# Patient Record
Sex: FEMALE | Race: WHITE
[De-identification: names, ages, dates, MRNs, and addresses within clinical notes are randomized per-mention and may not be internally consistent; named-entity substitution may affect disease eponyms.]

---

## 2017-09-04 ENCOUNTER — HOSPITAL ENCOUNTER (EMERGENCY)
Dept: HOSPITAL 8 - ED | Age: 22
Discharge: HOME | End: 2017-09-04
Payer: COMMERCIAL

## 2017-09-04 VITALS — SYSTOLIC BLOOD PRESSURE: 122 MMHG | DIASTOLIC BLOOD PRESSURE: 74 MMHG

## 2017-09-04 VITALS — HEIGHT: 63 IN | BODY MASS INDEX: 30.55 KG/M2 | WEIGHT: 172.4 LBS
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
## 2017-09-04 DIAGNOSIS — S43.102A: ICD-10-CM

## 2017-09-04 DIAGNOSIS — S43.52XA: Primary | ICD-10-CM
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
## 2017-09-04 PROCEDURE — 99284 EMERGENCY DEPT VISIT MOD MDM: CPT

## 2018-01-05 ENCOUNTER — HOSPITAL ENCOUNTER (EMERGENCY)
Dept: HOSPITAL 8 - ED | Age: 23
Discharge: HOME | End: 2018-01-05
Payer: COMMERCIAL

## 2018-01-05 VITALS — HEIGHT: 63 IN | WEIGHT: 179.17 LBS | BODY MASS INDEX: 31.75 KG/M2

## 2018-01-05 VITALS — DIASTOLIC BLOOD PRESSURE: 73 MMHG | SYSTOLIC BLOOD PRESSURE: 126 MMHG

## 2018-01-05 DIAGNOSIS — J02.9: Primary | ICD-10-CM

## 2018-01-05 DIAGNOSIS — R07.89: ICD-10-CM

## 2018-01-05 DIAGNOSIS — R05: ICD-10-CM

## 2018-01-05 PROCEDURE — 87400 INFLUENZA A/B EACH AG IA: CPT

## 2018-01-05 PROCEDURE — 93005 ELECTROCARDIOGRAM TRACING: CPT

## 2018-01-05 PROCEDURE — 71046 X-RAY EXAM CHEST 2 VIEWS: CPT

## 2018-01-05 PROCEDURE — 99285 EMERGENCY DEPT VISIT HI MDM: CPT

## 2018-03-08 ENCOUNTER — HOSPITAL ENCOUNTER (EMERGENCY)
Facility: MEDICAL CENTER | Age: 23
End: 2018-03-08
Attending: EMERGENCY MEDICINE
Payer: COMMERCIAL

## 2018-03-08 VITALS
HEART RATE: 74 BPM | HEIGHT: 63 IN | SYSTOLIC BLOOD PRESSURE: 112 MMHG | BODY MASS INDEX: 31.21 KG/M2 | DIASTOLIC BLOOD PRESSURE: 74 MMHG | TEMPERATURE: 98.1 F | RESPIRATION RATE: 16 BRPM | OXYGEN SATURATION: 98 % | WEIGHT: 176.15 LBS

## 2018-03-08 DIAGNOSIS — K12.1 STOMATITIS: ICD-10-CM

## 2018-03-08 LAB
S PYO AG THROAT QL: NORMAL
SIGNIFICANT IND 70042: NORMAL
SITE SITE: NORMAL
SOURCE SOURCE: NORMAL

## 2018-03-08 PROCEDURE — 87081 CULTURE SCREEN ONLY: CPT

## 2018-03-08 PROCEDURE — 700101 HCHG RX REV CODE 250: Performed by: EMERGENCY MEDICINE

## 2018-03-08 PROCEDURE — 99284 EMERGENCY DEPT VISIT MOD MDM: CPT

## 2018-03-08 PROCEDURE — 87880 STREP A ASSAY W/OPTIC: CPT

## 2018-03-08 RX ADMIN — LIDOCAINE HYDROCHLORIDE 15 ML: 20 SOLUTION ORAL; TOPICAL at 11:17

## 2018-03-08 ASSESSMENT — PAIN SCALES - GENERAL: PAINLEVEL_OUTOF10: 5

## 2018-03-08 ASSESSMENT — LIFESTYLE VARIABLES: DO YOU DRINK ALCOHOL: NO

## 2018-03-08 NOTE — DISCHARGE INSTRUCTIONS
Canker Sores  Introduction  Canker sores are small, painful sores that develop inside your mouth. They may also be called aphthous ulcers. You can get canker sores on the inside of your lips or cheeks, on your tongue, or anywhere inside your mouth. You can have just one canker sore or several of them. Canker sores cannot be passed from one person to another (noncontagious). These sores are different than the sores that you may get on the outside of your lips (cold sores or fever blisters).  Canker sores usually start as painful red bumps. Then they turn into small white, yellow, or gray ulcers that have red borders. The ulcers may be quite painful. The pain may be worse when you eat or drink.  What are the causes?  The cause of this condition is not known.  What increases the risk?  This condition is more likely to develop in:  · Women.  · People in their teens or 20s.  · Women who are having their menstrual period.  · People who are under a lot of emotional stress.  · People who do not get enough iron or B vitamins.  · People who have poor oral hygiene.  · People who have an injury inside the mouth. This can happen after having dental work or from chewing something hard.  What are the signs or symptoms?  Along with the canker sore, symptoms may also include:  · Fever.  · Fatigue.  · Swollen lymph nodes in your neck.  How is this diagnosed?  This condition can be diagnosed based on your symptoms. Your health care provider will also examine your mouth. Your health care provider may also do tests if you get canker sores often or if they are very bad. Tests may include:  · Blood tests to rule out other causes of canker sores.  · Taking swabs from the sore to check for infection.  · Taking a small piece of skin from the sore (biopsy) to test it for cancer.  How is this treated?  Most canker sores clear up without treatment in about 10 days. Home care is usually the only treatment that you will need. Over-the-counter  medicines can relieve discomfort. If you have severe canker sores, your health care provider may prescribe:  · Numbing ointment to relieve pain.  · Vitamins.  · Steroid medicines. These may be given as:  ¨ Oral pills.  ¨ Mouth rinses.  ¨ Gels.  · Antibiotic mouth rinse.  Follow these instructions at home:  · Apply, take, or use medicines only as directed by your health care provider. These include vitamins.  · If you were prescribed an antibiotic mouth rinse, finish all of it even if you start to feel better.  · Until the sores are healed:  ¨ Do not drink coffee or citrus juices.  ¨ Do not eat spicy or salty foods.  · Use a mild, over-the-counter mouth rinse as directed by your health care provider.  · Practice good oral hygiene.  ¨ Floss your teeth every day.  ¨ Brush your teeth with a soft brush twice each day.  Contact a health care provider if:  · Your symptoms do not get better after two weeks.  · You also have a fever or swollen glands.  · You get canker sores often.  · You have a canker sore that is getting larger.  · You cannot eat or drink due to your canker sores.  This information is not intended to replace advice given to you by your health care provider. Make sure you discuss any questions you have with your health care provider.  Document Released: 04/13/2012 Document Revised: 05/25/2017 Document Reviewed: 11/18/2015  © 2017 [employer]

## 2018-03-08 NOTE — ED PROVIDER NOTES
"ED Provider Note    Scribed for Rajani Thornton M.D. by Damian Manning. 3/8/2018  11:05 AM    Primary care provider: Pcp Pt States None  Means of arrival: Walk-in  History obtained from: Patient  History limited by: None    CHIEF COMPLAINT  Chief Complaint   Patient presents with   • Oral Swelling     pt reports blisters in mouth x 4 days. worse today.       HPI  Theodora Ponce is a 22 y.o. female who presents to the Emergency Department for oral swelling onset four days ago. Patient was seen at [facility] yesterday where she was diagnosed with a viral infection and prescribed Magic Mouthwash. This morning, she noticed her symptoms had greatly worsened. Patient is now unable to eat or speak properly, has a fever of 100 °F, and is experiencing a sore throat.   Patient denies any skin rash.    REVIEW OF SYSTEMS  HEENT:  sore throat, oral swelling    Endocrine: fevers  SKIN: no skin rash    See history of present illness. E.    PAST MEDICAL HISTORY   has a past medical history of Tonsillitis.    SURGICAL HISTORY   has a past surgical history that includes tonsillectomy (2/24/2010).    SOCIAL HISTORY  Social History   Substance Use Topics   • Smoking status: Never Smoker   • Alcohol use No      Comment: weekends      History   Drug Use No       FAMILY HISTORY  No pertinent family history.    CURRENT MEDICATIONS    Current Outpatient Prescriptions on File Prior to Encounter   Medication Sig Dispense Refill   • ondansetron (ZOFRAN ODT) 4 MG TABLET DISPERSIBLE Take 1 Tab by mouth every 8 hours as needed for Nausea/Vomiting. 10 Tab 0   • AMOXICILLIN by Does not apply route.     • Phenol-Phenolate Sodium (THROAT SPRAY MT) Spray  in mouth/throat.     • TYLENOL 8 HOUR PO Take  by mouth.        ALLERGIES  Allergies   Allergen Reactions   • Vicodin [Hydrocodone-Acetaminophen] Hives       PHYSICAL EXAM  VITAL SIGNS: /77   Pulse 80   Temp 36.7 °C (98 °F)   Resp 18   Ht 1.6 m (5' 3\")   Wt 79.9 kg (176 lb 2.4 oz)   " SpO2 93%   BMI 31.20 kg/m²     Constitutional: Well developed, Well nourished, No acute distress, Non-toxic appearance.   HEENT: Normocephalic, Atraumatic,  external ears normal, pharynx pink,  Mucous  Membranes moist, No rhinorrhea or mucosal edema. Cluster of ulcerations on right side of tongue. No submandibular or sublingual swelling. No drooling. No trismus. Posterior oropharynx is mildly erythematous. No exudates  Eyes: PERRL, EOMI, Conjunctiva normal, No discharge.   Neck: Normal range of motion, No tenderness, Supple, No stridor.   Lymphatic: No lymphadenopathy    Cardiovascular: Regular Rate and Rhythm, No murmurs,  rubs, or gallops.   Thorax & Lungs: Lungs clear to auscultation bilaterally, No respiratory distress, No wheezes, rhales or rhonchi, No chest wall tenderness.   Abdomen: Bowel sounds normal, Soft, non tender, non distended,  No pulsatile masses., no rebound guarding or peritoneal signs.   Skin: Warm, Dry, No erythema, No rash,   Extremities: Equal, intact distal pulses, No cyanosis, No tenderness.   Musculoskeletal: Good range of motion in all major joints. No tenderness to palpation or major deformities noted.   Neurologic: Alert & awake, no focal deficits  Psychiatric: Affect normal,       DIAGNOSTIC STUDIES / PROCEDURES    LABS  Results for orders placed or performed during the hospital encounter of 03/08/18   RAPID STREP, CULT IF INDICATED (CULTURE IF NEGATIVE)   Result Value Ref Range    Significant Indicator NEG     Source THRT     Site THROAT     Rapid Strep Screen       Negative for Group A streptococcus.  A negative result may be obtained if the specimen is  inadequate or antigen concentration is below the  sensitivity of the test. This negative test will be followed  up with a culture as requested.        All labs reviewed by me.    COURSE & MEDICAL DECISION MAKING  Nursing notes, VS, PMSFHx reviewed in chart.    11:05 AM - Patient seen and examined at bedside. Informed patient that  labs would look for strep although it is unlikely. Patient will be treated with topical Xylocaine. Ordered rapid strep to evaluate her symptoms. The differential diagnoses include but are not limited to: aphthous stomatitis viral vs. strep, pharyngitis.    11:47 AM Patient reports feeling improved at this time. Informed her of her lab results which were negative for strep throat. She is stable for discharge at this time with a prescription for topical lidocaine to treat her sores. Discussed return precautions and follow up if symptoms do not improve.     The patient will return for new or worsening symptoms and is stable at the time of discharge.    Patient has had high blood pressure while in the emergency department, felt likely secondary to medical condition. Counseled patient to monitor blood pressure at home and follow up with primary care physician.     DISPOSITION:  Patient will be discharged home in stable condition.    FOLLOW UP:  [address] [address]  [phone]  Call in 1 day  for recheck, to establish care      OUTPATIENT MEDICATIONS:  Discharge Medication List as of 3/8/2018 11:55 AM      START taking these medications    Details   lidocaine viscous 2% (XYLOCAINE) 2 % Solution Take 15 mL by mouth as needed for Throat/Mouth Pain., Disp-120 mL, R-1, Print Rx Paper              FINAL IMPRESSION  1. Stomatitis          Damian RAM (Scribe), am scribing for, and in the presence of, Rajani Thornton M.D..    Electronically signed by: Damian Manning (Moisés), 3/8/2018    Rajani RAM M.D. personally performed the services described in this documentation, as scribed by Damian Manning in my presence, and it is both accurate and complete.    The note accurately reflects work and decisions made by me.  Rajani Thornton  3/8/2018  3:41 PM

## 2018-03-08 NOTE — ED TRIAGE NOTES
"Chief Complaint   Patient presents with   • Oral Swelling     pt reports blisters in mouth x 4 days. worse today.     Was seen at [facility] for same a few days ago.  Blood pressure 123/77, pulse 81, temperature 36.7 °C (98 °F), resp. rate 18, height 1.6 m (5' 3\"), weight 79.9 kg (176 lb 2.4 oz), SpO2 97 %.    Pt informed of wait times. Educated on triage process.  Asked to return to triage RN for any new or worsening of symptoms. Thanked for patience.        "

## 2018-03-10 LAB
S PYO SPEC QL CULT: NORMAL
SIGNIFICANT IND 70042: NORMAL
SITE SITE: NORMAL
SOURCE SOURCE: NORMAL

## 2018-05-18 ENCOUNTER — HOSPITAL ENCOUNTER (INPATIENT)
Dept: HOSPITAL 8 - ED | Age: 23
LOS: 3 days | Discharge: HOME | DRG: 417 | End: 2018-05-21
Attending: INTERNAL MEDICINE | Admitting: HOSPITALIST
Payer: COMMERCIAL

## 2018-05-18 VITALS — HEIGHT: 63 IN | WEIGHT: 188.27 LBS | BODY MASS INDEX: 33.36 KG/M2

## 2018-05-18 VITALS — DIASTOLIC BLOOD PRESSURE: 68 MMHG | SYSTOLIC BLOOD PRESSURE: 99 MMHG

## 2018-05-18 DIAGNOSIS — E87.1: ICD-10-CM

## 2018-05-18 DIAGNOSIS — J90: ICD-10-CM

## 2018-05-18 DIAGNOSIS — D64.9: ICD-10-CM

## 2018-05-18 DIAGNOSIS — I30.9: ICD-10-CM

## 2018-05-18 DIAGNOSIS — E43: ICD-10-CM

## 2018-05-18 DIAGNOSIS — K81.0: Primary | ICD-10-CM

## 2018-05-18 LAB
ALBUMIN SERPL-MCNC: 3.3 G/DL (ref 3.4–5)
ALP SERPL-CCNC: 84 U/L (ref 45–117)
ALT SERPL-CCNC: 21 U/L (ref 12–78)
ANION GAP SERPL CALC-SCNC: 11 MMOL/L (ref 5–15)
BASOPHILS # BLD AUTO: 0.07 X10^3/UL (ref 0–0.1)
BASOPHILS NFR BLD AUTO: 0 % (ref 0–1)
BILIRUB SERPL-MCNC: 1.5 MG/DL (ref 0.2–1)
CALCIUM SERPL-MCNC: 8.7 MG/DL (ref 8.5–10.1)
CHLORIDE SERPL-SCNC: 99 MMOL/L (ref 98–107)
CREAT SERPL-MCNC: 0.91 MG/DL (ref 0.55–1.02)
EOSINOPHIL # BLD AUTO: 0.02 X10^3/UL (ref 0–0.4)
EOSINOPHIL NFR BLD AUTO: 0 % (ref 1–7)
ERYTHROCYTE [DISTWIDTH] IN BLOOD BY AUTOMATED COUNT: 14.5 % (ref 9.6–15.2)
ERYTHROCYTE [SEDIMENTATION RATE] IN BLOOD BY WESTERGREN METHOD: 69 MM/HR (ref 0–20)
HCT (SEDRATE): 36.8 % (ref 34.6–47.8)
LYMPHOCYTES # BLD AUTO: 2.46 X10^3/UL (ref 1–3.4)
LYMPHOCYTES NFR BLD AUTO: 14 % (ref 22–44)
MCH RBC QN AUTO: 28.9 PG (ref 27–34.8)
MCHC RBC AUTO-ENTMCNC: 33.4 G/DL (ref 32.4–35.8)
MCV RBC AUTO: 86.4 FL (ref 80–100)
MD: (no result)
MONOCYTES # BLD AUTO: 1.67 X10^3/UL (ref 0.2–0.8)
MONOCYTES NFR BLD AUTO: 10 % (ref 2–9)
NEUTROPHILS # BLD AUTO: 12.97 X10^3/UL (ref 1.8–6.8)
NEUTROPHILS NFR BLD AUTO: 75 % (ref 42–75)
PLATELET # BLD AUTO: 405 X10^3/UL (ref 130–400)
PMV BLD AUTO: 9.9 FL (ref 7.4–10.4)
PROT SERPL-MCNC: 7.7 G/DL (ref 6.4–8.2)
RBC # BLD AUTO: 4.26 X10^6/UL (ref 3.82–5.3)

## 2018-05-18 PROCEDURE — 84703 CHORIONIC GONADOTROPIN ASSAY: CPT

## 2018-05-18 PROCEDURE — 85025 COMPLETE CBC W/AUTO DIFF WBC: CPT

## 2018-05-18 PROCEDURE — 93321 DOPPLER ECHO F-UP/LMTD STD: CPT

## 2018-05-18 PROCEDURE — 36415 COLL VENOUS BLD VENIPUNCTURE: CPT

## 2018-05-18 PROCEDURE — 88304 TISSUE EXAM BY PATHOLOGIST: CPT

## 2018-05-18 PROCEDURE — 84100 ASSAY OF PHOSPHORUS: CPT

## 2018-05-18 PROCEDURE — 76700 US EXAM ABDOM COMPLETE: CPT

## 2018-05-18 PROCEDURE — 83690 ASSAY OF LIPASE: CPT

## 2018-05-18 PROCEDURE — 85651 RBC SED RATE NONAUTOMATED: CPT

## 2018-05-18 PROCEDURE — 96375 TX/PRO/DX INJ NEW DRUG ADDON: CPT

## 2018-05-18 PROCEDURE — 80048 BASIC METABOLIC PNL TOTAL CA: CPT

## 2018-05-18 PROCEDURE — 80053 COMPREHEN METABOLIC PANEL: CPT

## 2018-05-18 PROCEDURE — 74022 RADEX COMPL AQT ABD SERIES: CPT

## 2018-05-18 PROCEDURE — 83735 ASSAY OF MAGNESIUM: CPT

## 2018-05-18 PROCEDURE — 0FT44ZZ RESECTION OF GALLBLADDER, PERCUTANEOUS ENDOSCOPIC APPROACH: ICD-10-PCS | Performed by: SURGERY

## 2018-05-18 PROCEDURE — S0028 INJECTION, FAMOTIDINE, 20 MG: HCPCS

## 2018-05-18 PROCEDURE — 86038 ANTINUCLEAR ANTIBODIES: CPT

## 2018-05-18 PROCEDURE — 93005 ELECTROCARDIOGRAM TRACING: CPT

## 2018-05-18 PROCEDURE — 93325 DOPPLER ECHO COLOR FLOW MAPG: CPT

## 2018-05-18 PROCEDURE — 96365 THER/PROPH/DIAG IV INF INIT: CPT

## 2018-05-18 PROCEDURE — S0074 INJECTION, CEFOTETAN DISODIU: HCPCS

## 2018-05-18 PROCEDURE — 93308 TTE F-UP OR LMTD: CPT

## 2018-05-18 RX ADMIN — MORPHINE SULFATE PRN MG: 4 INJECTION INTRAVENOUS at 13:25

## 2018-05-18 RX ADMIN — FENTANYL CITRATE PRN MCG: 50 INJECTION INTRAMUSCULAR; INTRAVENOUS at 18:01

## 2018-05-18 RX ADMIN — HEPARIN SODIUM SCH UNITS: 5000 INJECTION, SOLUTION INTRAVENOUS; SUBCUTANEOUS at 19:55

## 2018-05-18 RX ADMIN — MORPHINE SULFATE PRN MG: 4 INJECTION INTRAVENOUS at 18:19

## 2018-05-18 RX ADMIN — FENTANYL CITRATE PRN MCG: 50 INJECTION INTRAMUSCULAR; INTRAVENOUS at 18:28

## 2018-05-18 RX ADMIN — MORPHINE SULFATE PRN MG: 4 INJECTION INTRAVENOUS at 18:06

## 2018-05-18 RX ADMIN — OXYCODONE HYDROCHLORIDE AND ACETAMINOPHEN PRN TAB: 5; 325 TABLET ORAL at 19:55

## 2018-05-18 RX ADMIN — FENTANYL CITRATE PRN MCG: 50 INJECTION INTRAMUSCULAR; INTRAVENOUS at 18:13

## 2018-05-18 RX ADMIN — MORPHINE SULFATE PRN MG: 4 INJECTION INTRAVENOUS at 13:38

## 2018-05-19 VITALS — SYSTOLIC BLOOD PRESSURE: 91 MMHG | DIASTOLIC BLOOD PRESSURE: 47 MMHG

## 2018-05-19 VITALS — DIASTOLIC BLOOD PRESSURE: 70 MMHG | SYSTOLIC BLOOD PRESSURE: 115 MMHG

## 2018-05-19 VITALS — SYSTOLIC BLOOD PRESSURE: 98 MMHG | DIASTOLIC BLOOD PRESSURE: 65 MMHG

## 2018-05-19 VITALS — DIASTOLIC BLOOD PRESSURE: 69 MMHG | SYSTOLIC BLOOD PRESSURE: 102 MMHG

## 2018-05-19 VITALS — SYSTOLIC BLOOD PRESSURE: 113 MMHG | DIASTOLIC BLOOD PRESSURE: 72 MMHG

## 2018-05-19 LAB
ALBUMIN SERPL-MCNC: 2.4 G/DL (ref 3.4–5)
ALP SERPL-CCNC: 68 U/L (ref 45–117)
ALT SERPL-CCNC: 24 U/L (ref 12–78)
ANION GAP SERPL CALC-SCNC: 7 MMOL/L (ref 5–15)
BASOPHILS # BLD AUTO: 0.13 X10^3/UL (ref 0–0.1)
BASOPHILS NFR BLD AUTO: 1 % (ref 0–1)
BILIRUB SERPL-MCNC: 1.4 MG/DL (ref 0.2–1)
CALCIUM SERPL-MCNC: 8 MG/DL (ref 8.5–10.1)
CHLORIDE SERPL-SCNC: 103 MMOL/L (ref 98–107)
CREAT SERPL-MCNC: 0.65 MG/DL (ref 0.55–1.02)
EOSINOPHIL # BLD AUTO: 0 X10^3/UL (ref 0–0.4)
EOSINOPHIL NFR BLD AUTO: 0 % (ref 1–7)
ERYTHROCYTE [DISTWIDTH] IN BLOOD BY AUTOMATED COUNT: 14.9 % (ref 9.6–15.2)
LYMPHOCYTES # BLD AUTO: 1.05 X10^3/UL (ref 1–3.4)
LYMPHOCYTES NFR BLD AUTO: 7 % (ref 22–44)
MCH RBC QN AUTO: 29.2 PG (ref 27–34.8)
MCHC RBC AUTO-ENTMCNC: 33.2 G/DL (ref 32.4–35.8)
MCV RBC AUTO: 87.9 FL (ref 80–100)
MD: NO
MONOCYTES # BLD AUTO: 1.06 X10^3/UL (ref 0.2–0.8)
MONOCYTES NFR BLD AUTO: 7 % (ref 2–9)
NEUTROPHILS # BLD AUTO: 12.17 X10^3/UL (ref 1.8–6.8)
NEUTROPHILS NFR BLD AUTO: 84 % (ref 42–75)
PLATELET # BLD AUTO: 283 X10^3/UL (ref 130–400)
PMV BLD AUTO: 10.3 FL (ref 7.4–10.4)
PROT SERPL-MCNC: 5.9 G/DL (ref 6.4–8.2)
RBC # BLD AUTO: 3.56 X10^6/UL (ref 3.82–5.3)

## 2018-05-19 RX ADMIN — HEPARIN SODIUM SCH UNITS: 5000 INJECTION, SOLUTION INTRAVENOUS; SUBCUTANEOUS at 20:12

## 2018-05-19 RX ADMIN — OXYCODONE HYDROCHLORIDE AND ACETAMINOPHEN PRN TAB: 5; 325 TABLET ORAL at 13:44

## 2018-05-19 RX ADMIN — OXYCODONE HYDROCHLORIDE AND ACETAMINOPHEN PRN TAB: 5; 325 TABLET ORAL at 05:18

## 2018-05-19 RX ADMIN — HEPARIN SODIUM SCH UNITS: 5000 INJECTION, SOLUTION INTRAVENOUS; SUBCUTANEOUS at 05:17

## 2018-05-19 RX ADMIN — IBUPROFEN SCH MG: 200 TABLET, FILM COATED ORAL at 15:39

## 2018-05-19 RX ADMIN — IBUPROFEN SCH MG: 200 TABLET, FILM COATED ORAL at 20:16

## 2018-05-19 RX ADMIN — OXYCODONE HYDROCHLORIDE AND ACETAMINOPHEN PRN TAB: 5; 325 TABLET ORAL at 10:07

## 2018-05-19 RX ADMIN — OXYCODONE HYDROCHLORIDE AND ACETAMINOPHEN PRN TAB: 5; 325 TABLET ORAL at 00:17

## 2018-05-19 RX ADMIN — HEPARIN SODIUM SCH UNITS: 5000 INJECTION, SOLUTION INTRAVENOUS; SUBCUTANEOUS at 13:44

## 2018-05-20 VITALS — DIASTOLIC BLOOD PRESSURE: 61 MMHG | SYSTOLIC BLOOD PRESSURE: 95 MMHG

## 2018-05-20 VITALS — SYSTOLIC BLOOD PRESSURE: 100 MMHG | DIASTOLIC BLOOD PRESSURE: 56 MMHG

## 2018-05-20 VITALS — DIASTOLIC BLOOD PRESSURE: 71 MMHG | SYSTOLIC BLOOD PRESSURE: 108 MMHG

## 2018-05-20 VITALS — DIASTOLIC BLOOD PRESSURE: 70 MMHG | SYSTOLIC BLOOD PRESSURE: 105 MMHG

## 2018-05-20 LAB
ANION GAP SERPL CALC-SCNC: 6 MMOL/L (ref 5–15)
BASOPHILS # BLD AUTO: 0.04 X10^3/UL (ref 0–0.1)
BASOPHILS NFR BLD AUTO: 1 % (ref 0–1)
CALCIUM SERPL-MCNC: 7.6 MG/DL (ref 8.5–10.1)
CHLORIDE SERPL-SCNC: 107 MMOL/L (ref 98–107)
CREAT SERPL-MCNC: 0.55 MG/DL (ref 0.55–1.02)
EOSINOPHIL # BLD AUTO: 0.02 X10^3/UL (ref 0–0.4)
EOSINOPHIL NFR BLD AUTO: 0 % (ref 1–7)
ERYTHROCYTE [DISTWIDTH] IN BLOOD BY AUTOMATED COUNT: 14.8 % (ref 9.6–15.2)
LYMPHOCYTES # BLD AUTO: 1.61 X10^3/UL (ref 1–3.4)
LYMPHOCYTES NFR BLD AUTO: 23 % (ref 22–44)
MCH RBC QN AUTO: 29.2 PG (ref 27–34.8)
MCHC RBC AUTO-ENTMCNC: 33.6 G/DL (ref 32.4–35.8)
MCV RBC AUTO: 86.9 FL (ref 80–100)
MD: NO
MONOCYTES # BLD AUTO: 0.72 X10^3/UL (ref 0.2–0.8)
MONOCYTES NFR BLD AUTO: 10 % (ref 2–9)
NEUTROPHILS # BLD AUTO: 4.6 X10^3/UL (ref 1.8–6.8)
NEUTROPHILS NFR BLD AUTO: 66 % (ref 42–75)
PLATELET # BLD AUTO: 303 X10^3/UL (ref 130–400)
PMV BLD AUTO: 9.1 FL (ref 7.4–10.4)
RBC # BLD AUTO: 3.28 X10^6/UL (ref 3.82–5.3)

## 2018-05-20 RX ADMIN — HEPARIN SODIUM SCH UNITS: 5000 INJECTION, SOLUTION INTRAVENOUS; SUBCUTANEOUS at 20:57

## 2018-05-20 RX ADMIN — IBUPROFEN SCH MG: 200 TABLET, FILM COATED ORAL at 20:57

## 2018-05-20 RX ADMIN — HEPARIN SODIUM SCH UNITS: 5000 INJECTION, SOLUTION INTRAVENOUS; SUBCUTANEOUS at 05:46

## 2018-05-20 RX ADMIN — IBUPROFEN SCH MG: 200 TABLET, FILM COATED ORAL at 08:14

## 2018-05-20 RX ADMIN — IBUPROFEN SCH MG: 200 TABLET, FILM COATED ORAL at 02:23

## 2018-05-20 RX ADMIN — OXYCODONE HYDROCHLORIDE AND ACETAMINOPHEN PRN TAB: 5; 325 TABLET ORAL at 02:23

## 2018-05-20 RX ADMIN — OXYCODONE HYDROCHLORIDE AND ACETAMINOPHEN PRN TAB: 5; 325 TABLET ORAL at 18:18

## 2018-05-20 RX ADMIN — IBUPROFEN SCH MG: 200 TABLET, FILM COATED ORAL at 15:31

## 2018-05-20 RX ADMIN — HEPARIN SODIUM SCH UNITS: 5000 INJECTION, SOLUTION INTRAVENOUS; SUBCUTANEOUS at 13:11

## 2018-05-21 VITALS — SYSTOLIC BLOOD PRESSURE: 113 MMHG | DIASTOLIC BLOOD PRESSURE: 74 MMHG

## 2018-05-21 VITALS — SYSTOLIC BLOOD PRESSURE: 111 MMHG | DIASTOLIC BLOOD PRESSURE: 76 MMHG

## 2018-05-21 VITALS — DIASTOLIC BLOOD PRESSURE: 68 MMHG | SYSTOLIC BLOOD PRESSURE: 97 MMHG

## 2018-05-21 RX ADMIN — IBUPROFEN SCH MG: 200 TABLET, FILM COATED ORAL at 08:27

## 2018-05-21 RX ADMIN — IBUPROFEN SCH MG: 200 TABLET, FILM COATED ORAL at 15:15

## 2018-05-21 RX ADMIN — HEPARIN SODIUM SCH UNITS: 5000 INJECTION, SOLUTION INTRAVENOUS; SUBCUTANEOUS at 04:38

## 2018-05-21 RX ADMIN — OXYCODONE HYDROCHLORIDE AND ACETAMINOPHEN PRN TAB: 5; 325 TABLET ORAL at 03:18

## 2018-05-21 RX ADMIN — IBUPROFEN SCH MG: 200 TABLET, FILM COATED ORAL at 03:18

## 2018-05-21 RX ADMIN — HEPARIN SODIUM SCH UNITS: 5000 INJECTION, SOLUTION INTRAVENOUS; SUBCUTANEOUS at 12:04

## 2018-05-22 LAB — ANA SER QL: NEGATIVE

## 2018-05-23 ENCOUNTER — HOSPITAL ENCOUNTER (INPATIENT)
Dept: HOSPITAL 8 - ED | Age: 23
LOS: 2 days | Discharge: HOME | DRG: 314 | End: 2018-05-25
Attending: HOSPITALIST | Admitting: HOSPITALIST
Payer: COMMERCIAL

## 2018-05-23 ENCOUNTER — HOSPITAL ENCOUNTER (OUTPATIENT)
Dept: HOSPITAL 8 - CFH | Age: 23
End: 2018-05-23
Attending: INTERNAL MEDICINE
Payer: COMMERCIAL

## 2018-05-23 VITALS — HEIGHT: 63 IN | BODY MASS INDEX: 30.7 KG/M2 | WEIGHT: 173.28 LBS

## 2018-05-23 VITALS — SYSTOLIC BLOOD PRESSURE: 136 MMHG | DIASTOLIC BLOOD PRESSURE: 85 MMHG

## 2018-05-23 VITALS — SYSTOLIC BLOOD PRESSURE: 104 MMHG | DIASTOLIC BLOOD PRESSURE: 68 MMHG

## 2018-05-23 DIAGNOSIS — Z88.8: ICD-10-CM

## 2018-05-23 DIAGNOSIS — I31.3: Primary | ICD-10-CM

## 2018-05-23 DIAGNOSIS — J91.8: ICD-10-CM

## 2018-05-23 DIAGNOSIS — Z87.891: ICD-10-CM

## 2018-05-23 DIAGNOSIS — J96.01: ICD-10-CM

## 2018-05-23 DIAGNOSIS — Z90.49: ICD-10-CM

## 2018-05-23 DIAGNOSIS — I31.9: ICD-10-CM

## 2018-05-23 DIAGNOSIS — E87.6: ICD-10-CM

## 2018-05-23 DIAGNOSIS — J90: Primary | ICD-10-CM

## 2018-05-23 LAB
ALBUMIN SERPL-MCNC: 2.6 G/DL (ref 3.4–5)
ALP SERPL-CCNC: 98 U/L (ref 45–117)
ALT SERPL-CCNC: 27 U/L (ref 12–78)
ANION GAP SERPL CALC-SCNC: 10 MMOL/L (ref 5–15)
APTT BLD: 27 SECONDS (ref 25–31)
BASOPHILS # BLD AUTO: 0.06 X10^3/UL (ref 0–0.1)
BASOPHILS NFR BLD AUTO: 1 % (ref 0–1)
BILIRUB SERPL-MCNC: 1.1 MG/DL (ref 0.2–1)
CALCIUM SERPL-MCNC: 8.1 MG/DL (ref 8.5–10.1)
CHLORIDE SERPL-SCNC: 107 MMOL/L (ref 98–107)
CREAT SERPL-MCNC: 0.55 MG/DL (ref 0.55–1.02)
EOSINOPHIL # BLD AUTO: 0.07 X10^3/UL (ref 0–0.4)
EOSINOPHIL NFR BLD AUTO: 1 % (ref 1–7)
ERYTHROCYTE [DISTWIDTH] IN BLOOD BY AUTOMATED COUNT: 15 % (ref 9.6–15.2)
ERYTHROCYTE [SEDIMENTATION RATE] IN BLOOD BY WESTERGREN METHOD: 99 MM/HR (ref 0–20)
HCT (SEDRATE): 34.1 % (ref 34.6–47.8)
INR PPP: 1.08 (ref 0.93–1.1)
L PNEUMO AB SER IA-ACNC: > 19 MG/DL (ref 0.02–0.3)
LYMPHOCYTES # BLD AUTO: 1.46 X10^3/UL (ref 1–3.4)
LYMPHOCYTES NFR BLD AUTO: 16 % (ref 22–44)
MCH RBC QN AUTO: 29 PG (ref 27–34.8)
MCHC RBC AUTO-ENTMCNC: 33.8 G/DL (ref 32.4–35.8)
MCV RBC AUTO: 85.9 FL (ref 80–100)
MD: NO
MONOCYTES # BLD AUTO: 0.99 X10^3/UL (ref 0.2–0.8)
MONOCYTES NFR BLD AUTO: 11 % (ref 2–9)
NEUTROPHILS # BLD AUTO: 6.82 X10^3/UL (ref 1.8–6.8)
NEUTROPHILS NFR BLD AUTO: 73 % (ref 42–75)
PLATELET # BLD AUTO: 513 X10^3/UL (ref 130–400)
PMV BLD AUTO: 8.3 FL (ref 7.4–10.4)
PROT SERPL-MCNC: 7 G/DL (ref 6.4–8.2)
PROTHROMBIN TIME: 11.2 SECONDS (ref 9.6–11.5)
RBC # BLD AUTO: 3.97 X10^6/UL (ref 3.82–5.3)
TROPONIN I SERPL-MCNC: < 0.015 NG/ML (ref 0–0.04)

## 2018-05-23 PROCEDURE — 87102 FUNGUS ISOLATION CULTURE: CPT

## 2018-05-23 PROCEDURE — 80048 BASIC METABOLIC PNL TOTAL CA: CPT

## 2018-05-23 PROCEDURE — 84484 ASSAY OF TROPONIN QUANT: CPT

## 2018-05-23 PROCEDURE — 87116 MYCOBACTERIA CULTURE: CPT

## 2018-05-23 PROCEDURE — 83735 ASSAY OF MAGNESIUM: CPT

## 2018-05-23 PROCEDURE — 84157 ASSAY OF PROTEIN OTHER: CPT

## 2018-05-23 PROCEDURE — 85025 COMPLETE CBC W/AUTO DIFF WBC: CPT

## 2018-05-23 PROCEDURE — 32555 ASPIRATE PLEURA W/ IMAGING: CPT

## 2018-05-23 PROCEDURE — 83986 ASSAY PH BODY FLUID NOS: CPT

## 2018-05-23 PROCEDURE — 87070 CULTURE OTHR SPECIMN AEROBIC: CPT

## 2018-05-23 PROCEDURE — 93308 TTE F-UP OR LMTD: CPT

## 2018-05-23 PROCEDURE — 87493 C DIFF AMPLIFIED PROBE: CPT

## 2018-05-23 PROCEDURE — 93005 ELECTROCARDIOGRAM TRACING: CPT

## 2018-05-23 PROCEDURE — 85610 PROTHROMBIN TIME: CPT

## 2018-05-23 PROCEDURE — 93325 DOPPLER ECHO COLOR FLOW MAPG: CPT

## 2018-05-23 PROCEDURE — 96376 TX/PRO/DX INJ SAME DRUG ADON: CPT

## 2018-05-23 PROCEDURE — 87075 CULTR BACTERIA EXCEPT BLOOD: CPT

## 2018-05-23 PROCEDURE — 87324 CLOSTRIDIUM AG IA: CPT

## 2018-05-23 PROCEDURE — 87205 SMEAR GRAM STAIN: CPT

## 2018-05-23 PROCEDURE — 96374 THER/PROPH/DIAG INJ IV PUSH: CPT

## 2018-05-23 PROCEDURE — 83880 ASSAY OF NATRIURETIC PEPTIDE: CPT

## 2018-05-23 PROCEDURE — 87206 SMEAR FLUORESCENT/ACID STAI: CPT

## 2018-05-23 PROCEDURE — 85730 THROMBOPLASTIN TIME PARTIAL: CPT

## 2018-05-23 PROCEDURE — 71046 X-RAY EXAM CHEST 2 VIEWS: CPT

## 2018-05-23 PROCEDURE — 89051 BODY FLUID CELL COUNT: CPT

## 2018-05-23 PROCEDURE — 86038 ANTINUCLEAR ANTIBODIES: CPT

## 2018-05-23 PROCEDURE — 0W9B3ZZ DRAINAGE OF LEFT PLEURAL CAVITY, PERCUTANEOUS APPROACH: ICD-10-PCS | Performed by: RADIOLOGY

## 2018-05-23 PROCEDURE — 83615 LACTATE (LD) (LDH) ENZYME: CPT

## 2018-05-23 PROCEDURE — 82945 GLUCOSE OTHER FLUID: CPT

## 2018-05-23 PROCEDURE — 82150 ASSAY OF AMYLASE: CPT

## 2018-05-23 PROCEDURE — 0W993ZZ DRAINAGE OF RIGHT PLEURAL CAVITY, PERCUTANEOUS APPROACH: ICD-10-PCS | Performed by: RADIOLOGY

## 2018-05-23 PROCEDURE — 71045 X-RAY EXAM CHEST 1 VIEW: CPT

## 2018-05-23 PROCEDURE — 80053 COMPREHEN METABOLIC PANEL: CPT

## 2018-05-23 PROCEDURE — 96375 TX/PRO/DX INJ NEW DRUG ADDON: CPT

## 2018-05-23 PROCEDURE — 85651 RBC SED RATE NONAUTOMATED: CPT

## 2018-05-23 PROCEDURE — 84703 CHORIONIC GONADOTROPIN ASSAY: CPT

## 2018-05-23 PROCEDURE — 93321 DOPPLER ECHO F-UP/LMTD STD: CPT

## 2018-05-23 PROCEDURE — 86141 C-REACTIVE PROTEIN HS: CPT

## 2018-05-23 PROCEDURE — 36415 COLL VENOUS BLD VENIPUNCTURE: CPT

## 2018-05-23 RX ADMIN — SODIUM CHLORIDE SCH MLS/HR: 0.9 INJECTION, SOLUTION INTRAVENOUS at 15:54

## 2018-05-23 RX ADMIN — COLCHICINE SCH MG: 0.6 TABLET, FILM COATED ORAL at 19:57

## 2018-05-23 RX ADMIN — ACETAMINOPHEN SCH MG: 325 TABLET, FILM COATED ORAL at 19:57

## 2018-05-23 RX ADMIN — MORPHINE SULFATE PRN MG: 4 INJECTION INTRAVENOUS at 15:59

## 2018-05-23 RX ADMIN — MORPHINE SULFATE PRN MG: 4 INJECTION INTRAVENOUS at 21:01

## 2018-05-23 RX ADMIN — INDOMETHACIN SCH MG: 25 CAPSULE ORAL at 19:57

## 2018-05-23 RX ADMIN — INDOMETHACIN SCH MG: 25 CAPSULE ORAL at 15:53

## 2018-05-23 RX ADMIN — ACETAMINOPHEN SCH MG: 325 TABLET, FILM COATED ORAL at 15:54

## 2018-05-24 VITALS — DIASTOLIC BLOOD PRESSURE: 58 MMHG | SYSTOLIC BLOOD PRESSURE: 97 MMHG

## 2018-05-24 VITALS — SYSTOLIC BLOOD PRESSURE: 102 MMHG | DIASTOLIC BLOOD PRESSURE: 68 MMHG

## 2018-05-24 VITALS — SYSTOLIC BLOOD PRESSURE: 107 MMHG | DIASTOLIC BLOOD PRESSURE: 71 MMHG

## 2018-05-24 VITALS — SYSTOLIC BLOOD PRESSURE: 102 MMHG | DIASTOLIC BLOOD PRESSURE: 61 MMHG

## 2018-05-24 VITALS — DIASTOLIC BLOOD PRESSURE: 71 MMHG | SYSTOLIC BLOOD PRESSURE: 107 MMHG

## 2018-05-24 LAB
ANA SER QL: NEGATIVE
ANION GAP SERPL CALC-SCNC: 9 MMOL/L (ref 5–15)
CALCIUM SERPL-MCNC: 8.3 MG/DL (ref 8.5–10.1)
CHLORIDE SERPL-SCNC: 104 MMOL/L (ref 98–107)
CREAT SERPL-MCNC: 0.39 MG/DL (ref 0.55–1.02)

## 2018-05-24 RX ADMIN — SODIUM CHLORIDE SCH MLS/HR: 0.9 INJECTION, SOLUTION INTRAVENOUS at 09:20

## 2018-05-24 RX ADMIN — SODIUM CHLORIDE SCH MLS/HR: 0.9 INJECTION, SOLUTION INTRAVENOUS at 23:50

## 2018-05-24 RX ADMIN — ACETAMINOPHEN SCH MG: 325 TABLET, FILM COATED ORAL at 14:38

## 2018-05-24 RX ADMIN — INDOMETHACIN SCH MG: 25 CAPSULE ORAL at 08:19

## 2018-05-24 RX ADMIN — ACETAMINOPHEN SCH MG: 325 TABLET, FILM COATED ORAL at 20:27

## 2018-05-24 RX ADMIN — MORPHINE SULFATE PRN MG: 4 INJECTION INTRAVENOUS at 08:18

## 2018-05-24 RX ADMIN — COLCHICINE SCH MG: 0.6 TABLET, FILM COATED ORAL at 20:26

## 2018-05-24 RX ADMIN — MORPHINE SULFATE PRN MG: 4 INJECTION INTRAVENOUS at 04:12

## 2018-05-24 RX ADMIN — ACETAMINOPHEN SCH MG: 325 TABLET, FILM COATED ORAL at 08:19

## 2018-05-24 RX ADMIN — ACETAMINOPHEN SCH MG: 325 TABLET, FILM COATED ORAL at 02:30

## 2018-05-24 RX ADMIN — COLCHICINE SCH MG: 0.6 TABLET, FILM COATED ORAL at 08:19

## 2018-05-25 VITALS — SYSTOLIC BLOOD PRESSURE: 101 MMHG | DIASTOLIC BLOOD PRESSURE: 64 MMHG

## 2018-05-25 VITALS — SYSTOLIC BLOOD PRESSURE: 108 MMHG | DIASTOLIC BLOOD PRESSURE: 72 MMHG

## 2018-05-25 VITALS — DIASTOLIC BLOOD PRESSURE: 68 MMHG | SYSTOLIC BLOOD PRESSURE: 102 MMHG

## 2018-05-25 LAB
CLOSTRIDIUM DIFFICILE ANTIGEN: POSITIVE
CLOSTRIDIUM DIFFICILE TOXIN: NEGATIVE

## 2018-05-25 RX ADMIN — COLCHICINE SCH MG: 0.6 TABLET, FILM COATED ORAL at 08:21

## 2018-05-25 RX ADMIN — ACETAMINOPHEN SCH MG: 325 TABLET, FILM COATED ORAL at 02:30

## 2018-05-25 RX ADMIN — ACETAMINOPHEN SCH MG: 325 TABLET, FILM COATED ORAL at 08:21

## 2018-06-05 ENCOUNTER — HOSPITAL ENCOUNTER (OUTPATIENT)
Dept: HOSPITAL 8 - CFH | Age: 23
Discharge: HOME | End: 2018-06-05
Attending: INTERNAL MEDICINE
Payer: COMMERCIAL

## 2018-06-05 DIAGNOSIS — I31.9: Primary | ICD-10-CM

## 2018-06-05 PROCEDURE — 71046 X-RAY EXAM CHEST 2 VIEWS: CPT

## 2018-06-07 ENCOUNTER — HOSPITAL ENCOUNTER (INPATIENT)
Dept: HOSPITAL 8 - ED | Age: 23
LOS: 1 days | Discharge: HOME | DRG: 315 | End: 2018-06-08
Attending: HOSPITALIST | Admitting: HOSPITALIST
Payer: COMMERCIAL

## 2018-06-07 VITALS — DIASTOLIC BLOOD PRESSURE: 67 MMHG | SYSTOLIC BLOOD PRESSURE: 104 MMHG

## 2018-06-07 VITALS — HEIGHT: 63 IN | WEIGHT: 173.94 LBS | BODY MASS INDEX: 30.82 KG/M2

## 2018-06-07 VITALS — SYSTOLIC BLOOD PRESSURE: 92 MMHG | DIASTOLIC BLOOD PRESSURE: 60 MMHG

## 2018-06-07 DIAGNOSIS — I30.0: Primary | ICD-10-CM

## 2018-06-07 DIAGNOSIS — I31.3: ICD-10-CM

## 2018-06-07 DIAGNOSIS — Z90.49: ICD-10-CM

## 2018-06-07 DIAGNOSIS — E87.1: ICD-10-CM

## 2018-06-07 DIAGNOSIS — E86.0: ICD-10-CM

## 2018-06-07 DIAGNOSIS — Z87.891: ICD-10-CM

## 2018-06-07 DIAGNOSIS — T81.4XXA: ICD-10-CM

## 2018-06-07 DIAGNOSIS — Z83.6: ICD-10-CM

## 2018-06-07 DIAGNOSIS — Z82.49: ICD-10-CM

## 2018-06-07 DIAGNOSIS — R00.0: ICD-10-CM

## 2018-06-07 DIAGNOSIS — J90: ICD-10-CM
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
## 2018-06-07 DIAGNOSIS — Z82.0: ICD-10-CM
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
## 2018-06-07 DIAGNOSIS — D72.829: ICD-10-CM

## 2018-06-07 LAB
ALBUMIN SERPL-MCNC: 3.6 G/DL (ref 3.4–5)
ALP SERPL-CCNC: 97 U/L (ref 45–117)
ALT SERPL-CCNC: 29 U/L (ref 12–78)
ANION GAP SERPL CALC-SCNC: 8 MMOL/L (ref 5–15)
BASOPHILS # BLD AUTO: 0.11 X10^3/UL (ref 0–0.1)
BASOPHILS NFR BLD AUTO: 1 % (ref 0–1)
BILIRUB SERPL-MCNC: 1.7 MG/DL (ref 0.2–1)
CALCIUM SERPL-MCNC: 9.1 MG/DL (ref 8.5–10.1)
CHLORIDE SERPL-SCNC: 102 MMOL/L (ref 98–107)
CREAT SERPL-MCNC: 0.68 MG/DL (ref 0.55–1.02)
EOSINOPHIL # BLD AUTO: 0.06 X10^3/UL (ref 0–0.4)
EOSINOPHIL NFR BLD AUTO: 1 % (ref 1–7)
ERYTHROCYTE [DISTWIDTH] IN BLOOD BY AUTOMATED COUNT: 14.3 % (ref 9.6–15.2)
ERYTHROCYTE [SEDIMENTATION RATE] IN BLOOD BY WESTERGREN METHOD: 58 MM/HR (ref 0–20)
HCT (SEDRATE): 38.2 % (ref 34.6–47.8)
LYMPHOCYTES # BLD AUTO: 2.3 X10^3/UL (ref 1–3.4)
LYMPHOCYTES NFR BLD AUTO: 20 % (ref 22–44)
MCH RBC QN AUTO: 27.9 PG (ref 27–34.8)
MCHC RBC AUTO-ENTMCNC: 33.4 G/DL (ref 32.4–35.8)
MCV RBC AUTO: 83.7 FL (ref 80–100)
MD: NO
MONOCYTES # BLD AUTO: 1.18 X10^3/UL (ref 0.2–0.8)
MONOCYTES NFR BLD AUTO: 10 % (ref 2–9)
NEUTROPHILS # BLD AUTO: 7.86 X10^3/UL (ref 1.8–6.8)
NEUTROPHILS NFR BLD AUTO: 68 % (ref 42–75)
PLATELET # BLD AUTO: 412 X10^3/UL (ref 130–400)
PMV BLD AUTO: 9.4 FL (ref 7.4–10.4)
PROT SERPL-MCNC: 7.9 G/DL (ref 6.4–8.2)
RBC # BLD AUTO: 4.57 X10^6/UL (ref 3.82–5.3)
TROPONIN I SERPL-MCNC: < 0.015 NG/ML (ref 0–0.04)
TROPONIN I SERPL-MCNC: < 0.015 NG/ML (ref 0–0.04)

## 2018-06-07 PROCEDURE — 96374 THER/PROPH/DIAG INJ IV PUSH: CPT

## 2018-06-07 PROCEDURE — 93306 TTE W/DOPPLER COMPLETE: CPT

## 2018-06-07 PROCEDURE — 84484 ASSAY OF TROPONIN QUANT: CPT

## 2018-06-07 PROCEDURE — 85025 COMPLETE CBC W/AUTO DIFF WBC: CPT

## 2018-06-07 PROCEDURE — 93005 ELECTROCARDIOGRAM TRACING: CPT

## 2018-06-07 PROCEDURE — 87040 BLOOD CULTURE FOR BACTERIA: CPT

## 2018-06-07 PROCEDURE — 96375 TX/PRO/DX INJ NEW DRUG ADDON: CPT

## 2018-06-07 PROCEDURE — 80053 COMPREHEN METABOLIC PANEL: CPT

## 2018-06-07 PROCEDURE — 36415 COLL VENOUS BLD VENIPUNCTURE: CPT

## 2018-06-07 PROCEDURE — 85651 RBC SED RATE NONAUTOMATED: CPT

## 2018-06-07 PROCEDURE — 83735 ASSAY OF MAGNESIUM: CPT

## 2018-06-07 PROCEDURE — 71045 X-RAY EXAM CHEST 1 VIEW: CPT

## 2018-06-07 RX ADMIN — MORPHINE SULFATE PRN MG: 10 INJECTION INTRAVENOUS at 17:04

## 2018-06-07 RX ADMIN — AMOXICILLIN AND CLAVULANATE POTASSIUM SCH TAB: 500; 125 TABLET, FILM COATED ORAL at 14:46

## 2018-06-07 RX ADMIN — AMOXICILLIN AND CLAVULANATE POTASSIUM SCH TAB: 500; 125 TABLET, FILM COATED ORAL at 21:35

## 2018-06-07 RX ADMIN — ACETAMINOPHEN PRN MG: 325 TABLET, FILM COATED ORAL at 22:27

## 2018-06-07 RX ADMIN — MORPHINE SULFATE PRN MG: 10 INJECTION INTRAVENOUS at 20:30

## 2018-06-08 VITALS — SYSTOLIC BLOOD PRESSURE: 104 MMHG | DIASTOLIC BLOOD PRESSURE: 70 MMHG

## 2018-06-08 VITALS — DIASTOLIC BLOOD PRESSURE: 61 MMHG | SYSTOLIC BLOOD PRESSURE: 96 MMHG

## 2018-06-08 LAB
ALBUMIN SERPL-MCNC: 3.2 G/DL (ref 3.4–5)
ALP SERPL-CCNC: 130 U/L (ref 45–117)
ALT SERPL-CCNC: 63 U/L (ref 12–78)
ANION GAP SERPL CALC-SCNC: 6 MMOL/L (ref 5–15)
BASOPHILS # BLD AUTO: 0 X10^3/UL (ref 0–0.1)
BASOPHILS NFR BLD AUTO: 0 % (ref 0–1)
BILIRUB SERPL-MCNC: 0.8 MG/DL (ref 0.2–1)
CALCIUM SERPL-MCNC: 8.9 MG/DL (ref 8.5–10.1)
CHLORIDE SERPL-SCNC: 106 MMOL/L (ref 98–107)
CREAT SERPL-MCNC: 0.61 MG/DL (ref 0.55–1.02)
EOSINOPHIL # BLD AUTO: 0 X10^3/UL (ref 0–0.4)
EOSINOPHIL NFR BLD AUTO: 0 % (ref 1–7)
ERYTHROCYTE [DISTWIDTH] IN BLOOD BY AUTOMATED COUNT: 14.2 % (ref 9.6–15.2)
LYMPHOCYTES # BLD AUTO: 1.07 X10^3/UL (ref 1–3.4)
LYMPHOCYTES NFR BLD AUTO: 9 % (ref 22–44)
MCH RBC QN AUTO: 28.5 PG (ref 27–34.8)
MCHC RBC AUTO-ENTMCNC: 33.7 G/DL (ref 32.4–35.8)
MCV RBC AUTO: 84.5 FL (ref 80–100)
MD: (no result)
MONOCYTES # BLD AUTO: 0.39 X10^3/UL (ref 0.2–0.8)
MONOCYTES NFR BLD AUTO: 3 % (ref 2–9)
NEUTROPHILS # BLD AUTO: 10.71 X10^3/UL (ref 1.8–6.8)
NEUTROPHILS NFR BLD AUTO: 88 % (ref 42–75)
PLATELET # BLD AUTO: 373 X10^3/UL (ref 130–400)
PMV BLD AUTO: 9.7 FL (ref 7.4–10.4)
PROT SERPL-MCNC: 7.4 G/DL (ref 6.4–8.2)
RBC # BLD AUTO: 4.21 X10^6/UL (ref 3.82–5.3)

## 2018-06-08 RX ADMIN — AMOXICILLIN AND CLAVULANATE POTASSIUM SCH TAB: 500; 125 TABLET, FILM COATED ORAL at 05:42

## 2018-06-08 RX ADMIN — ACETAMINOPHEN PRN MG: 325 TABLET, FILM COATED ORAL at 03:40

## 2018-06-22 ENCOUNTER — HOSPITAL ENCOUNTER (EMERGENCY)
Dept: HOSPITAL 8 - ED | Age: 23
Discharge: HOME | End: 2018-06-22
Payer: COMMERCIAL

## 2018-06-22 VITALS — HEIGHT: 63 IN | BODY MASS INDEX: 30.04 KG/M2 | WEIGHT: 169.54 LBS

## 2018-06-22 VITALS — DIASTOLIC BLOOD PRESSURE: 85 MMHG | SYSTOLIC BLOOD PRESSURE: 126 MMHG

## 2018-06-22 DIAGNOSIS — I30.9: Primary | ICD-10-CM

## 2018-06-22 LAB
ALBUMIN SERPL-MCNC: 4.1 G/DL (ref 3.4–5)
ANION GAP SERPL CALC-SCNC: 7 MMOL/L (ref 5–15)
BASOPHILS # BLD AUTO: 0.1 X10^3/UL (ref 0–0.1)
BASOPHILS NFR BLD AUTO: 1 % (ref 0–1)
CALCIUM SERPL-MCNC: 8.9 MG/DL (ref 8.5–10.1)
CHLORIDE SERPL-SCNC: 108 MMOL/L (ref 98–107)
CREAT SERPL-MCNC: 1 MG/DL (ref 0.55–1.02)
EOSINOPHIL # BLD AUTO: 0.03 X10^3/UL (ref 0–0.4)
EOSINOPHIL NFR BLD AUTO: 0 % (ref 1–7)
ERYTHROCYTE [DISTWIDTH] IN BLOOD BY AUTOMATED COUNT: 15.5 % (ref 9.6–15.2)
LYMPHOCYTES # BLD AUTO: 2.01 X10^3/UL (ref 1–3.4)
LYMPHOCYTES NFR BLD AUTO: 13 % (ref 22–44)
MCH RBC QN AUTO: 28.1 PG (ref 27–34.8)
MCHC RBC AUTO-ENTMCNC: 33.3 G/DL (ref 32.4–35.8)
MCV RBC AUTO: 84.5 FL (ref 80–100)
MD: NO
MONOCYTES # BLD AUTO: 0.47 X10^3/UL (ref 0.2–0.8)
MONOCYTES NFR BLD AUTO: 3 % (ref 2–9)
NEUTROPHILS # BLD AUTO: 12.82 X10^3/UL (ref 1.8–6.8)
NEUTROPHILS NFR BLD AUTO: 83 % (ref 42–75)
PLATELET # BLD AUTO: 283 X10^3/UL (ref 130–400)
PMV BLD AUTO: 9.2 FL (ref 7.4–10.4)
RBC # BLD AUTO: 4.84 X10^6/UL (ref 3.82–5.3)
TROPONIN I SERPL-MCNC: < 0.015 NG/ML (ref 0–0.04)

## 2018-06-22 PROCEDURE — 85025 COMPLETE CBC W/AUTO DIFF WBC: CPT

## 2018-06-22 PROCEDURE — 99285 EMERGENCY DEPT VISIT HI MDM: CPT

## 2018-06-22 PROCEDURE — 84484 ASSAY OF TROPONIN QUANT: CPT

## 2018-06-22 PROCEDURE — 80048 BASIC METABOLIC PNL TOTAL CA: CPT

## 2018-06-22 PROCEDURE — 36415 COLL VENOUS BLD VENIPUNCTURE: CPT

## 2018-06-22 PROCEDURE — 71046 X-RAY EXAM CHEST 2 VIEWS: CPT

## 2018-06-22 PROCEDURE — 93005 ELECTROCARDIOGRAM TRACING: CPT

## 2018-06-22 PROCEDURE — 82040 ASSAY OF SERUM ALBUMIN: CPT

## 2018-07-16 ENCOUNTER — HOSPITAL ENCOUNTER (OUTPATIENT)
Dept: RADIOLOGY | Facility: MEDICAL CENTER | Age: 23
End: 2018-07-16
Attending: SURGERY
Payer: COMMERCIAL

## 2018-07-16 DIAGNOSIS — R10.9 STOMACH ACHE: ICD-10-CM

## 2018-07-16 PROCEDURE — 76700 US EXAM ABDOM COMPLETE: CPT

## 2018-07-21 ENCOUNTER — HOSPITAL ENCOUNTER (OUTPATIENT)
Facility: MEDICAL CENTER | Age: 23
End: 2018-07-22
Attending: EMERGENCY MEDICINE | Admitting: HOSPITALIST
Payer: COMMERCIAL

## 2018-07-21 ENCOUNTER — APPOINTMENT (OUTPATIENT)
Dept: RADIOLOGY | Facility: MEDICAL CENTER | Age: 23
End: 2018-07-21
Attending: EMERGENCY MEDICINE
Payer: COMMERCIAL

## 2018-07-21 DIAGNOSIS — I31.39 PERICARDIAL EFFUSION: ICD-10-CM

## 2018-07-21 DIAGNOSIS — R00.0 TACHYCARDIA: ICD-10-CM

## 2018-07-21 DIAGNOSIS — E86.0 DEHYDRATION: ICD-10-CM

## 2018-07-21 DIAGNOSIS — D72.829 LEUKOCYTOSIS, UNSPECIFIED TYPE: ICD-10-CM

## 2018-07-21 DIAGNOSIS — I30.9 ACUTE PERICARDITIS ASSOCIATED WITH OTHER DISEASE: ICD-10-CM

## 2018-07-21 DIAGNOSIS — R07.9 ACUTE CHEST PAIN: ICD-10-CM

## 2018-07-21 DIAGNOSIS — R79.89 ELEVATED D-DIMER: ICD-10-CM

## 2018-07-21 PROBLEM — J90 PLEURAL EFFUSION: Status: ACTIVE | Noted: 2018-07-21

## 2018-07-21 PROBLEM — R11.0 NAUSEA: Status: ACTIVE | Noted: 2018-07-21

## 2018-07-21 LAB
ALBUMIN SERPL BCP-MCNC: 4.4 G/DL (ref 3.2–4.9)
ALBUMIN/GLOB SERPL: 1.4 G/DL
ALP SERPL-CCNC: 60 U/L (ref 30–99)
ALT SERPL-CCNC: 19 U/L (ref 2–50)
ANION GAP SERPL CALC-SCNC: 10 MMOL/L (ref 0–11.9)
APPEARANCE UR: CLEAR
APTT PPP: 31 SEC (ref 24.7–36)
AST SERPL-CCNC: 14 U/L (ref 12–45)
BACTERIA #/AREA URNS HPF: ABNORMAL /HPF
BASOPHILS # BLD AUTO: 0.2 % (ref 0–1.8)
BASOPHILS # BLD: 0.03 K/UL (ref 0–0.12)
BILIRUB SERPL-MCNC: 1.4 MG/DL (ref 0.1–1.5)
BILIRUB UR QL STRIP.AUTO: NEGATIVE
BNP SERPL-MCNC: 38 PG/ML (ref 0–100)
BUN SERPL-MCNC: 10 MG/DL (ref 8–22)
CALCIUM SERPL-MCNC: 9.5 MG/DL (ref 8.5–10.5)
CHLORIDE SERPL-SCNC: 103 MMOL/L (ref 96–112)
CO2 SERPL-SCNC: 22 MMOL/L (ref 20–33)
COLOR UR: ABNORMAL
CREAT SERPL-MCNC: 0.61 MG/DL (ref 0.5–1.4)
DEPRECATED D DIMER PPP IA-ACNC: 465 NG/ML(D-DU)
EOSINOPHIL # BLD AUTO: 0 K/UL (ref 0–0.51)
EOSINOPHIL NFR BLD: 0 % (ref 0–6.9)
EPI CELLS #/AREA URNS HPF: ABNORMAL /HPF
ERYTHROCYTE [DISTWIDTH] IN BLOOD BY AUTOMATED COUNT: 49.3 FL (ref 35.9–50)
GLOBULIN SER CALC-MCNC: 3.2 G/DL (ref 1.9–3.5)
GLUCOSE SERPL-MCNC: 103 MG/DL (ref 65–99)
GLUCOSE UR STRIP.AUTO-MCNC: NEGATIVE MG/DL
HCG SERPL QL: NEGATIVE
HCT VFR BLD AUTO: 35.9 % (ref 37–47)
HGB BLD-MCNC: 12.1 G/DL (ref 12–16)
HYALINE CASTS #/AREA URNS LPF: ABNORMAL /LPF
IMM GRANULOCYTES # BLD AUTO: 0.1 K/UL (ref 0–0.11)
IMM GRANULOCYTES NFR BLD AUTO: 0.6 % (ref 0–0.9)
INR PPP: 1.26 (ref 0.87–1.13)
KETONES UR STRIP.AUTO-MCNC: 80 MG/DL
LEUKOCYTE ESTERASE UR QL STRIP.AUTO: NEGATIVE
LIPASE SERPL-CCNC: 5 U/L (ref 11–82)
LYMPHOCYTES # BLD AUTO: 1.39 K/UL (ref 1–4.8)
LYMPHOCYTES NFR BLD: 8.8 % (ref 22–41)
MCH RBC QN AUTO: 28.8 PG (ref 27–33)
MCHC RBC AUTO-ENTMCNC: 33.7 G/DL (ref 33.6–35)
MCV RBC AUTO: 85.5 FL (ref 81.4–97.8)
MICRO URNS: ABNORMAL
MONOCYTES # BLD AUTO: 1.47 K/UL (ref 0–0.85)
MONOCYTES NFR BLD AUTO: 9.3 % (ref 0–13.4)
NEUTROPHILS # BLD AUTO: 12.8 K/UL (ref 2–7.15)
NEUTROPHILS NFR BLD: 81.1 % (ref 44–72)
NITRITE UR QL STRIP.AUTO: NEGATIVE
NRBC # BLD AUTO: 0 K/UL
NRBC BLD-RTO: 0 /100 WBC
PH UR STRIP.AUTO: 5.5 [PH]
PLATELET # BLD AUTO: 248 K/UL (ref 164–446)
PMV BLD AUTO: 10.8 FL (ref 9–12.9)
POTASSIUM SERPL-SCNC: 3.7 MMOL/L (ref 3.6–5.5)
PROT SERPL-MCNC: 7.6 G/DL (ref 6–8.2)
PROT UR QL STRIP: 100 MG/DL
PROTHROMBIN TIME: 15.5 SEC (ref 12–14.6)
RBC # BLD AUTO: 4.2 M/UL (ref 4.2–5.4)
RBC # URNS HPF: ABNORMAL /HPF
RBC UR QL AUTO: NEGATIVE
SODIUM SERPL-SCNC: 135 MMOL/L (ref 135–145)
SP GR UR STRIP.AUTO: 1.03
TROPONIN I SERPL-MCNC: <0.01 NG/ML (ref 0–0.04)
UROBILINOGEN UR STRIP.AUTO-MCNC: 1 MG/DL
WBC # BLD AUTO: 15.8 K/UL (ref 4.8–10.8)
WBC #/AREA URNS HPF: ABNORMAL /HPF

## 2018-07-21 PROCEDURE — 93005 ELECTROCARDIOGRAM TRACING: CPT | Performed by: EMERGENCY MEDICINE

## 2018-07-21 PROCEDURE — 93005 ELECTROCARDIOGRAM TRACING: CPT

## 2018-07-21 PROCEDURE — 94760 N-INVAS EAR/PLS OXIMETRY 1: CPT

## 2018-07-21 PROCEDURE — 81001 URINALYSIS AUTO W/SCOPE: CPT

## 2018-07-21 PROCEDURE — 83880 ASSAY OF NATRIURETIC PEPTIDE: CPT

## 2018-07-21 PROCEDURE — 84443 ASSAY THYROID STIM HORMONE: CPT

## 2018-07-21 PROCEDURE — 700102 HCHG RX REV CODE 250 W/ 637 OVERRIDE(OP): Performed by: EMERGENCY MEDICINE

## 2018-07-21 PROCEDURE — 700105 HCHG RX REV CODE 258: Performed by: EMERGENCY MEDICINE

## 2018-07-21 PROCEDURE — 85730 THROMBOPLASTIN TIME PARTIAL: CPT

## 2018-07-21 PROCEDURE — 85610 PROTHROMBIN TIME: CPT

## 2018-07-21 PROCEDURE — 99219 PR INITIAL OBSERVATION CARE,LEVL II: CPT | Performed by: HOSPITALIST

## 2018-07-21 PROCEDURE — 84484 ASSAY OF TROPONIN QUANT: CPT

## 2018-07-21 PROCEDURE — 86140 C-REACTIVE PROTEIN: CPT

## 2018-07-21 PROCEDURE — 96361 HYDRATE IV INFUSION ADD-ON: CPT

## 2018-07-21 PROCEDURE — 85025 COMPLETE CBC W/AUTO DIFF WBC: CPT

## 2018-07-21 PROCEDURE — 80053 COMPREHEN METABOLIC PANEL: CPT

## 2018-07-21 PROCEDURE — 71045 X-RAY EXAM CHEST 1 VIEW: CPT

## 2018-07-21 PROCEDURE — 84703 CHORIONIC GONADOTROPIN ASSAY: CPT

## 2018-07-21 PROCEDURE — 99285 EMERGENCY DEPT VISIT HI MDM: CPT

## 2018-07-21 PROCEDURE — G0378 HOSPITAL OBSERVATION PER HR: HCPCS

## 2018-07-21 PROCEDURE — 85379 FIBRIN DEGRADATION QUANT: CPT

## 2018-07-21 PROCEDURE — 83690 ASSAY OF LIPASE: CPT

## 2018-07-21 PROCEDURE — 700117 HCHG RX CONTRAST REV CODE 255: Performed by: EMERGENCY MEDICINE

## 2018-07-21 PROCEDURE — 71275 CT ANGIOGRAPHY CHEST: CPT

## 2018-07-21 PROCEDURE — 36415 COLL VENOUS BLD VENIPUNCTURE: CPT

## 2018-07-21 PROCEDURE — A9270 NON-COVERED ITEM OR SERVICE: HCPCS | Performed by: EMERGENCY MEDICINE

## 2018-07-21 RX ORDER — ASPIRIN 81 MG/1
324 TABLET, CHEWABLE ORAL DAILY
Status: DISCONTINUED | OUTPATIENT
Start: 2018-07-22 | End: 2018-07-22 | Stop reason: HOSPADM

## 2018-07-21 RX ORDER — ASPIRIN 325 MG
325 TABLET ORAL DAILY
Status: DISCONTINUED | OUTPATIENT
Start: 2018-07-22 | End: 2018-07-22 | Stop reason: HOSPADM

## 2018-07-21 RX ORDER — OXYCODONE HYDROCHLORIDE 10 MG/1
10 TABLET ORAL
Status: DISCONTINUED | OUTPATIENT
Start: 2018-07-21 | End: 2018-07-22 | Stop reason: HOSPADM

## 2018-07-21 RX ORDER — ASPIRIN 300 MG/1
300 SUPPOSITORY RECTAL DAILY
Status: DISCONTINUED | OUTPATIENT
Start: 2018-07-22 | End: 2018-07-22 | Stop reason: HOSPADM

## 2018-07-21 RX ORDER — SODIUM CHLORIDE 9 MG/ML
1000 INJECTION, SOLUTION INTRAVENOUS ONCE
Status: COMPLETED | OUTPATIENT
Start: 2018-07-21 | End: 2018-07-21

## 2018-07-21 RX ORDER — PROMETHAZINE HYDROCHLORIDE 25 MG/1
12.5-25 TABLET ORAL EVERY 4 HOURS PRN
Status: DISCONTINUED | OUTPATIENT
Start: 2018-07-21 | End: 2018-07-22 | Stop reason: HOSPADM

## 2018-07-21 RX ORDER — SODIUM CHLORIDE 9 MG/ML
500 INJECTION, SOLUTION INTRAVENOUS ONCE
Status: COMPLETED | OUTPATIENT
Start: 2018-07-22 | End: 2018-07-22

## 2018-07-21 RX ORDER — ASPIRIN 81 MG/1
324 TABLET, CHEWABLE ORAL ONCE
Status: COMPLETED | OUTPATIENT
Start: 2018-07-21 | End: 2018-07-21

## 2018-07-21 RX ORDER — POLYETHYLENE GLYCOL 3350 17 G/17G
1 POWDER, FOR SOLUTION ORAL
Status: DISCONTINUED | OUTPATIENT
Start: 2018-07-21 | End: 2018-07-22 | Stop reason: HOSPADM

## 2018-07-21 RX ORDER — AMOXICILLIN 250 MG
2 CAPSULE ORAL 2 TIMES DAILY
Status: DISCONTINUED | OUTPATIENT
Start: 2018-07-22 | End: 2018-07-22 | Stop reason: HOSPADM

## 2018-07-21 RX ORDER — PREDNISONE 20 MG/1
40 TABLET ORAL DAILY
Status: DISCONTINUED | OUTPATIENT
Start: 2018-07-22 | End: 2018-07-22 | Stop reason: HOSPADM

## 2018-07-21 RX ORDER — HYDROMORPHONE HYDROCHLORIDE 2 MG/ML
0.5 INJECTION, SOLUTION INTRAMUSCULAR; INTRAVENOUS; SUBCUTANEOUS
Status: DISCONTINUED | OUTPATIENT
Start: 2018-07-21 | End: 2018-07-22 | Stop reason: HOSPADM

## 2018-07-21 RX ORDER — HEPARIN SODIUM 5000 [USP'U]/ML
5000 INJECTION, SOLUTION INTRAVENOUS; SUBCUTANEOUS EVERY 8 HOURS
Status: DISCONTINUED | OUTPATIENT
Start: 2018-07-22 | End: 2018-07-22 | Stop reason: HOSPADM

## 2018-07-21 RX ORDER — BISACODYL 10 MG
10 SUPPOSITORY, RECTAL RECTAL
Status: DISCONTINUED | OUTPATIENT
Start: 2018-07-21 | End: 2018-07-22 | Stop reason: HOSPADM

## 2018-07-21 RX ORDER — PROMETHAZINE HYDROCHLORIDE 25 MG/1
12.5-25 SUPPOSITORY RECTAL EVERY 4 HOURS PRN
Status: DISCONTINUED | OUTPATIENT
Start: 2018-07-21 | End: 2018-07-22 | Stop reason: HOSPADM

## 2018-07-21 RX ORDER — PREDNISONE 20 MG/1
40 TABLET ORAL DAILY
Status: DISCONTINUED | OUTPATIENT
Start: 2018-07-22 | End: 2018-07-21

## 2018-07-21 RX ORDER — ONDANSETRON 4 MG/1
4 TABLET, ORALLY DISINTEGRATING ORAL EVERY 4 HOURS PRN
Status: DISCONTINUED | OUTPATIENT
Start: 2018-07-21 | End: 2018-07-22 | Stop reason: HOSPADM

## 2018-07-21 RX ORDER — OXYCODONE HYDROCHLORIDE 5 MG/1
5 TABLET ORAL
Status: DISCONTINUED | OUTPATIENT
Start: 2018-07-21 | End: 2018-07-22 | Stop reason: HOSPADM

## 2018-07-21 RX ORDER — ONDANSETRON 2 MG/ML
4 INJECTION INTRAMUSCULAR; INTRAVENOUS EVERY 4 HOURS PRN
Status: DISCONTINUED | OUTPATIENT
Start: 2018-07-21 | End: 2018-07-22 | Stop reason: HOSPADM

## 2018-07-21 RX ADMIN — ASPIRIN 324 MG: 81 TABLET, CHEWABLE ORAL at 21:54

## 2018-07-21 RX ADMIN — SODIUM CHLORIDE 1000 ML: 9 INJECTION, SOLUTION INTRAVENOUS at 21:55

## 2018-07-21 RX ADMIN — IOHEXOL 50 ML: 350 INJECTION, SOLUTION INTRAVENOUS at 22:50

## 2018-07-21 ASSESSMENT — ENCOUNTER SYMPTOMS
WEAKNESS: 1
BLURRED VISION: 0
FEVER: 0
EYE DISCHARGE: 0
BRUISES/BLEEDS EASILY: 0
PALPITATIONS: 0
VOMITING: 0
FLANK PAIN: 0
SHORTNESS OF BREATH: 1
HEARTBURN: 0
CHILLS: 0
DOUBLE VISION: 0
SENSORY CHANGE: 0
HALLUCINATIONS: 0
DIZZINESS: 0
ABDOMINAL PAIN: 0
DEPRESSION: 0
COUGH: 0
SPEECH CHANGE: 0
HEMOPTYSIS: 0
MYALGIAS: 0
FOCAL WEAKNESS: 0
NAUSEA: 0

## 2018-07-21 ASSESSMENT — LIFESTYLE VARIABLES: SUBSTANCE_ABUSE: 0

## 2018-07-21 ASSESSMENT — PAIN SCALES - GENERAL: PAINLEVEL_OUTOF10: 5

## 2018-07-22 ENCOUNTER — PATIENT OUTREACH (OUTPATIENT)
Dept: HEALTH INFORMATION MANAGEMENT | Facility: OTHER | Age: 23
End: 2018-07-22

## 2018-07-22 VITALS
DIASTOLIC BLOOD PRESSURE: 59 MMHG | OXYGEN SATURATION: 97 % | HEIGHT: 63 IN | WEIGHT: 181.88 LBS | TEMPERATURE: 97.5 F | BODY MASS INDEX: 32.23 KG/M2 | HEART RATE: 87 BPM | RESPIRATION RATE: 18 BRPM | SYSTOLIC BLOOD PRESSURE: 104 MMHG

## 2018-07-22 PROBLEM — I31.9 PERICARDITIS: Status: ACTIVE | Noted: 2018-07-22

## 2018-07-22 PROBLEM — R00.0 TACHYCARDIA: Status: RESOLVED | Noted: 2018-07-21 | Resolved: 2018-07-22

## 2018-07-22 PROBLEM — E86.0 DEHYDRATION: Status: RESOLVED | Noted: 2018-07-21 | Resolved: 2018-07-22

## 2018-07-22 PROBLEM — R11.0 NAUSEA: Status: RESOLVED | Noted: 2018-07-21 | Resolved: 2018-07-22

## 2018-07-22 PROBLEM — I31.39 PERICARDIAL EFFUSION: Status: ACTIVE | Noted: 2018-07-22

## 2018-07-22 LAB
ALBUMIN SERPL BCP-MCNC: 3.8 G/DL (ref 3.2–4.9)
ALBUMIN/GLOB SERPL: 1.5 G/DL
ALP SERPL-CCNC: 49 U/L (ref 30–99)
ALT SERPL-CCNC: 15 U/L (ref 2–50)
ANION GAP SERPL CALC-SCNC: 7 MMOL/L (ref 0–11.9)
AST SERPL-CCNC: 27 U/L (ref 12–45)
BASOPHILS # BLD AUTO: 0.3 % (ref 0–1.8)
BASOPHILS # BLD: 0.03 K/UL (ref 0–0.12)
BILIRUB SERPL-MCNC: 1.3 MG/DL (ref 0.1–1.5)
BUN SERPL-MCNC: 9 MG/DL (ref 8–22)
CALCIUM SERPL-MCNC: 8.5 MG/DL (ref 8.5–10.5)
CHLORIDE SERPL-SCNC: 107 MMOL/L (ref 96–112)
CHOLEST SERPL-MCNC: 102 MG/DL (ref 100–199)
CO2 SERPL-SCNC: 20 MMOL/L (ref 20–33)
CREAT SERPL-MCNC: 0.62 MG/DL (ref 0.5–1.4)
CRP SERPL HS-MCNC: 19.56 MG/DL (ref 0–0.75)
EKG IMPRESSION: NORMAL
EOSINOPHIL # BLD AUTO: 0.02 K/UL (ref 0–0.51)
EOSINOPHIL NFR BLD: 0.2 % (ref 0–6.9)
ERYTHROCYTE [DISTWIDTH] IN BLOOD BY AUTOMATED COUNT: 53.2 FL (ref 35.9–50)
ERYTHROCYTE [SEDIMENTATION RATE] IN BLOOD BY WESTERGREN METHOD: 71 MM/HOUR (ref 0–20)
EST. AVERAGE GLUCOSE BLD GHB EST-MCNC: 103 MG/DL
GLOBULIN SER CALC-MCNC: 2.6 G/DL (ref 1.9–3.5)
GLUCOSE SERPL-MCNC: 89 MG/DL (ref 65–99)
HBA1C MFR BLD: 5.2 % (ref 0–5.6)
HCT VFR BLD AUTO: 32.2 % (ref 37–47)
HDLC SERPL-MCNC: 28 MG/DL
HGB BLD-MCNC: 10.4 G/DL (ref 12–16)
IMM GRANULOCYTES # BLD AUTO: 0.09 K/UL (ref 0–0.11)
IMM GRANULOCYTES NFR BLD AUTO: 0.8 % (ref 0–0.9)
LDLC SERPL CALC-MCNC: 59 MG/DL
LV EJECT FRACT  99904: 60
LV EJECT FRACT MOD 2C 99903: 51.88
LV EJECT FRACT MOD 4C 99902: 63.62
LV EJECT FRACT MOD BP 99901: 57.88
LYMPHOCYTES # BLD AUTO: 1.97 K/UL (ref 1–4.8)
LYMPHOCYTES NFR BLD: 16.7 % (ref 22–41)
MCH RBC QN AUTO: 28.7 PG (ref 27–33)
MCHC RBC AUTO-ENTMCNC: 32.3 G/DL (ref 33.6–35)
MCV RBC AUTO: 89 FL (ref 81.4–97.8)
MONOCYTES # BLD AUTO: 0.96 K/UL (ref 0–0.85)
MONOCYTES NFR BLD AUTO: 8.1 % (ref 0–13.4)
NEUTROPHILS # BLD AUTO: 8.73 K/UL (ref 2–7.15)
NEUTROPHILS NFR BLD: 73.9 % (ref 44–72)
NRBC # BLD AUTO: 0 K/UL
NRBC BLD-RTO: 0 /100 WBC
PLATELET # BLD AUTO: 185 K/UL (ref 164–446)
PMV BLD AUTO: 11.8 FL (ref 9–12.9)
POTASSIUM SERPL-SCNC: 4.7 MMOL/L (ref 3.6–5.5)
PROT SERPL-MCNC: 6.4 G/DL (ref 6–8.2)
RBC # BLD AUTO: 3.62 M/UL (ref 4.2–5.4)
SODIUM SERPL-SCNC: 134 MMOL/L (ref 135–145)
TRIGL SERPL-MCNC: 76 MG/DL (ref 0–149)
TROPONIN I SERPL-MCNC: <0.01 NG/ML (ref 0–0.04)
TROPONIN I SERPL-MCNC: <0.01 NG/ML (ref 0–0.04)
TSH SERPL DL<=0.005 MIU/L-ACNC: 1.05 UIU/ML (ref 0.38–5.33)
WBC # BLD AUTO: 11.8 K/UL (ref 4.8–10.8)

## 2018-07-22 PROCEDURE — 85652 RBC SED RATE AUTOMATED: CPT

## 2018-07-22 PROCEDURE — 700105 HCHG RX REV CODE 258: Performed by: HOSPITALIST

## 2018-07-22 PROCEDURE — 85025 COMPLETE CBC W/AUTO DIFF WBC: CPT

## 2018-07-22 PROCEDURE — 96372 THER/PROPH/DIAG INJ SC/IM: CPT

## 2018-07-22 PROCEDURE — 83036 HEMOGLOBIN GLYCOSYLATED A1C: CPT

## 2018-07-22 PROCEDURE — 93005 ELECTROCARDIOGRAM TRACING: CPT | Performed by: HOSPITALIST

## 2018-07-22 PROCEDURE — 700102 HCHG RX REV CODE 250 W/ 637 OVERRIDE(OP): Performed by: NURSE PRACTITIONER

## 2018-07-22 PROCEDURE — A9270 NON-COVERED ITEM OR SERVICE: HCPCS | Performed by: NURSE PRACTITIONER

## 2018-07-22 PROCEDURE — 700111 HCHG RX REV CODE 636 W/ 250 OVERRIDE (IP): Performed by: HOSPITALIST

## 2018-07-22 PROCEDURE — 700102 HCHG RX REV CODE 250 W/ 637 OVERRIDE(OP): Performed by: HOSPITALIST

## 2018-07-22 PROCEDURE — 80061 LIPID PANEL: CPT

## 2018-07-22 PROCEDURE — A9270 NON-COVERED ITEM OR SERVICE: HCPCS | Performed by: HOSPITALIST

## 2018-07-22 PROCEDURE — 96376 TX/PRO/DX INJ SAME DRUG ADON: CPT

## 2018-07-22 PROCEDURE — 80053 COMPREHEN METABOLIC PANEL: CPT

## 2018-07-22 PROCEDURE — 96374 THER/PROPH/DIAG INJ IV PUSH: CPT

## 2018-07-22 PROCEDURE — G0378 HOSPITAL OBSERVATION PER HR: HCPCS

## 2018-07-22 PROCEDURE — 36415 COLL VENOUS BLD VENIPUNCTURE: CPT

## 2018-07-22 PROCEDURE — 93306 TTE W/DOPPLER COMPLETE: CPT | Mod: 26 | Performed by: INTERNAL MEDICINE

## 2018-07-22 PROCEDURE — 93010 ELECTROCARDIOGRAM REPORT: CPT | Performed by: INTERNAL MEDICINE

## 2018-07-22 PROCEDURE — 84484 ASSAY OF TROPONIN QUANT: CPT | Mod: 91

## 2018-07-22 PROCEDURE — 99217 PR OBSERVATION CARE DISCHARGE: CPT | Performed by: HOSPITALIST

## 2018-07-22 PROCEDURE — 93306 TTE W/DOPPLER COMPLETE: CPT

## 2018-07-22 RX ORDER — OMEPRAZOLE 20 MG/1
20 CAPSULE, DELAYED RELEASE ORAL DAILY
Status: DISCONTINUED | OUTPATIENT
Start: 2018-07-22 | End: 2018-07-22 | Stop reason: HOSPADM

## 2018-07-22 RX ORDER — OXYCODONE HYDROCHLORIDE 5 MG/1
5 TABLET ORAL EVERY 6 HOURS PRN
Status: ON HOLD | COMMUNITY
Start: 2018-07-22 | End: 2018-07-22

## 2018-07-22 RX ORDER — OXYCODONE HYDROCHLORIDE 5 MG/1
5 TABLET ORAL EVERY 6 HOURS PRN
Qty: 8 TAB | Refills: 0 | Status: SHIPPED | OUTPATIENT
Start: 2018-07-22 | End: 2018-07-24

## 2018-07-22 RX ORDER — ONDANSETRON 4 MG/1
4 TABLET, ORALLY DISINTEGRATING ORAL EVERY 6 HOURS PRN
Qty: 10 TAB | Refills: 0 | Status: SHIPPED | OUTPATIENT
Start: 2018-07-22 | End: 2021-03-17

## 2018-07-22 RX ORDER — PREDNISONE 20 MG/1
20 TABLET ORAL DAILY
Qty: 4 TAB | Refills: 0 | Status: SHIPPED | OUTPATIENT
Start: 2018-07-22 | End: 2018-07-26

## 2018-07-22 RX ORDER — PREDNISONE 10 MG/1
10 TABLET ORAL DAILY
Status: ON HOLD | COMMUNITY
End: 2018-07-22

## 2018-07-22 RX ORDER — COLCHICINE 0.6 MG/1
0.6 TABLET ORAL 2 TIMES DAILY
Qty: 28 TAB | Refills: 0 | Status: SHIPPED | OUTPATIENT
Start: 2018-07-22 | End: 2018-07-22

## 2018-07-22 RX ORDER — COLCHICINE 0.6 MG/1
0.6 TABLET ORAL 2 TIMES DAILY
Status: DISCONTINUED | OUTPATIENT
Start: 2018-07-22 | End: 2018-07-22 | Stop reason: HOSPADM

## 2018-07-22 RX ORDER — OMEPRAZOLE 20 MG/1
20 CAPSULE, DELAYED RELEASE ORAL DAILY
Qty: 30 CAP | Refills: 0 | Status: SHIPPED | OUTPATIENT
Start: 2018-07-23 | End: 2018-07-22

## 2018-07-22 RX ORDER — COLCHICINE 0.6 MG/1
0.6 TABLET ORAL 2 TIMES DAILY
COMMUNITY
Start: 2018-07-22 | End: 2018-08-05

## 2018-07-22 RX ORDER — OMEPRAZOLE 20 MG/1
20 CAPSULE, DELAYED RELEASE ORAL DAILY
COMMUNITY
End: 2021-03-17

## 2018-07-22 RX ADMIN — HEPARIN SODIUM 5000 UNITS: 5000 INJECTION, SOLUTION INTRAVENOUS; SUBCUTANEOUS at 08:13

## 2018-07-22 RX ADMIN — OXYCODONE HYDROCHLORIDE 10 MG: 10 TABLET ORAL at 01:18

## 2018-07-22 RX ADMIN — OXYCODONE HYDROCHLORIDE 5 MG: 5 TABLET ORAL at 03:51

## 2018-07-22 RX ADMIN — OXYCODONE HYDROCHLORIDE 5 MG: 5 TABLET ORAL at 00:14

## 2018-07-22 RX ADMIN — HEPARIN SODIUM 5000 UNITS: 5000 INJECTION, SOLUTION INTRAVENOUS; SUBCUTANEOUS at 00:03

## 2018-07-22 RX ADMIN — ONDANSETRON HYDROCHLORIDE 4 MG: 2 INJECTION, SOLUTION INTRAMUSCULAR; INTRAVENOUS at 15:31

## 2018-07-22 RX ADMIN — OMEPRAZOLE 20 MG: 20 CAPSULE, DELAYED RELEASE ORAL at 16:19

## 2018-07-22 RX ADMIN — ASPIRIN 325 MG: 325 TABLET ORAL at 05:55

## 2018-07-22 RX ADMIN — OXYCODONE HYDROCHLORIDE 10 MG: 10 TABLET ORAL at 07:21

## 2018-07-22 RX ADMIN — COLCHICINE 0.6 MG: 0.6 TABLET, FILM COATED ORAL at 18:19

## 2018-07-22 RX ADMIN — HEPARIN SODIUM 5000 UNITS: 5000 INJECTION, SOLUTION INTRAVENOUS; SUBCUTANEOUS at 14:00

## 2018-07-22 RX ADMIN — OXYCODONE HYDROCHLORIDE 10 MG: 10 TABLET ORAL at 10:44

## 2018-07-22 RX ADMIN — PREDNISONE 40 MG: 20 TABLET ORAL at 05:56

## 2018-07-22 RX ADMIN — SODIUM CHLORIDE 500 ML: 9 INJECTION, SOLUTION INTRAVENOUS at 00:03

## 2018-07-22 RX ADMIN — COLCHICINE 0.6 MG: 0.6 TABLET, FILM COATED ORAL at 11:40

## 2018-07-22 RX ADMIN — ONDANSETRON 4 MG: 4 TABLET, ORALLY DISINTEGRATING ORAL at 19:57

## 2018-07-22 RX ADMIN — OXYCODONE HYDROCHLORIDE 5 MG: 5 TABLET ORAL at 19:57

## 2018-07-22 RX ADMIN — OXYCODONE HYDROCHLORIDE 10 MG: 10 TABLET ORAL at 14:01

## 2018-07-22 RX ADMIN — ONDANSETRON HYDROCHLORIDE 4 MG: 2 INJECTION, SOLUTION INTRAMUSCULAR; INTRAVENOUS at 10:04

## 2018-07-22 RX ADMIN — ONDANSETRON HYDROCHLORIDE 4 MG: 2 INJECTION, SOLUTION INTRAMUSCULAR; INTRAVENOUS at 00:14

## 2018-07-22 ASSESSMENT — LIFESTYLE VARIABLES
EVER_SMOKED: NEVER
EVER_SMOKED: NEVER

## 2018-07-22 ASSESSMENT — COPD QUESTIONNAIRES
HAVE YOU SMOKED AT LEAST 100 CIGARETTES IN YOUR ENTIRE LIFE: NO/DON'T KNOW
DURING THE PAST 4 WEEKS HOW MUCH DID YOU FEEL SHORT OF BREATH: SOME OF THE TIME
DO YOU EVER COUGH UP ANY MUCUS OR PHLEGM?: NO/ONLY WITH OCCASIONAL COLDS OR INFECTIONS
COPD SCREENING SCORE: 1

## 2018-07-22 ASSESSMENT — PATIENT HEALTH QUESTIONNAIRE - PHQ9
1. LITTLE INTEREST OR PLEASURE IN DOING THINGS: NOT AT ALL
SUM OF ALL RESPONSES TO PHQ9 QUESTIONS 1 AND 2: 0
2. FEELING DOWN, DEPRESSED, IRRITABLE, OR HOPELESS: NOT AT ALL

## 2018-07-22 ASSESSMENT — PAIN SCALES - GENERAL
PAINLEVEL_OUTOF10: 6
PAINLEVEL_OUTOF10: 5

## 2018-07-22 NOTE — ED PROVIDER NOTES
"ED PROVIDER NOTE     Scribed for Wm Aleman M.D. by Reddy Hill. 7/21/2018, 9:24 PM.    CHIEF COMPLAINT  Chief Complaint   Patient presents with   • Chest Pain     Pt. states centralized chest pain that gets worse when she lays down or sits up or with exacerbation. Pt. states recent dx of percardial effusion and pleural effusion back in May at [facility] [place]. Pt. states SOB with chest pain. Pt. describes the pain as a pressure.       HPI    Primary care provider: Dottie Roberson M.D.  Means of arrival: Walk in  History obtained from: Patient  History limited by: None    Theodora Ponce is a 22 y.o. female who presents to the ED complaining of centralized, retrosternal, nonradiating chest pain onset yesterday. Patient states the pain possibly started out with neck pain, but she thinks she may have slept on her neck in bad position last night. Patient describes the chest pain as \"like something sitting on my chest.\" The pain is exacerbated when laying down with a sharp quality for a period of time before becoming dull. The pain is also worsened when sitting up, with deep breaths, and with exertion such as when walking up stairs. Patient also reports shortness of breath, nausea, sweats, and chills. She notes recently having a left leg cramp which lasted for 3 days and occurred prior to onset of her current chest pain. She denies any vomiting, recorded fevers, leg swelling, dysuria, rashes, or abdominal pain. She denies any recent injuries or falls. Patient reports having a recent diagnosis of pericardial effusion and pleural effusion in May 2018 for which she had a thoracentesis. She had an appendectomy in April 2018 and cholecystectomy in May 2018. She denies history of blood clots. Patient notes she is on a course of prednisone which she will finish tomorrow. She is otherwise not on any other daily medications. She denies any other health issues prior to her diagnoses in the last few months. She follows " "with a cardiologist in [place]. Patient notes her last menstrual period was last month. She notes family history of heart issues (CHF, valve issues) but no family history of MI.       REVIEW OF SYSTEMS  Constitutional: Positive chills and subjective fevers.   Respiratory: Positive shortness of breath, particularly with exertion.   Cardiovascular: Positive chest pain. No syncope.   Gastrointestinal: Positive nausea. Negative vomiting, abdominal pain  Genitourinary: Negative for dysuria  Musculoskeletal: Negative leg swelling  Skin: Positive sweats. Negative rashes  All other systems reviewed and are negative.  C    PAST MEDICAL HISTORY   has a past medical history of Pericarditis (05/2018) and Tonsillitis.    PAST FAMILY HISTORY  Family history of heart valve issues and failure, but no family history of MI/CAD    SOCIAL HISTORY  Social History     Social History Main Topics   • Smoking status: Never Smoker   • Alcohol use No      Comment: weekends   • Drug use: No       SURGICAL HISTORY   has a past surgical history that includes tonsillectomy (2/24/2010); appendectomy (04/2018); and cholecystectomy (05/2018).    CURRENT MEDICATIONS  No current facility-administered medications on file prior to encounter.      Current Outpatient Prescriptions on File Prior to Encounter   Medication Sig Dispense Refill   • lidocaine viscous 2% (XYLOCAINE) 2 % Solution Take 15 mL by mouth as needed for Throat/Mouth Pain. 120 mL 1   • ondansetron (ZOFRAN ODT) 4 MG TABLET DISPERSIBLE Take 1 Tab by mouth every 8 hours as needed for Nausea/Vomiting. 10 Tab 0   • AMOXICILLIN by Does not apply route.     • Phenol-Phenolate Sodium (THROAT SPRAY MT) Spray  in mouth/throat.     • TYLENOL 8 HOUR PO Take  by mouth.        ALLERGIES  Allergies   Allergen Reactions   • Vicodin [Hydrocodone-Acetaminophen] Hives       PHYSICAL EXAM  VITAL SIGNS: /71   Pulse (!) 130   Temp 37.1 °C (98.8 °F) (Temporal)   Resp 16   Ht 1.6 m (5' 3\")   Wt " 80.7 kg (177 lb 14.6 oz)   SpO2 98%   BMI 31.52 kg/m²    Pulse ox interpretation: On room air, I interpret this pulse ox as normal.  Constitutional: Well developed, well nourished. Sitting up in mild distress.  HEENT: Normocephalic, atraumatic. Posterior pharynx clear, mucous membranes slightly dry.  Eyes:  EOMI. Normal sclera.  Neck: Supple, Full range of motion, nontender.  Chest/Pulmonary: Diminished breath sounds to bilateral bases, no wheezes or rhonchi.  Cardiovascular: Tachycardic. Regular rhythm, no murmur or rub.   Abdomen: Soft, nontender, no rebound, guarding, or masses.  Back: No CVA tenderness, nontender midline, no step offs.  Musculoskeletal: No deformity, no edema.  Neuro: Clear speech, normal coordination, cranial nerves II-XII grossly intact.  Psych: Normal mood and affect.  Skin: No rashes, warm and dry.       DIAGNOSTIC STUDIES / PROCEDURES    LABS & EKG  Results for orders placed or performed during the hospital encounter of 07/21/18   CBC WITH DIFFERENTIAL   Result Value Ref Range    WBC 15.8 (H) 4.8 - 10.8 K/uL    RBC 4.20 4.20 - 5.40 M/uL    Hemoglobin 12.1 12.0 - 16.0 g/dL    Hematocrit 35.9 (L) 37.0 - 47.0 %    MCV 85.5 81.4 - 97.8 fL    MCH 28.8 27.0 - 33.0 pg    MCHC 33.7 33.6 - 35.0 g/dL    RDW 49.3 35.9 - 50.0 fL    Platelet Count 248 164 - 446 K/uL    MPV 10.8 9.0 - 12.9 fL    Neutrophils-Polys 81.10 (H) 44.00 - 72.00 %    Lymphocytes 8.80 (L) 22.00 - 41.00 %    Monocytes 9.30 0.00 - 13.40 %    Eosinophils 0.00 0.00 - 6.90 %    Basophils 0.20 0.00 - 1.80 %    Immature Granulocytes 0.60 0.00 - 0.90 %    Nucleated RBC 0.00 /100 WBC    Neutrophils (Absolute) 12.80 (H) 2.00 - 7.15 K/uL    Lymphs (Absolute) 1.39 1.00 - 4.80 K/uL    Monos (Absolute) 1.47 (H) 0.00 - 0.85 K/uL    Eos (Absolute) 0.00 0.00 - 0.51 K/uL    Baso (Absolute) 0.03 0.00 - 0.12 K/uL    Immature Granulocytes (abs) 0.10 0.00 - 0.11 K/uL    NRBC (Absolute) 0.00 K/uL   CMP   Result Value Ref Range    Sodium 135 135 - 145  mmol/L    Potassium 3.7 3.6 - 5.5 mmol/L    Chloride 103 96 - 112 mmol/L    Co2 22 20 - 33 mmol/L    Anion Gap 10.0 0.0 - 11.9    Glucose 103 (H) 65 - 99 mg/dL    Bun 10 8 - 22 mg/dL    Creatinine 0.61 0.50 - 1.40 mg/dL    Calcium 9.5 8.5 - 10.5 mg/dL    AST(SGOT) 14 12 - 45 U/L    ALT(SGPT) 19 2 - 50 U/L    Alkaline Phosphatase 60 30 - 99 U/L    Total Bilirubin 1.4 0.1 - 1.5 mg/dL    Albumin 4.4 3.2 - 4.9 g/dL    Total Protein 7.6 6.0 - 8.2 g/dL    Globulin 3.2 1.9 - 3.5 g/dL    A-G Ratio 1.4 g/dL   LIPASE   Result Value Ref Range    Lipase 5 (L) 11 - 82 U/L   BETA-HCG QUALITATIVE SERUM   Result Value Ref Range    Beta-Hcg Qualitative Serum Negative Negative   URINALYSIS,CULTURE IF INDICATED   Result Value Ref Range    Color DK Yellow     Character Clear     Specific Gravity 1.030 <1.035    Ph 5.5 5.0 - 8.0    Glucose Negative Negative mg/dL    Ketones 80 (A) Negative mg/dL    Protein 100 (A) Negative mg/dL    Bilirubin Negative Negative    Urobilinogen, Urine 1.0 Negative    Nitrite Negative Negative    Leukocyte Esterase Negative Negative    Occult Blood Negative Negative    Micro Urine Req Microscopic    TROPONIN   Result Value Ref Range    Troponin I <0.01 0.00 - 0.04 ng/mL   BNP   Result Value Ref Range    B Natriuretic Peptide 38 0 - 100 pg/mL   D-DIMER   Result Value Ref Range    D-Dimer Screen 465 (H) <250 ng/mL(D-DU)   PT/INR   Result Value Ref Range    PT 15.5 (H) 12.0 - 14.6 sec    INR 1.26 (H) 0.87 - 1.13   PTT   Result Value Ref Range    APTT 31.0 24.7 - 36.0 sec   URINE MICROSCOPIC (W/UA)   Result Value Ref Range    WBC 2-5 /hpf    RBC 0-2 /hpf    Bacteria Few (A) None /hpf    Epithelial Cells Few /hpf    Hyaline Cast 0-2 /lpf   ESTIMATED GFR   Result Value Ref Range    GFR If African American >60 >60 mL/min/1.73 m 2    GFR If Non African American >60 >60 mL/min/1.73 m 2   Troponin in four (4) hours   Result Value Ref Range    Troponin I <0.01 0.00 - 0.04 ng/mL   [facility]   Result Value Ref  Range    Sed Rate Westergren 71 (H) 0 - 20 mm/hour   CBC with Differential   Result Value Ref Range    WBC 11.8 (H) 4.8 - 10.8 K/uL    RBC 3.62 (L) 4.20 - 5.40 M/uL    Hemoglobin 10.4 (L) 12.0 - 16.0 g/dL    Hematocrit 32.2 (L) 37.0 - 47.0 %    MCV 89.0 81.4 - 97.8 fL    MCH 28.7 27.0 - 33.0 pg    MCHC 32.3 (L) 33.6 - 35.0 g/dL    RDW 53.2 (H) 35.9 - 50.0 fL    Platelet Count 185 164 - 446 K/uL    MPV 11.8 9.0 - 12.9 fL    Neutrophils-Polys 73.90 (H) 44.00 - 72.00 %    Lymphocytes 16.70 (L) 22.00 - 41.00 %    Monocytes 8.10 0.00 - 13.40 %    Eosinophils 0.20 0.00 - 6.90 %    Basophils 0.30 0.00 - 1.80 %    Immature Granulocytes 0.80 0.00 - 0.90 %    Nucleated RBC 0.00 /100 WBC    Neutrophils (Absolute) 8.73 (H) 2.00 - 7.15 K/uL    Lymphs (Absolute) 1.97 1.00 - 4.80 K/uL    Monos (Absolute) 0.96 (H) 0.00 - 0.85 K/uL    Eos (Absolute) 0.02 0.00 - 0.51 K/uL    Baso (Absolute) 0.03 0.00 - 0.12 K/uL    Immature Granulocytes (abs) 0.09 0.00 - 0.11 K/uL    NRBC (Absolute) 0.00 K/uL   Comp Metabolic Panel (CMP)   Result Value Ref Range    Sodium 134 (L) 135 - 145 mmol/L    Potassium 4.7 3.6 - 5.5 mmol/L    Chloride 107 96 - 112 mmol/L    Co2 20 20 - 33 mmol/L    Anion Gap 7.0 0.0 - 11.9    Glucose 89 65 - 99 mg/dL    Bun 9 8 - 22 mg/dL    Creatinine 0.62 0.50 - 1.40 mg/dL    Calcium 8.5 8.5 - 10.5 mg/dL    AST(SGOT) 27 12 - 45 U/L    ALT(SGPT) 15 2 - 50 U/L    Alkaline Phosphatase 49 30 - 99 U/L    Total Bilirubin 1.3 0.1 - 1.5 mg/dL    Albumin 3.8 3.2 - 4.9 g/dL    Total Protein 6.4 6.0 - 8.2 g/dL    Globulin 2.6 1.9 - 3.5 g/dL    A-G Ratio 1.5 g/dL   Hemoglobin A1c   Result Value Ref Range    Glycohemoglobin 5.2 0.0 - 5.6 %    Est Avg Glucose 103 mg/dL   Lipid Profile (Lipid Panel) Fasting   Result Value Ref Range    Cholesterol,Tot 102 100 - 199 mg/dL    Triglycerides 76 0 - 149 mg/dL    HDL 28 (A) >=40 mg/dL    LDL 59 <100 mg/dL   TSH   Result Value Ref Range    TSH 1.050 0.380 - 5.330 uIU/mL   CRP QUANTITIVE  (NON-CARDIAC)   Result Value Ref Range    Stat C-Reactive Protein 19.56 (H) 0.00 - 0.75 mg/dL   ESTIMATED GFR   Result Value Ref Range    GFR If African American >60 >60 mL/min/1.73 m 2    GFR If Non African American >60 >60 mL/min/1.73 m 2   TROPONIN   Result Value Ref Range    Troponin I <0.01 0.00 - 0.04 ng/mL   EKG (ER)   Result Value Ref Range    Report       [facility]    Test Date:  2018  Pt Name:    TIFFANY VYAS                  Department: ER  MRN:        [id number]                      Room:       T2  Gender:     Female                       Technician: [id number]  :        1995                   Requested By:ER TRIAGE PROTOCOL  Order #:    [id number]                    Reading MD: Wm Aleman MD    Measurements  Intervals                                Axis  Rate:       119                          P:          40  AR:         132                          QRS:        38  QRSD:       64                           T:          -10  QT:         292  QTc:        411    Interpretive Statements  SINUS TACHYCARDIA  BORDERLINE T ABNORMALITIES, DIFFUSE LEADS  no stemi  No previous ECG available for comparison    Electronically Signed On 2018 15:06:32 PDT by Wm Aleman MD     EKG in four (4) hours   Result Value Ref Range    Report       [facility]    Test Date:  2018  Pt Name:    TIFFANY VYAS                  Department: ER  MRN:        [id number]                      Room:       T204  Gender:     Female                       Technician: SRS  :        1995                   Requested By:JAVIER NEGRO  Order #:    [id number]                    Reading MD:    Measurements  Intervals                                Axis  Rate:       105                          P:          46  AR:         128                          QRS:        28  QRSD:       64                           T:          -17  QT:         296  QTc:        392    Interpretive  Statements  SINUS TACHYCARDIA  BORDERLINE T ABNORMALITIES, INFERIOR LEADS  Compared to ECG 07/21/2018 20:36:26  No significant changes          RADIOLOGY  ECHOCARDIOGRAM COMP W/O CONT         CT-CTA CHEST PULMONARY ARTERY W/ RECONS   Final Result      1.  No evidence of pulmonary emboli.   2.  Pericardial effusion.   3.  Small bilateral pleural effusions and minimal bilateral lower lobe atelectasis.            DX-CHEST-PORTABLE (1 VIEW)   Final Result      No acute cardiopulmonary abnormality.        COURSE & MEDICAL DECISION MAKING    This is a 22 y.o. female who presents with chest pain, dyspnea, recent pericardial and pleural effusions at [facility].    Differential Diagnosis includes but is not limited to:  Pericarditis, myocarditis, volume overload, ACS, PE.    ED Course:  9:24 PM Patient seen and examined at bedside. Ordered for CT-CTA chest pulmonary artery, DX chest, urine microscopic, estimated GFR, D dimer, PT/INR, PTT, CBC, CMP, lipase, beta-HCG qualitative serum, urinalysis, troponin, BNP, EKG to evaluate. Patient will be treated with omnipaque,  mg for her symptoms.  The patient will be resuscitated with IV fluids due to clinical dehydration indicated by dry mucus membranes.    EKG no stemi. Trop neg doubt acs or myocarditis. Dimer elevated, will obtain cta. Wbc slightly elevated.     CT chest with pericardial and small pleural effusions, but no PE.     11:24 PM Patient reevaluated at bedside. Updated patient on lab and imaging results as seen above. Discussed further plan of care which includes admission for further evaluation and care. Patient understands and agrees. She's still tachycardic but slightly improving and feeling better after fluids and aspirin, thus I feel she's having a positive response to IV fluids rehydration.      11:40 PM - I discussed the patient's case and the above findings with Dr. Hutchinson (hospitalist) who will kindly admit the patient.  She needs further  resuscitation and echocardiogram and possible cardiology consult urgently. No evidence of tamponade, stable for admit to the floor in guarded condition.     Medications   aspirin (ASA) tablet 325 mg (325 mg Oral Given 7/22/18 0555)     Or   aspirin (ASA) chewable tab 324 mg ( Oral See Alternative 7/22/18 0555)     Or   aspirin (ASA) suppository 300 mg ( Rectal See Alternative 7/22/18 0555)   senna-docusate (PERICOLACE or SENOKOT S) 8.6-50 MG per tablet 2 Tab (2 Tabs Oral Refused 7/22/18 0600)     And   polyethylene glycol/lytes (MIRALAX) PACKET 1 Packet (not administered)     And   magnesium hydroxide (MILK OF MAGNESIA) suspension 30 mL (not administered)     And   bisacodyl (DULCOLAX) suppository 10 mg (not administered)   Respiratory Care per Protocol (not administered)   heparin injection 5,000 Units (5,000 Units Subcutaneous Given 7/22/18 1400)   ondansetron (ZOFRAN) syringe/vial injection 4 mg (4 mg Intravenous Given 7/22/18 1004)   ondansetron (ZOFRAN ODT) dispertab 4 mg (not administered)   promethazine (PHENERGAN) tablet 12.5-25 mg (not administered)   promethazine (PHENERGAN) suppository 12.5-25 mg (not administered)   prochlorperazine (COMPAZINE) injection 5-10 mg (not administered)   Pharmacy Consult Request ...Pain Management Review (not administered)     And   oxyCODONE immediate-release (ROXICODONE) tablet 5 mg (5 mg Oral Given 7/22/18 0351)     And   oxyCODONE immediate-release (ROXICODONE) tablet 10 mg (10 mg Oral Given 7/22/18 1401)     And   HYDROmorphone (DILAUDID) injection 0.5 mg (not administered)   predniSONE (DELTASONE) tablet 40 mg (40 mg Oral Given 7/22/18 0556)   colchicine (COLCRYS) tablet 0.6 mg (0.6 mg Oral Given 7/22/18 1140)   aspirin (ASA) chewable tab 324 mg (324 mg Oral Given 7/21/18 2154)   NS infusion 1,000 mL (0 mL Intravenous Stopped 7/21/18 2255)   iohexol (OMNIPAQUE) 350 mg/mL (50 mL Intravenous Given 7/21/18 3671)   NS (BOLUS) infusion 500 mL (500 mL Intravenous Given  7/22/18 0003)     FINAL IMPRESSION  1. Pericardial effusion    2. Acute chest pain    3. Dehydration    4. Tachycardia    5. Leukocytosis, unspecified type    6. Elevated d-dimer      -ADMIT-    Pertinent Labs & Imaging studies reviewed and verified by myself, as well as nursing notes and the patient's past medical, family, and social histories (See chart for details).    Results, exam findings, clinical impression, presumed diagnosis, treatment options, and plan to admit were discussed with the patient and family, and they verbalized understanding, agreed with, and appreciated the plan of care.    Reddy RAM (Mukeshibcarmen), am scribing for, and in the presence of, Wm Aleman M.D..    Electronically signed by: Reddy Hill (Moisés), 7/21/2018    Wm RAM M.D. personally performed the services described in this documentation, as scribed by Reddy Hill in my presence, and it is both accurate and complete.    Portions of this record were made with voice recognition software.  Despite my review, spelling/grammar/context errors may still remain.  Interpretation of this chart should be taken in this context.    The note accurately reflects work and decisions made by me.  Wm Aleman  7/22/2018  3:08 PM

## 2018-07-22 NOTE — H&P
Hospital Medicine History & Physical Note    Date of Service  7/21/2018    Primary Care Physician  Dottie Roberson M.D.    Consultants  none    Code Status  full    Chief Complaint  Chest pain    History of Presenting Illness  22 y.o. female who presented 7/21/2018 with chest pain.  This patient developed chest pain yesterday radiating to her neck.  She has had difficulty with lying flat and leaning forward.  She feels like someone sitting on her chest.  Sharp in quality with deep breaths and certain movements.  She also has some shortness of breath sweats nausea and chills.  She has decreased appetite for the last several days with nausea.  She has a diagnosis of pericardial effusion in May 2018 as well as pleural effusion which she had a thoracentesis and diagnosed with pericarditis started on prednisone.  She supposed to finish the course of prednisone tomorrow she is on 10 mg daily currently.  She had a cardiology appointment at [place] which she was told that her pericardial effusion has improved and is resolved.  She has no known alleviating factors to her symptoms.      Review of Systems  Review of Systems   Constitutional: Negative for chills and fever.   HENT: Negative for congestion, hearing loss and tinnitus.    Eyes: Negative for blurred vision, double vision and discharge.   Respiratory: Positive for shortness of breath. Negative for cough and hemoptysis.    Cardiovascular: Positive for chest pain. Negative for palpitations and leg swelling.   Gastrointestinal: Negative for abdominal pain, heartburn, nausea and vomiting.   Genitourinary: Negative for dysuria and flank pain.   Musculoskeletal: Negative for joint pain and myalgias.   Skin: Negative for rash.   Neurological: Positive for weakness. Negative for dizziness, sensory change, speech change and focal weakness.   Endo/Heme/Allergies: Negative for environmental allergies. Does not bruise/bleed easily.   Psychiatric/Behavioral: Negative for  depression, hallucinations and substance abuse.       Past Medical History   has a past medical history of Tonsillitis.    Surgical History   has a past surgical history that includes tonsillectomy (2/24/2010).     Family History  Reviewed and noncontributory    Social History   reports that she has never smoked. She does not have any smokeless tobacco history on file. She reports that she does not drink alcohol or use drugs.    Allergies  Allergies   Allergen Reactions   • Vicodin [Hydrocodone-Acetaminophen] Hives       Medications  Prior to Admission Medications   Prescriptions Last Dose Informant Patient Reported? Taking?   AMOXICILLIN 2/14/10  Yes No   Sig: by Does not apply route.   Phenol-Phenolate Sodium (THROAT SPRAY MT) 22/22/10  Yes No   Sig: Spray  in mouth/throat.   TYLENOL 8 HOUR PO   Yes No   Sig: Take  by mouth.   lidocaine viscous 2% (XYLOCAINE) 2 % Solution   No No   Sig: Take 15 mL by mouth as needed for Throat/Mouth Pain.   ondansetron (ZOFRAN ODT) 4 MG TABLET DISPERSIBLE   No No   Sig: Take 1 Tab by mouth every 8 hours as needed for Nausea/Vomiting.      Facility-Administered Medications: None       Physical Exam  Blood Pressure: 119/71   Temperature: 37.1 °C (98.8 °F)   Pulse: (!) 114   Respiration: 16   Pulse Oximetry: 98 %     Physical Exam   Constitutional: She is oriented to person, place, and time. She appears well-developed and well-nourished. She appears distressed.   HENT:   Head: Normocephalic and atraumatic.   Eyes: Conjunctivae and EOM are normal. Pupils are equal, round, and reactive to light.   Neck: Normal range of motion. Neck supple. No JVD present.   Cardiovascular: Regular rhythm, normal heart sounds and intact distal pulses.    No murmur heard.  tachycardic   Pulmonary/Chest: Effort normal and breath sounds normal. No respiratory distress. She has no wheezes.   Abdominal: Soft. Bowel sounds are normal. She exhibits no distension. There is no tenderness.   Musculoskeletal:  Normal range of motion. She exhibits no edema.   Neurological: She is alert and oriented to person, place, and time. She exhibits normal muscle tone.   Skin: Skin is warm and dry.   Psychiatric: She has a normal mood and affect. Her behavior is normal. Judgment and thought content normal.   Nursing note and vitals reviewed.      Laboratory:  Recent Labs      07/21/18 2141   WBC  15.8*   RBC  4.20   HEMOGLOBIN  12.1   HEMATOCRIT  35.9*   MCV  85.5   MCH  28.8   MCHC  33.7   RDW  49.3   PLATELETCT  248   MPV  10.8     Recent Labs      07/21/18 2141   SODIUM  135   POTASSIUM  3.7   CHLORIDE  103   CO2  22   GLUCOSE  103*   BUN  10   CREATININE  0.61   CALCIUM  9.5     Recent Labs      07/21/18 2141   ALTSGPT  19   ASTSGOT  14   ALKPHOSPHAT  60   TBILIRUBIN  1.4   LIPASE  5*   GLUCOSE  103*     Recent Labs      07/21/18 2141   APTT  31.0   INR  1.26*     Recent Labs      07/21/18 2141   BNPBTYPENAT  38         Lab Results   Component Value Date    TROPONINI <0.01 07/21/2018       Urinalysis:    Lab Results   Component Value Date    SPECGRAVITY 1.030 07/21/2018    GLUCOSEUR Negative 07/21/2018    KETONES 80 (A) 07/21/2018    NITRITE Negative 07/21/2018    WBCURINE 2-5 07/21/2018    RBCURINE 0-2 07/21/2018    BACTERIA Few (A) 07/21/2018    EPITHELCELL Few 07/21/2018        Imaging:  CT-CTA CHEST PULMONARY ARTERY W/ RECONS   Final Result      1.  No evidence of pulmonary emboli.   2.  Pericardial effusion.   3.  Small bilateral pleural effusions and minimal bilateral lower lobe atelectasis.            DX-CHEST-PORTABLE (1 VIEW)   Final Result      No acute cardiopulmonary abnormality.      ECHOCARDIOGRAM COMP W/O CONT    (Results Pending)         Assessment/Plan:  I anticipate this patient is appropriate for observation status at this time.    * Pain in the chest   Assessment & Plan    This patient presents with chest pain with known pericarditis pain tapered off prednisone last dose tomorrow had echocardiogram  with resolution of pericarditis per cardiology from [place].   I will trend troponin  Check ESR CRP  Restart prednisone 40 mg daily assess response ; check echocardiogram          Pleural effusion   Assessment & Plan    Small bilateral on ct cont to monitor  resp care        Leukocytosis   Assessment & Plan    This is chronic leukocytosis from chronic steroid use this is not sepsis        Tachycardia   Assessment & Plan    Small bolus of IV fluids and assess response        Dehydration   Assessment & Plan    With tachycardia and ketonuria  We will give IV fluid encourage p.o. intake        Nausea   Assessment & Plan    Antiemetics            VTE prophylaxis: heparin

## 2018-07-22 NOTE — ED NOTES
Pt medicated per request and c/o pain.  Pt aware new order for colchicine and awaiting arrival from pharmacy.  Pt's friend bedside.

## 2018-07-22 NOTE — PROGRESS NOTES
Pt into unit from ED via wheelchair transported by CDU RN on tele monitor. Weight and VS taken. Patient oriented to unit, room and BR location. Assisted to restroom for toileting. Weight and VS taken. Attached to cardiac monitoring. Admit profile and initial assessment done. Reports chest pain w/ movt and exertion denies SOB but dyspnea when laying supine. Plan of care discussed with pt, verbalizes understanding. Safety measures and fall precautions in place. Medicated per MAR. Encouraged to verbalize needs. Call light within reach. Will continue to assess and monitor

## 2018-07-22 NOTE — PROGRESS NOTES
Pt vomited approx 500cc clear fluid.  Pt reports recently drank water and juice.  Pt medicated for nausea.  Up to the sink to brush teeth.

## 2018-07-22 NOTE — ED TRIAGE NOTES
"Theodora Ponce  22 y.o. female  Chief Complaint   Patient presents with   • Chest Pain     Pt. states centralized chest pain that gets worse when she lays down or sits up or with exacerbation. Pt. states recent dx of percardial effusion and pleural effusion back in May at [place]. Pt. states SOB with chest pain. Pt. describes the pain as a pressure.     /71   Pulse (!) 130   Temp 37.1 °C (98.8 °F) (Temporal)   Resp 16   Ht 1.6 m (5' 3\")   Wt 80.7 kg (177 lb 14.6 oz)   SpO2 98%   BMI 31.52 kg/m²     Pt amb to triage with steady gait for above complaint. Charge RN notified of this pt.  Pt is alert and oriented, speaking in full sentences, follows commands and responds appropriately to questions. NAD. Resp are even and unlabored.  Pt placed in lobby. Pt educated on triage process. Pt encouraged to alert staff for any changes.  "

## 2018-07-22 NOTE — CARE PLAN
Problem: Pain Management  Goal: Pain level will decrease to patient's comfort goal  Outcome: PROGRESSING SLOWER THAN EXPECTED  Interventions: Assess patient's pain level. Implement non-pharmacologic and pharmacologic pain measures (rest,relaxation,distraction) and pharmacologic pain measures (PRN meds as scheduled)    Problem: Knowledge Deficit  Goal: Knowledge of disease process/condition, treatment plan, diagnostic tests, and medications will improve  Outcome: PROGRESSING AS EXPECTED  Interventions: Assess pt's knowledge of condition, treatment plan, diagnostic tests and medications. Explain information and update on plan of care. Allow for questions and encourage to verbalize concerns.

## 2018-07-22 NOTE — PROGRESS NOTES
Bedside report rec'd, patient care assumed at this time.  Patient resting, c/o increasing CP.  Medicated per MAR.  Updated on POC.  All needs met.

## 2018-07-22 NOTE — ASSESSMENT & PLAN NOTE
This patient presents with chest pain with known pericarditis pain tapered off prednisone last dose tomorrow had echocardiogram with resolution of pericarditis per cardiology from [place].   I will trend troponin  Check ESR CRP  Restart prednisone 40 mg daily assess response ; check echocardiogram

## 2018-07-23 LAB — EKG IMPRESSION: NORMAL

## 2018-07-23 NOTE — DISCHARGE INSTRUCTIONS
Discharge Instructions    Discharged to home by car with relative. Discharged via wheelchair, hospital escort: Refused.  Special equipment needed: Not Applicable    Be sure to schedule a follow-up appointment with your primary care doctor or any specialists as instructed.     Discharge Plan:   Diet Plan: Discussed  Activity Level: Discussed  Confirmed Follow up Appointment: Appointment Scheduled  Confirmed Symptoms Management: Discussed  Medication Reconciliation Updated: Yes  Influenza Vaccine Indication: Not indicated: Previously immunized this influenza season and > 8 years of age    I understand that a diet low in cholesterol, fat, and sodium is recommended for good health. Unless I have been given specific instructions below for another diet, I accept this instruction as my diet prescription.   Other diet: advance diet as tolerated to regular.     Special Instructions: MAKE SURE YOU FOLLOW UP WITH PRIMARY DOCTOR AND CARDIOLOGY.     · Is patient discharged on Warfarin / Coumadin?   No     Depression / Suicide Risk    As you are discharged from this [facility] facility, it is important to learn how to keep safe from harming yourself.    Recognize the warning signs:  · Abrupt changes in personality, positive or negative- including increase in energy   · Giving away possessions  · Change in eating patterns- significant weight changes-  positive or negative  · Change in sleeping patterns- unable to sleep or sleeping all the time   · Unwillingness or inability to communicate  · Depression  · Unusual sadness, discouragement and loneliness  · Talk of wanting to die  · Neglect of personal appearance   · Rebelliousness- reckless behavior  · Withdrawal from people/activities they love  · Confusion- inability to concentrate     If you or a loved one observes any of these behaviors or has concerns about self-harm, here's what you can do:  · Talk about it- your feelings and reasons for harming yourself  · Remove any means  that you might use to hurt yourself (examples: pills, rope, extension cords, firearm)  · Get professional help from the community (Mental Health, Substance Abuse, psychological counseling)  · Do not be alone:Call your Safe Contact- someone whom you trust who will be there for you.  · Call your local CRISIS HOTLINE 232-4220 or 651-517-8035  · Call your local Children's Mobile Crisis Response Team [place] (213) 607-9468 or www.[employer]  · Call the toll free National Suicide Prevention Hotlines   · National Suicide Prevention Lifeline 001-171-DDZG (2363)  · National Hope Line Network 800-SUICIDE (175-4792)

## 2018-07-23 NOTE — PROGRESS NOTES
Discharging patient home per Physician order.  Discharged with family.  Demonstrated understanding of discharge instructions, follow up appointments, home medications and new prescriptions. Ambulating with steady gait, voiding without difficulty, pain well controlled, tolerating oral medications, oxygen saturation greater than 90% , tolerating diet.   Follow up appointments discussed. Scripts for oxycodone given and the rest of scripts sent to [facility] pharmacy.  All questions answered. Verbalized understanding and agrees. Belongings with patient at time of discharge.

## 2018-07-23 NOTE — DISCHARGE SUMMARY
Hospital Medicine Discharge Note     Patient ID:  Theodora Ponce  [id number]  22 y.o.female  1995    Admit Date:  7/21/2018       Discharge Date:  7/22/2018    Primary Care Provider: Dottie Roberson M.D.    Admitting Physician: Irvin Hutchinson M.D.  Discharging Physician: JEM Alonso/Fabiano Yanes MD    Chief Complaint: Chest pain    Discharge Diagnoses:   Principal Problem:    Pain in the chest, secondary to pericarditis  Active Problems:    Pericarditis    Pericardial effusion    Pleural effusion    Leukocytosis    Chronic Medical Problems:  Past Medical History:   Diagnosis Date   • Pericarditis 05/2018   • Tonsillitis      Code Status: Full Code    Hospital Summary:       Please refer to H&P by Dr. Hutchinson on 7/21/2018 for full details.      In brief, Theodora Ponce is a 22 y.o. female who was admitted 7/21/2018 for pleuritic and exertional chest pain associated with nausea.  In May she was in [facility] for both pericardial and pleural effusions after having her appendix and gallbladder removed, for which she was placed on steroids for pericarditis and underwent thoracentesis for her pleural effusions.  Prior to arrival she noticed an increase in her pain and subsequently presented to the [facility] ED for evaluation.  CRP and ESR were elevated.  CTA was negative for pulmonary emboli but did show a pericardial effusion in addition to small bilateral pleural effusions. Her chest xray was unimpressive.  Trops were negative, UA showed no infection, and her WBC was elevated but has improved to nearly normal now.  Steroids were continued and colchicine was added, in addition to prn ibuprofen. Prilosec was added for GI protection.  She did require some doses of oxycodone, which she stated helped her sleep.  An echocardiogram was done and showed a small pericardial effusion without evidence of hemodynamic compromise.  Her chest discomfort has largely improved and her vital signs have  remained stable. She was extensively educated on the signs and symptoms of worsening pericardial effusion and when to seek treatment.  An incentive spirometer was also provided to her with instructions on proper use. She was advised to follow closely with her PCP and cardiology after discharge, which she has agreed to do. I have also asked her to remain out of work for the next 2 weeks while she recovers and a work note was provided.    Therefore, she is discharged in fair and stable condition with close outpatient follow-up.    Consultants:      None    Studies:  Imaging/ Testing:      ECHOCARDIOGRAM COMP W/O CONT   Final Result      CT-CTA CHEST PULMONARY ARTERY W/ RECONS   Final Result      1.  No evidence of pulmonary emboli.   2.  Pericardial effusion.   3.  Small bilateral pleural effusions and minimal bilateral lower lobe atelectasis.            DX-CHEST-PORTABLE (1 VIEW)   Final Result      No acute cardiopulmonary abnormality.        Laboratory:   Recent Labs      07/21/18 2141 07/22/18   0355   WBC  15.8*  11.8*   RBC  4.20  3.62*   HEMOGLOBIN  12.1  10.4*   HEMATOCRIT  35.9*  32.2*   MCV  85.5  89.0   MCH  28.8  28.7   MCHC  33.7  32.3*   RDW  49.3  53.2*   PLATELETCT  248  185   MPV  10.8  11.8     Recent Labs      07/21/18 2141 07/22/18   0355   SODIUM  135  134*   POTASSIUM  3.7  4.7   CHLORIDE  103  107   CO2  22  20   GLUCOSE  103*  89   BUN  10  9   CREATININE  0.61  0.62   CALCIUM  9.5  8.5     Recent Labs      07/21/18 2141 07/22/18   0355   ALTSGPT  19  15   ASTSGOT  14  27   ALKPHOSPHAT  60  49   TBILIRUBIN  1.4  1.3   LIPASE  5*   --    GLUCOSE  103*  89      Recent Labs      07/21/18 2141   BNPBTYPENAT  38     Recent Labs      07/22/18   0355   TRIGLYCERIDE  76   HDL  28*   LDL  59     Recent Labs      07/21/18 2141 07/22/18   0355  07/22/18   0810   TROPONINI  <0.01  <0.01  <0.01     Recent Labs      07/21/18 2141   TSHULTRASEN  1.050     Results     Procedure Component Value  Units Date/Time    URINALYSIS,CULTURE IF INDICATED [[id number]]  (Abnormal) Collected:  07/21/18 2141    Order Status:  Completed Specimen:  Blood Updated:  07/21/18 2159     Color DK Yellow     Character Clear     Specific Gravity 1.030     Ph 5.5     Glucose Negative mg/dL      Ketones 80 (A) mg/dL      Protein 100 (A) mg/dL      Bilirubin Negative     Urobilinogen, Urine 1.0     Nitrite Negative     Leukocyte Esterase Negative     Occult Blood Negative     Micro Urine Req Microscopic        Procedures/Surgeries:        None    Disposition:  Discharge home    Condition:  Stable    Instructions:   Activity: Light duty. Off work until 8/6/18 then light duty until symptoms subside (work note provided).  Diet: Regular  Followup: PCP & cardiology in 1 week  Instructions:  -Given instructions to return to the ER if any new or worsening symptoms, worsening condition, or failure to improve  -Call PCP for followup  -No smoking, no alcohol, no caffeine  -Encourage risk factor reduction with tobacco and alcohol abstinence, diet changes, weight loss, and exercise.     Follow-Up  Dottie Roberson M.D.  [address] [id number] [phone]      Please call to schedule your appointment. Thank you     [facility]  [phone]    Please call to schedule your appointment. Thank you     Discharge Medications:        (YES)  Medication Reconciliation Completed     Medication List      CHANGE how you take these medications      Instructions   ondansetron 4 MG Tbdp  What changed:  · when to take this  · reasons to take this  Commonly known as:  ZOFRAN ODT   Take 1 Tab by mouth every 6 hours as needed for Nausea.  Dose:  4 mg     oxyCODONE immediate-release 5 MG Tabs  What changed:  reasons to take this  Commonly known as:  ROXICODONE   Take 1 Tab by mouth every 6 hours as needed for Severe Pain for up to 2 days.  Dose:  5 mg     predniSONE 20 MG Tabs  What changed:  · medication strength  · how much to  take  Commonly known as:  DELTASONE   Take 1 Tab by mouth every day for 4 days.  Dose:  20 mg        CONTINUE taking these medications      Instructions   colchicine 0.6 MG Tabs  Commonly known as:  COLCRYS   Take 0.6 mg by mouth 2 times a day.  Dose:  0.6 mg     lidocaine viscous 2% 2 % Soln  Commonly known as:  XYLOCAINE   Take 15 mL by mouth as needed for Throat/Mouth Pain.  Dose:  15 mL     omeprazole 20 MG delayed-release capsule  Commonly known as:  PRILOSEC   Take 20 mg by mouth every day.  Dose:  20 mg     THROAT SPRAY MT   Spray  in mouth/throat.     TYLENOL 8 HOUR PO   Take  by mouth.        STOP taking these medications    AMOXICILLIN          Opioid prescription history checked: YES    Total time of the discharge process exceeds 45 minutes. This included face to face with the patient, medication reconciliation, care coordination with Dr. Yanes involved in patient care and discussion and coordination with case management.     Please CC the above physicians    TOMMY Rhoades  7/22/2018  7:13 PM

## 2018-08-07 ENCOUNTER — HOSPITAL ENCOUNTER (INPATIENT)
Dept: HOSPITAL 8 - ED | Age: 23
LOS: 1 days | Discharge: HOME | DRG: 101 | End: 2018-08-08
Attending: FAMILY MEDICINE | Admitting: FAMILY MEDICINE
Payer: COMMERCIAL

## 2018-08-07 VITALS — BODY MASS INDEX: 33.52 KG/M2 | HEIGHT: 63 IN | WEIGHT: 189.16 LBS

## 2018-08-07 VITALS — DIASTOLIC BLOOD PRESSURE: 64 MMHG | SYSTOLIC BLOOD PRESSURE: 108 MMHG

## 2018-08-07 VITALS — SYSTOLIC BLOOD PRESSURE: 99 MMHG | DIASTOLIC BLOOD PRESSURE: 63 MMHG

## 2018-08-07 VITALS — SYSTOLIC BLOOD PRESSURE: 95 MMHG | DIASTOLIC BLOOD PRESSURE: 60 MMHG

## 2018-08-07 VITALS — SYSTOLIC BLOOD PRESSURE: 99 MMHG | DIASTOLIC BLOOD PRESSURE: 57 MMHG

## 2018-08-07 DIAGNOSIS — D72.829: ICD-10-CM

## 2018-08-07 DIAGNOSIS — I31.3: ICD-10-CM

## 2018-08-07 DIAGNOSIS — Z90.49: ICD-10-CM

## 2018-08-07 DIAGNOSIS — G40.89: Primary | ICD-10-CM

## 2018-08-07 DIAGNOSIS — Z88.1: ICD-10-CM

## 2018-08-07 DIAGNOSIS — Z88.5: ICD-10-CM

## 2018-08-07 DIAGNOSIS — R00.0: ICD-10-CM

## 2018-08-07 DIAGNOSIS — J90: ICD-10-CM

## 2018-08-07 LAB
ALBUMIN SERPL-MCNC: 4 G/DL (ref 3.4–5)
ANION GAP SERPL CALC-SCNC: 9 MMOL/L (ref 5–15)
BASOPHILS # BLD AUTO: 0.06 X10^3/UL (ref 0–0.1)
BASOPHILS NFR BLD AUTO: 0 % (ref 0–1)
CALCIUM SERPL-MCNC: 9.2 MG/DL (ref 8.5–10.1)
CHLORIDE SERPL-SCNC: 106 MMOL/L (ref 98–107)
CK SERPL-CCNC: 147 U/L (ref 26–192)
CREAT SERPL-MCNC: 0.92 MG/DL (ref 0.55–1.02)
EOSINOPHIL # BLD AUTO: 0.12 X10^3/UL (ref 0–0.4)
EOSINOPHIL NFR BLD AUTO: 1 % (ref 1–7)
ERYTHROCYTE [DISTWIDTH] IN BLOOD BY AUTOMATED COUNT: 16.5 % (ref 9.6–15.2)
ERYTHROCYTE [SEDIMENTATION RATE] IN BLOOD BY WESTERGREN METHOD: 11 MM/HR (ref 0–20)
HCT (SEDRATE): 39.8 % (ref 34.6–47.8)
LYMPHOCYTES # BLD AUTO: 4.89 X10^3/UL (ref 1–3.4)
LYMPHOCYTES NFR BLD AUTO: 36 % (ref 22–44)
MCH RBC QN AUTO: 28.2 PG (ref 27–34.8)
MCHC RBC AUTO-ENTMCNC: 33.5 G/DL (ref 32.4–35.8)
MCV RBC AUTO: 84.1 FL (ref 80–100)
MD: NO
MONOCYTES # BLD AUTO: 1.17 X10^3/UL (ref 0.2–0.8)
MONOCYTES NFR BLD AUTO: 9 % (ref 2–9)
NEUTROPHILS # BLD AUTO: 7.25 X10^3/UL (ref 1.8–6.8)
NEUTROPHILS NFR BLD AUTO: 54 % (ref 42–75)
PLATELET # BLD AUTO: 335 X10^3/UL (ref 130–400)
PMV BLD AUTO: 9.2 FL (ref 7.4–10.4)
RBC # BLD AUTO: 4.91 X10^6/UL (ref 3.82–5.3)
T4 FREE SERPL-MCNC: 1.18 NG/DL (ref 0.76–1.46)
TSH SERPL-ACNC: 1.94 MIU/L (ref 0.36–3.74)

## 2018-08-07 PROCEDURE — 95819 EEG AWAKE AND ASLEEP: CPT

## 2018-08-07 PROCEDURE — 70450 CT HEAD/BRAIN W/O DYE: CPT

## 2018-08-07 PROCEDURE — 71045 X-RAY EXAM CHEST 1 VIEW: CPT

## 2018-08-07 PROCEDURE — 82040 ASSAY OF SERUM ALBUMIN: CPT

## 2018-08-07 PROCEDURE — 84703 CHORIONIC GONADOTROPIN ASSAY: CPT

## 2018-08-07 PROCEDURE — A9585 GADOBUTROL INJECTION: HCPCS

## 2018-08-07 PROCEDURE — 70553 MRI BRAIN STEM W/O & W/DYE: CPT

## 2018-08-07 PROCEDURE — 80053 COMPREHEN METABOLIC PANEL: CPT

## 2018-08-07 PROCEDURE — 80307 DRUG TEST PRSMV CHEM ANLYZR: CPT

## 2018-08-07 PROCEDURE — 84443 ASSAY THYROID STIM HORMONE: CPT

## 2018-08-07 PROCEDURE — 96365 THER/PROPH/DIAG IV INF INIT: CPT

## 2018-08-07 PROCEDURE — 86038 ANTINUCLEAR ANTIBODIES: CPT

## 2018-08-07 PROCEDURE — 99291 CRITICAL CARE FIRST HOUR: CPT

## 2018-08-07 PROCEDURE — 84145 PROCALCITONIN (PCT): CPT

## 2018-08-07 PROCEDURE — 80048 BASIC METABOLIC PNL TOTAL CA: CPT

## 2018-08-07 PROCEDURE — 96375 TX/PRO/DX INJ NEW DRUG ADDON: CPT

## 2018-08-07 PROCEDURE — 83735 ASSAY OF MAGNESIUM: CPT

## 2018-08-07 PROCEDURE — 85025 COMPLETE CBC W/AUTO DIFF WBC: CPT

## 2018-08-07 PROCEDURE — 84100 ASSAY OF PHOSPHORUS: CPT

## 2018-08-07 PROCEDURE — 93005 ELECTROCARDIOGRAM TRACING: CPT

## 2018-08-07 PROCEDURE — 36415 COLL VENOUS BLD VENIPUNCTURE: CPT

## 2018-08-07 PROCEDURE — 85651 RBC SED RATE NONAUTOMATED: CPT

## 2018-08-07 PROCEDURE — 82550 ASSAY OF CK (CPK): CPT

## 2018-08-07 PROCEDURE — 84439 ASSAY OF FREE THYROXINE: CPT

## 2018-08-07 PROCEDURE — 86140 C-REACTIVE PROTEIN: CPT

## 2018-08-07 RX ADMIN — DEXTROSE AND SODIUM CHLORIDE SCH MLS/HR: 5; .45 INJECTION, SOLUTION INTRAVENOUS at 17:35

## 2018-08-07 RX ADMIN — COLCHICINE SCH MG: 0.6 TABLET, FILM COATED ORAL at 21:36

## 2018-08-07 RX ADMIN — DEXTROSE AND SODIUM CHLORIDE SCH MLS/HR: 5; .45 INJECTION, SOLUTION INTRAVENOUS at 10:11

## 2018-08-07 RX ADMIN — LEVETIRACETAM SCH MLS/HR: 100 INJECTION, SOLUTION, CONCENTRATE INTRAVENOUS at 21:36

## 2018-08-07 RX ADMIN — LEVETIRACETAM SCH MLS/HR: 100 INJECTION, SOLUTION, CONCENTRATE INTRAVENOUS at 10:11

## 2018-08-08 VITALS — SYSTOLIC BLOOD PRESSURE: 110 MMHG | DIASTOLIC BLOOD PRESSURE: 74 MMHG

## 2018-08-08 VITALS — DIASTOLIC BLOOD PRESSURE: 66 MMHG | SYSTOLIC BLOOD PRESSURE: 103 MMHG

## 2018-08-08 VITALS — DIASTOLIC BLOOD PRESSURE: 64 MMHG | SYSTOLIC BLOOD PRESSURE: 102 MMHG

## 2018-08-08 LAB
ALBUMIN SERPL-MCNC: 3.4 G/DL (ref 3.4–5)
ALP SERPL-CCNC: 52 U/L (ref 45–117)
ALT SERPL-CCNC: 46 U/L (ref 12–78)
ANION GAP SERPL CALC-SCNC: 8 MMOL/L (ref 5–15)
BASOPHILS # BLD AUTO: 0.04 X10^3/UL (ref 0–0.1)
BASOPHILS NFR BLD AUTO: 0 % (ref 0–1)
BILIRUB SERPL-MCNC: 0.7 MG/DL (ref 0.2–1)
CALCIUM SERPL-MCNC: 8.7 MG/DL (ref 8.5–10.1)
CHLORIDE SERPL-SCNC: 108 MMOL/L (ref 98–107)
CREAT SERPL-MCNC: 0.69 MG/DL (ref 0.55–1.02)
EOSINOPHIL # BLD AUTO: 0.07 X10^3/UL (ref 0–0.4)
EOSINOPHIL NFR BLD AUTO: 1 % (ref 1–7)
ERYTHROCYTE [DISTWIDTH] IN BLOOD BY AUTOMATED COUNT: 16.7 % (ref 9.6–15.2)
LYMPHOCYTES # BLD AUTO: 2.42 X10^3/UL (ref 1–3.4)
LYMPHOCYTES NFR BLD AUTO: 19 % (ref 22–44)
MCH RBC QN AUTO: 28.3 PG (ref 27–34.8)
MCHC RBC AUTO-ENTMCNC: 33.5 G/DL (ref 32.4–35.8)
MCV RBC AUTO: 84.5 FL (ref 80–100)
MD: NO
MONOCYTES # BLD AUTO: 0.95 X10^3/UL (ref 0.2–0.8)
MONOCYTES NFR BLD AUTO: 8 % (ref 2–9)
NEUTROPHILS # BLD AUTO: 9 X10^3/UL (ref 1.8–6.8)
NEUTROPHILS NFR BLD AUTO: 72 % (ref 42–75)
PLATELET # BLD AUTO: 254 X10^3/UL (ref 130–400)
PMV BLD AUTO: 9.4 FL (ref 7.4–10.4)
PROT SERPL-MCNC: 6.3 G/DL (ref 6.4–8.2)
RBC # BLD AUTO: 4.48 X10^6/UL (ref 3.82–5.3)

## 2018-08-08 RX ADMIN — LEVETIRACETAM SCH MLS/HR: 100 INJECTION, SOLUTION, CONCENTRATE INTRAVENOUS at 09:29

## 2018-08-08 RX ADMIN — COLCHICINE SCH MG: 0.6 TABLET, FILM COATED ORAL at 09:29

## 2018-08-08 RX ADMIN — DEXTROSE AND SODIUM CHLORIDE SCH MLS/HR: 5; .45 INJECTION, SOLUTION INTRAVENOUS at 00:53

## 2018-08-08 RX ADMIN — DEXTROSE AND SODIUM CHLORIDE SCH MLS/HR: 5; .45 INJECTION, SOLUTION INTRAVENOUS at 13:32

## 2018-08-09 LAB — ANA SER QL: NEGATIVE

## 2018-08-10 NOTE — ADDENDUM NOTE
Encounter addended by: Fabiano Yanes M.D. on: 8/10/2018  8:20 AM<BR>    Actions taken: Edit attestation on clinical note

## 2018-09-12 ENCOUNTER — HOSPITAL ENCOUNTER (EMERGENCY)
Dept: HOSPITAL 8 - ED | Age: 23
Discharge: HOME | End: 2018-09-12
Payer: COMMERCIAL

## 2018-09-12 VITALS — HEIGHT: 63 IN | WEIGHT: 181.44 LBS | BODY MASS INDEX: 32.15 KG/M2

## 2018-09-12 VITALS — DIASTOLIC BLOOD PRESSURE: 78 MMHG | SYSTOLIC BLOOD PRESSURE: 132 MMHG

## 2018-09-12 DIAGNOSIS — Z90.49: ICD-10-CM

## 2018-09-12 DIAGNOSIS — I31.9: Primary | ICD-10-CM

## 2018-09-12 DIAGNOSIS — Z90.89: ICD-10-CM

## 2018-09-12 LAB
ALBUMIN SERPL-MCNC: 4 G/DL (ref 3.4–5)
ANION GAP SERPL CALC-SCNC: 8 MMOL/L (ref 5–15)
BASOPHILS # BLD AUTO: 0.03 X10^3/UL (ref 0–0.1)
BASOPHILS NFR BLD AUTO: 0 % (ref 0–1)
CALCIUM SERPL-MCNC: 9.3 MG/DL (ref 8.5–10.1)
CHLORIDE SERPL-SCNC: 106 MMOL/L (ref 98–107)
CREAT SERPL-MCNC: 0.85 MG/DL (ref 0.55–1.02)
EOSINOPHIL # BLD AUTO: 0 X10^3/UL (ref 0–0.4)
EOSINOPHIL NFR BLD AUTO: 0 % (ref 1–7)
ERYTHROCYTE [DISTWIDTH] IN BLOOD BY AUTOMATED COUNT: 15.3 % (ref 9.6–15.2)
LYMPHOCYTES # BLD AUTO: 1.64 X10^3/UL (ref 1–3.4)
LYMPHOCYTES NFR BLD AUTO: 11 % (ref 22–44)
MCH RBC QN AUTO: 29 PG (ref 27–34.8)
MCHC RBC AUTO-ENTMCNC: 34.5 G/DL (ref 32.4–35.8)
MCV RBC AUTO: 84.1 FL (ref 80–100)
MD: NO
MONOCYTES # BLD AUTO: 1.11 X10^3/UL (ref 0.2–0.8)
MONOCYTES NFR BLD AUTO: 8 % (ref 2–9)
NEUTROPHILS # BLD AUTO: 11.83 X10^3/UL (ref 1.8–6.8)
NEUTROPHILS NFR BLD AUTO: 81 % (ref 42–75)
PLATELET # BLD AUTO: 330 X10^3/UL (ref 130–400)
PMV BLD AUTO: 9.1 FL (ref 7.4–10.4)
RBC # BLD AUTO: 4.89 X10^6/UL (ref 3.82–5.3)
TROPONIN I SERPL-MCNC: < 0.015 NG/ML (ref 0–0.04)

## 2018-09-12 PROCEDURE — 80048 BASIC METABOLIC PNL TOTAL CA: CPT

## 2018-09-12 PROCEDURE — 71045 X-RAY EXAM CHEST 1 VIEW: CPT

## 2018-09-12 PROCEDURE — 84484 ASSAY OF TROPONIN QUANT: CPT

## 2018-09-12 PROCEDURE — 85025 COMPLETE CBC W/AUTO DIFF WBC: CPT

## 2018-09-12 PROCEDURE — 82040 ASSAY OF SERUM ALBUMIN: CPT

## 2018-09-12 PROCEDURE — 83880 ASSAY OF NATRIURETIC PEPTIDE: CPT

## 2018-09-12 PROCEDURE — 99285 EMERGENCY DEPT VISIT HI MDM: CPT

## 2018-09-12 PROCEDURE — 93005 ELECTROCARDIOGRAM TRACING: CPT

## 2018-09-12 PROCEDURE — 36415 COLL VENOUS BLD VENIPUNCTURE: CPT

## 2018-11-18 ENCOUNTER — HOSPITAL ENCOUNTER (EMERGENCY)
Dept: HOSPITAL 8 - ED | Age: 23
Discharge: HOME | End: 2018-11-18
Payer: COMMERCIAL

## 2018-11-18 VITALS — SYSTOLIC BLOOD PRESSURE: 134 MMHG | DIASTOLIC BLOOD PRESSURE: 82 MMHG

## 2018-11-18 VITALS — BODY MASS INDEX: 31.25 KG/M2 | WEIGHT: 176.37 LBS | HEIGHT: 63 IN

## 2018-11-18 DIAGNOSIS — R56.9: Primary | ICD-10-CM

## 2018-11-18 LAB
ALBUMIN SERPL-MCNC: 4 G/DL (ref 3.4–5)
ALP SERPL-CCNC: 63 U/L (ref 45–117)
ALT SERPL-CCNC: 61 U/L (ref 12–78)
ANION GAP SERPL CALC-SCNC: 12 MMOL/L (ref 5–15)
BASOPHILS # BLD AUTO: 0.04 X10^3/UL (ref 0–0.1)
BASOPHILS NFR BLD AUTO: 1 % (ref 0–1)
BILIRUB SERPL-MCNC: 0.3 MG/DL (ref 0.2–1)
CALCIUM SERPL-MCNC: 8.7 MG/DL (ref 8.5–10.1)
CHLORIDE SERPL-SCNC: 109 MMOL/L (ref 98–107)
CREAT SERPL-MCNC: 0.84 MG/DL (ref 0.55–1.02)
EOSINOPHIL # BLD AUTO: 0.05 X10^3/UL (ref 0–0.4)
EOSINOPHIL NFR BLD AUTO: 1 % (ref 1–7)
ERYTHROCYTE [DISTWIDTH] IN BLOOD BY AUTOMATED COUNT: 15.9 % (ref 9.6–15.2)
LYMPHOCYTES # BLD AUTO: 2.09 X10^3/UL (ref 1–3.4)
LYMPHOCYTES NFR BLD AUTO: 44 % (ref 22–44)
MCH RBC QN AUTO: 28.5 PG (ref 27–34.8)
MCHC RBC AUTO-ENTMCNC: 33.4 G/DL (ref 32.4–35.8)
MCV RBC AUTO: 85.3 FL (ref 80–100)
MD: NO
MONOCYTES # BLD AUTO: 0.36 X10^3/UL (ref 0.2–0.8)
MONOCYTES NFR BLD AUTO: 8 % (ref 2–9)
NEUTROPHILS # BLD AUTO: 2.26 X10^3/UL (ref 1.8–6.8)
NEUTROPHILS NFR BLD AUTO: 47 % (ref 42–75)
PLATELET # BLD AUTO: 252 X10^3/UL (ref 130–400)
PMV BLD AUTO: 9.4 FL (ref 7.4–10.4)
PROT SERPL-MCNC: 7.3 G/DL (ref 6.4–8.2)
RBC # BLD AUTO: 4.62 X10^6/UL (ref 3.82–5.3)

## 2018-11-18 PROCEDURE — 93005 ELECTROCARDIOGRAM TRACING: CPT

## 2018-11-18 PROCEDURE — 80053 COMPREHEN METABOLIC PANEL: CPT

## 2018-11-18 PROCEDURE — 36415 COLL VENOUS BLD VENIPUNCTURE: CPT

## 2018-11-18 PROCEDURE — 85025 COMPLETE CBC W/AUTO DIFF WBC: CPT

## 2018-11-18 PROCEDURE — 99285 EMERGENCY DEPT VISIT HI MDM: CPT

## 2018-12-09 ENCOUNTER — HOSPITAL ENCOUNTER (EMERGENCY)
Dept: HOSPITAL 8 - ED | Age: 23
Discharge: HOME | End: 2018-12-09
Payer: COMMERCIAL

## 2018-12-09 VITALS — WEIGHT: 183.65 LBS | HEIGHT: 63 IN | BODY MASS INDEX: 32.54 KG/M2

## 2018-12-09 VITALS — DIASTOLIC BLOOD PRESSURE: 50 MMHG | SYSTOLIC BLOOD PRESSURE: 95 MMHG

## 2018-12-09 DIAGNOSIS — Z90.89: ICD-10-CM

## 2018-12-09 DIAGNOSIS — J18.9: Primary | ICD-10-CM

## 2018-12-09 DIAGNOSIS — Z90.49: ICD-10-CM

## 2018-12-09 LAB
ALBUMIN SERPL-MCNC: 4 G/DL (ref 3.4–5)
ALP SERPL-CCNC: 72 U/L (ref 45–117)
ALT SERPL-CCNC: 28 U/L (ref 12–78)
ANION GAP SERPL CALC-SCNC: 8 MMOL/L (ref 5–15)
BASOPHILS # BLD AUTO: 0.05 X10^3/UL (ref 0–0.1)
BASOPHILS NFR BLD AUTO: 1 % (ref 0–1)
BILIRUB SERPL-MCNC: 0.7 MG/DL (ref 0.2–1)
CALCIUM SERPL-MCNC: 9.4 MG/DL (ref 8.5–10.1)
CHLORIDE SERPL-SCNC: 109 MMOL/L (ref 98–107)
CREAT SERPL-MCNC: 0.72 MG/DL (ref 0.55–1.02)
EOSINOPHIL # BLD AUTO: 0.09 X10^3/UL (ref 0–0.4)
EOSINOPHIL NFR BLD AUTO: 1 % (ref 1–7)
ERYTHROCYTE [DISTWIDTH] IN BLOOD BY AUTOMATED COUNT: 14.6 % (ref 9.6–15.2)
LYMPHOCYTES # BLD AUTO: 2.46 X10^3/UL (ref 1–3.4)
LYMPHOCYTES NFR BLD AUTO: 25 % (ref 22–44)
MCH RBC QN AUTO: 29 PG (ref 27–34.8)
MCHC RBC AUTO-ENTMCNC: 33.8 G/DL (ref 32.4–35.8)
MCV RBC AUTO: 85.9 FL (ref 80–100)
MD: NO
MONOCYTES # BLD AUTO: 0.77 X10^3/UL (ref 0.2–0.8)
MONOCYTES NFR BLD AUTO: 8 % (ref 2–9)
NEUTROPHILS # BLD AUTO: 6.55 X10^3/UL (ref 1.8–6.8)
NEUTROPHILS NFR BLD AUTO: 66 % (ref 42–75)
PLATELET # BLD AUTO: 256 X10^3/UL (ref 130–400)
PMV BLD AUTO: 9.7 FL (ref 7.4–10.4)
PROT SERPL-MCNC: 7.4 G/DL (ref 6.4–8.2)
RBC # BLD AUTO: 4.7 X10^6/UL (ref 3.82–5.3)
TROPONIN I SERPL-MCNC: < 0.015 NG/ML (ref 0–0.04)

## 2018-12-09 PROCEDURE — 99284 EMERGENCY DEPT VISIT MOD MDM: CPT

## 2018-12-09 PROCEDURE — 71045 X-RAY EXAM CHEST 1 VIEW: CPT

## 2018-12-09 PROCEDURE — 83690 ASSAY OF LIPASE: CPT

## 2018-12-09 PROCEDURE — 84484 ASSAY OF TROPONIN QUANT: CPT

## 2018-12-09 PROCEDURE — 93005 ELECTROCARDIOGRAM TRACING: CPT

## 2018-12-09 PROCEDURE — 85025 COMPLETE CBC W/AUTO DIFF WBC: CPT

## 2018-12-09 PROCEDURE — 84703 CHORIONIC GONADOTROPIN ASSAY: CPT

## 2018-12-09 PROCEDURE — 36415 COLL VENOUS BLD VENIPUNCTURE: CPT

## 2018-12-09 PROCEDURE — 80053 COMPREHEN METABOLIC PANEL: CPT

## 2019-01-02 ENCOUNTER — HOSPITAL ENCOUNTER (EMERGENCY)
Dept: HOSPITAL 8 - ED | Age: 24
Discharge: HOME | End: 2019-01-02
Payer: COMMERCIAL

## 2019-01-02 VITALS — DIASTOLIC BLOOD PRESSURE: 69 MMHG | SYSTOLIC BLOOD PRESSURE: 117 MMHG

## 2019-01-02 VITALS — BODY MASS INDEX: 31.95 KG/M2 | HEIGHT: 63 IN | WEIGHT: 180.34 LBS

## 2019-01-02 DIAGNOSIS — R11.2: ICD-10-CM

## 2019-01-02 DIAGNOSIS — R19.7: ICD-10-CM

## 2019-01-02 DIAGNOSIS — E86.0: Primary | ICD-10-CM

## 2019-01-02 DIAGNOSIS — Z90.89: ICD-10-CM

## 2019-01-02 DIAGNOSIS — Z87.19: ICD-10-CM

## 2019-01-02 LAB
ALBUMIN SERPL-MCNC: 4.1 G/DL (ref 3.4–5)
ALP SERPL-CCNC: 80 U/L (ref 45–117)
ALT SERPL-CCNC: 27 U/L (ref 12–78)
ANION GAP SERPL CALC-SCNC: 9 MMOL/L (ref 5–15)
BASOPHILS # BLD AUTO: 0.03 X10^3/UL (ref 0–0.1)
BASOPHILS NFR BLD AUTO: 1 % (ref 0–1)
BILIRUB SERPL-MCNC: 0.9 MG/DL (ref 0.2–1)
CALCIUM SERPL-MCNC: 8.4 MG/DL (ref 8.5–10.1)
CHLORIDE SERPL-SCNC: 108 MMOL/L (ref 98–107)
CREAT SERPL-MCNC: 0.74 MG/DL (ref 0.55–1.02)
EOSINOPHIL # BLD AUTO: 0.03 X10^3/UL (ref 0–0.4)
EOSINOPHIL NFR BLD AUTO: 1 % (ref 1–7)
ERYTHROCYTE [DISTWIDTH] IN BLOOD BY AUTOMATED COUNT: 14.4 % (ref 9.6–15.2)
LYMPHOCYTES # BLD AUTO: 1.12 X10^3/UL (ref 1–3.4)
LYMPHOCYTES NFR BLD AUTO: 29 % (ref 22–44)
MCH RBC QN AUTO: 28.6 PG (ref 27–34.8)
MCHC RBC AUTO-ENTMCNC: 33.3 G/DL (ref 32.4–35.8)
MCV RBC AUTO: 86.1 FL (ref 80–100)
MD: NO
MONOCYTES # BLD AUTO: 0.44 X10^3/UL (ref 0.2–0.8)
MONOCYTES NFR BLD AUTO: 11 % (ref 2–9)
NEUTROPHILS # BLD AUTO: 2.27 X10^3/UL (ref 1.8–6.8)
NEUTROPHILS NFR BLD AUTO: 58 % (ref 42–75)
PLATELET # BLD AUTO: 229 X10^3/UL (ref 130–400)
PMV BLD AUTO: 10.2 FL (ref 7.4–10.4)
PROT SERPL-MCNC: 7.7 G/DL (ref 6.4–8.2)
RBC # BLD AUTO: 5.13 X10^6/UL (ref 3.82–5.3)

## 2019-01-02 PROCEDURE — 85025 COMPLETE CBC W/AUTO DIFF WBC: CPT

## 2019-01-02 PROCEDURE — 96374 THER/PROPH/DIAG INJ IV PUSH: CPT

## 2019-01-02 PROCEDURE — 96361 HYDRATE IV INFUSION ADD-ON: CPT

## 2019-01-02 PROCEDURE — 80053 COMPREHEN METABOLIC PANEL: CPT

## 2019-01-02 PROCEDURE — 74021 RADEX ABDOMEN 3+ VIEWS: CPT

## 2019-01-02 PROCEDURE — 83690 ASSAY OF LIPASE: CPT

## 2019-01-02 PROCEDURE — 84703 CHORIONIC GONADOTROPIN ASSAY: CPT

## 2019-01-02 PROCEDURE — 96375 TX/PRO/DX INJ NEW DRUG ADDON: CPT

## 2019-01-02 PROCEDURE — 99284 EMERGENCY DEPT VISIT MOD MDM: CPT

## 2019-01-02 PROCEDURE — 36415 COLL VENOUS BLD VENIPUNCTURE: CPT

## 2019-08-14 ENCOUNTER — HOSPITAL ENCOUNTER (EMERGENCY)
Dept: HOSPITAL 8 - ED | Age: 24
Discharge: HOME | End: 2019-08-14
Payer: COMMERCIAL

## 2019-08-14 VITALS — DIASTOLIC BLOOD PRESSURE: 64 MMHG | SYSTOLIC BLOOD PRESSURE: 110 MMHG

## 2019-08-14 VITALS — WEIGHT: 177.69 LBS | BODY MASS INDEX: 31.48 KG/M2 | HEIGHT: 63 IN

## 2019-08-14 DIAGNOSIS — Z90.49: ICD-10-CM

## 2019-08-14 DIAGNOSIS — Z88.5: ICD-10-CM

## 2019-08-14 DIAGNOSIS — J02.8: Primary | ICD-10-CM

## 2019-08-14 DIAGNOSIS — B97.89: ICD-10-CM

## 2019-08-14 DIAGNOSIS — Z90.89: ICD-10-CM

## 2019-08-14 PROCEDURE — 71046 X-RAY EXAM CHEST 2 VIEWS: CPT

## 2019-08-14 PROCEDURE — 99283 EMERGENCY DEPT VISIT LOW MDM: CPT

## 2019-08-14 PROCEDURE — 93005 ELECTROCARDIOGRAM TRACING: CPT

## 2020-05-27 ENCOUNTER — HOSPITAL ENCOUNTER (EMERGENCY)
Dept: HOSPITAL 8 - ED | Age: 25
Discharge: HOME | End: 2020-05-27
Payer: COMMERCIAL

## 2020-05-27 VITALS — DIASTOLIC BLOOD PRESSURE: 53 MMHG | SYSTOLIC BLOOD PRESSURE: 90 MMHG

## 2020-05-27 VITALS — HEIGHT: 63 IN | WEIGHT: 177.69 LBS | BODY MASS INDEX: 31.48 KG/M2

## 2020-05-27 DIAGNOSIS — R19.7: ICD-10-CM

## 2020-05-27 DIAGNOSIS — K29.00: Primary | ICD-10-CM

## 2020-05-27 DIAGNOSIS — R94.31: ICD-10-CM

## 2020-05-27 DIAGNOSIS — R10.13: ICD-10-CM

## 2020-05-27 DIAGNOSIS — R11.0: ICD-10-CM

## 2020-05-27 LAB
ALBUMIN SERPL-MCNC: 4.2 G/DL (ref 3.4–5)
ALP SERPL-CCNC: 61 U/L (ref 45–117)
ALT SERPL-CCNC: 21 U/L (ref 12–78)
ANION GAP SERPL CALC-SCNC: 6 MMOL/L (ref 5–15)
BASOPHILS # BLD AUTO: 0.05 X10^3/UL (ref 0–0.1)
BASOPHILS NFR BLD AUTO: 1 % (ref 0–1)
BILIRUB SERPL-MCNC: 0.4 MG/DL (ref 0.2–1)
CALCIUM SERPL-MCNC: 8.6 MG/DL (ref 8.5–10.1)
CHLORIDE SERPL-SCNC: 111 MMOL/L (ref 98–107)
CREAT SERPL-MCNC: 0.77 MG/DL (ref 0.55–1.02)
EOSINOPHIL # BLD AUTO: 0.04 X10^3/UL (ref 0–0.4)
EOSINOPHIL NFR BLD AUTO: 1 % (ref 1–7)
ERYTHROCYTE [DISTWIDTH] IN BLOOD BY AUTOMATED COUNT: 14.1 % (ref 9.6–15.2)
LYMPHOCYTES # BLD AUTO: 2.11 X10^3/UL (ref 1–3.4)
LYMPHOCYTES NFR BLD AUTO: 29 % (ref 22–44)
MCH RBC QN AUTO: 29.9 PG (ref 27–34.8)
MCHC RBC AUTO-ENTMCNC: 33.1 G/DL (ref 32.4–35.8)
MCV RBC AUTO: 90.3 FL (ref 80–100)
MD: NO
MICROSCOPIC: (no result)
MONOCYTES # BLD AUTO: 0.49 X10^3/UL (ref 0.2–0.8)
MONOCYTES NFR BLD AUTO: 7 % (ref 2–9)
NEUTROPHILS # BLD AUTO: 4.6 X10^3/UL (ref 1.8–6.8)
NEUTROPHILS NFR BLD AUTO: 63 % (ref 42–75)
PLATELET # BLD AUTO: 255 X10^3/UL (ref 130–400)
PMV BLD AUTO: 9.6 FL (ref 7.4–10.4)
PROT SERPL-MCNC: 7.2 G/DL (ref 6.4–8.2)
RBC # BLD AUTO: 4.53 X10^6/UL (ref 3.82–5.3)

## 2020-05-27 PROCEDURE — 96374 THER/PROPH/DIAG INJ IV PUSH: CPT

## 2020-05-27 PROCEDURE — 36415 COLL VENOUS BLD VENIPUNCTURE: CPT

## 2020-05-27 PROCEDURE — 96375 TX/PRO/DX INJ NEW DRUG ADDON: CPT

## 2020-05-27 PROCEDURE — 84703 CHORIONIC GONADOTROPIN ASSAY: CPT

## 2020-05-27 PROCEDURE — 80053 COMPREHEN METABOLIC PANEL: CPT

## 2020-05-27 PROCEDURE — 85025 COMPLETE CBC W/AUTO DIFF WBC: CPT

## 2020-05-27 PROCEDURE — 83690 ASSAY OF LIPASE: CPT

## 2020-05-27 PROCEDURE — 99284 EMERGENCY DEPT VISIT MOD MDM: CPT

## 2020-05-27 PROCEDURE — 93005 ELECTROCARDIOGRAM TRACING: CPT

## 2020-05-27 PROCEDURE — 81003 URINALYSIS AUTO W/O SCOPE: CPT

## 2020-05-27 NOTE — NUR
RANCHO GONZALEZ BS FOR EXAM.  PT REPORTS PAIN IS WORSE AFTER EATING AND TAKING HER 
MEDS. HAS CIPRO RX WAITING TO BE PICKED UP AT PHARMACY.

## 2020-05-27 NOTE — NUR
PT STATES SHE'S BEING FOLLOWED [facility] FOR CHRONIC CP.  
APPOINTMENT ON JUNE 15TH W/ RHEUMATOLOGIST.

## 2020-05-27 NOTE — NUR
C/O EPIGATSTRIC ABD PAIN X 3 DAYS, WORSENED TODAY.  DIARRHEA X 5 DAYS.  
+NAUSEA, "HEAT FLASHES WHERE I GET REALLY CLAMMY".  DENIES RECENT ANTIBIOTIC 
USE, FEVER, VOMITING.  NO MEDS TAKEN FOR SX.  IS TAKING OMEPRAZOLE DAILY.  LAST 
ORAL INTAKE: 0700 TODAY.   LAST BM: DIARRHEA THIS MORNING.  LMP: 5/5/2020  
DENIES COVID EXPOSURE.

## 2020-09-30 ENCOUNTER — OFFICE VISIT (OUTPATIENT)
Dept: URGENT CARE | Facility: CLINIC | Age: 25
End: 2020-09-30
Payer: COMMERCIAL

## 2020-09-30 ENCOUNTER — APPOINTMENT (OUTPATIENT)
Dept: RADIOLOGY | Facility: IMAGING CENTER | Age: 25
End: 2020-09-30
Attending: FAMILY MEDICINE
Payer: COMMERCIAL

## 2020-09-30 VITALS
HEIGHT: 63 IN | DIASTOLIC BLOOD PRESSURE: 64 MMHG | HEART RATE: 98 BPM | OXYGEN SATURATION: 98 % | BODY MASS INDEX: 31.89 KG/M2 | WEIGHT: 180 LBS | TEMPERATURE: 99.2 F | SYSTOLIC BLOOD PRESSURE: 120 MMHG

## 2020-09-30 DIAGNOSIS — S63.501A SPRAIN OF RIGHT WRIST, INITIAL ENCOUNTER: ICD-10-CM

## 2020-09-30 PROCEDURE — 73110 X-RAY EXAM OF WRIST: CPT | Mod: TC,RT | Performed by: FAMILY MEDICINE

## 2020-09-30 PROCEDURE — 99214 OFFICE O/P EST MOD 30 MIN: CPT | Performed by: FAMILY MEDICINE

## 2020-09-30 RX ORDER — METHOCARBAMOL 500 MG/1
TABLET, FILM COATED ORAL 4 TIMES DAILY PRN
COMMUNITY

## 2020-10-01 NOTE — PROGRESS NOTES
Subjective:      Chief Complaint   Patient presents with   • Wrist Pain     fell, wrist swollen, hard to grab things, right wrist               Wrist Injury   The incident occurred 2 days ago. . The injury mechanism was a fall. The pain is present in the right wrist. The quality of the pain is described as aching. The pain does not radiate. The pain is mild. Pertinent negatives include no chest pain, muscle weakness, numbness or tingling. The symptoms are aggravated by movement and palpation. Pt has tried nothing for the symptoms.     Social History     Tobacco Use   • Smoking status: Never Smoker   Substance Use Topics   • Alcohol use: No     Comment: weekends   • Drug use: No         Past Medical History:   Diagnosis Date   • Pericarditis 05/2018   • Tonsillitis          Current Outpatient Medications on File Prior to Visit   Medication Sig Dispense Refill   • GABAPENTIN, ONCE-DAILY, PO Take  by mouth.     • metFORMIN (GLUCOPHAGE) 500 MG Tab Take 500 mg by mouth 2 times a day, with meals.     • lamoTRIgine (LAMICTAL PO) Take  by mouth.     • methocarbamol (ROBAXIN) 500 MG Tab Take  by mouth 4 times a day.     • PANTOPRAZOLE SODIUM PO Take  by mouth.     • ondansetron (ZOFRAN ODT) 4 MG TABLET DISPERSIBLE Take 1 Tab by mouth every 6 hours as needed for Nausea. (Patient not taking: Reported on 9/30/2020) 10 Tab 0   • omeprazole (PRILOSEC) 20 MG delayed-release capsule Take 20 mg by mouth every day.     • lidocaine viscous 2% (XYLOCAINE) 2 % Solution Take 15 mL by mouth as needed for Throat/Mouth Pain. (Patient not taking: Reported on 9/30/2020) 120 mL 1   • Phenol-Phenolate Sodium (THROAT SPRAY MT) Spray  in mouth/throat.     • TYLENOL 8 HOUR PO Take  by mouth.       No current facility-administered medications on file prior to visit.          Review of Systems   Constitutional: Negative for fever.   Respiratory: Negative for shortness of breath.    Cardiovascular: Negative for chest pain.   Neurological: Negative  "for tingling and numbness.   All other systems reviewed and are negative.         Objective:     /64   Pulse 98   Temp 37.3 °C (99.2 °F) (Temporal)   Ht 1.6 m (5' 3\")   Wt 81.6 kg (180 lb)   SpO2 98%     Physical Exam   Constitutional: pt is oriented to person, place, and time. Pt appears well-developed and well-nourished. No distress.   HENT:   Head: Normocephalic and atraumatic.   Eyes: Conjunctivae are normal.   Cardiovascular: Normal rate.    Pulmonary/Chest: Effort normal.   Musculoskeletal:        Right wrist: pt exhibits tenderness , but no edema, bruising.   normal range of motion and no crepitus.        Right hand: Normal sensation noted.  Dec strength noted.   There is no scaphoid tenderness.   Neurological: pt is alert and oriented to person, place, and time.   Skin: Skin is warm. Pt is not diaphoretic. No erythema.   Nursing note and vitals reviewed.              Assessment/Plan:       1. Sprain of right wrist, initial encounter    X-rays were personally reviewed by myself.   There is no fracture.       Wrist splint    rx karel gel prn    Follow up in one week if no improvement, sooner if symptoms worsen.       "

## 2021-02-03 ENCOUNTER — OFFICE VISIT (OUTPATIENT)
Dept: URGENT CARE | Facility: CLINIC | Age: 26
End: 2021-02-03
Payer: COMMERCIAL

## 2021-02-03 ENCOUNTER — OFFICE VISIT (OUTPATIENT)
Dept: NEUROLOGY | Facility: MEDICAL CENTER | Age: 26
End: 2021-02-03
Attending: NURSE PRACTITIONER
Payer: COMMERCIAL

## 2021-02-03 VITALS
TEMPERATURE: 96.5 F | RESPIRATION RATE: 14 BRPM | SYSTOLIC BLOOD PRESSURE: 106 MMHG | DIASTOLIC BLOOD PRESSURE: 70 MMHG | OXYGEN SATURATION: 100 % | HEART RATE: 70 BPM

## 2021-02-03 VITALS
DIASTOLIC BLOOD PRESSURE: 64 MMHG | HEIGHT: 63 IN | OXYGEN SATURATION: 96 % | HEART RATE: 79 BPM | SYSTOLIC BLOOD PRESSURE: 108 MMHG | WEIGHT: 178.57 LBS | BODY MASS INDEX: 31.64 KG/M2 | TEMPERATURE: 97.4 F

## 2021-02-03 DIAGNOSIS — G40.109 LOCALIZATION-RELATED EPILEPSY (HCC): ICD-10-CM

## 2021-02-03 DIAGNOSIS — J02.9 PHARYNGITIS, UNSPECIFIED ETIOLOGY: ICD-10-CM

## 2021-02-03 DIAGNOSIS — Z13.31 SCREENING FOR DEPRESSION: ICD-10-CM

## 2021-02-03 DIAGNOSIS — E55.9 VITAMIN D DEFICIENCY: ICD-10-CM

## 2021-02-03 LAB
HETEROPH AB SER QL LA: NEGATIVE
INT CON NEG: NORMAL
INT CON NEG: NORMAL
INT CON POS: NORMAL
INT CON POS: NORMAL
S PYO AG THROAT QL: NEGATIVE

## 2021-02-03 PROCEDURE — 86308 HETEROPHILE ANTIBODY SCREEN: CPT | Performed by: PHYSICIAN ASSISTANT

## 2021-02-03 PROCEDURE — 87880 STREP A ASSAY W/OPTIC: CPT | Performed by: PHYSICIAN ASSISTANT

## 2021-02-03 PROCEDURE — 99204 OFFICE O/P NEW MOD 45 MIN: CPT | Performed by: NURSE PRACTITIONER

## 2021-02-03 PROCEDURE — 99212 OFFICE O/P EST SF 10 MIN: CPT | Performed by: NURSE PRACTITIONER

## 2021-02-03 PROCEDURE — 99213 OFFICE O/P EST LOW 20 MIN: CPT | Performed by: PHYSICIAN ASSISTANT

## 2021-02-03 RX ORDER — LAMOTRIGINE 100 MG/1
100 TABLET ORAL
Qty: 30 TAB | Refills: 5 | Status: SHIPPED | OUTPATIENT
Start: 2021-02-03 | End: 2021-03-03

## 2021-02-03 ASSESSMENT — ENCOUNTER SYMPTOMS
SHORTNESS OF BREATH: 0
NAUSEA: 0
DIZZINESS: 0
DIARRHEA: 0
ABDOMINAL PAIN: 0
NECK PAIN: 0
VOMITING: 0
FEVER: 0
SPUTUM PRODUCTION: 0
COUGH: 0
CHILLS: 0
HEADACHES: 0
SINUS PAIN: 0
MYALGIAS: 0
SORE THROAT: 1

## 2021-02-03 ASSESSMENT — PATIENT HEALTH QUESTIONNAIRE - PHQ9: CLINICAL INTERPRETATION OF PHQ2 SCORE: 0

## 2021-02-03 NOTE — PROGRESS NOTES
Chief Complaint   Patient presents with   • New Patient     Epilepsy/last seizure Dec 2019       Problem List Items Addressed This Visit     None      Visit Diagnoses     Localization-related epilepsy (HCC)        Relevant Medications    lamoTRIgine (LAMICTAL) 100 MG Tab    Other Relevant Orders    MR-BRAIN-WITH & W/O    REFERRAL TO NEURODIAGNOSTICS (EEG,EP,EMG/NCS/DBS)    Comp Metabolic Panel    CBC WITH DIFFERENTIAL    Vitamin D deficiency        Relevant Orders    VITAMIN D,25 HYDROXY    Screening for depression              History of present illness:  Theodora Ponce 25 y.o. female presents today for seizure evaluation.    Pt reports she had 2 seizures in one night in August 2018 preceded by a series of surgical procedures and complications. She was given keppra but had side effects (suicidal thoughts, drowsiness, fatigue) and she was switched to lamotrigine because when she was weaned off keppra she had another convulsion in December 2019. All her seizures were in her sleep at night. There was tongue biting but no incontinence. There was post-ictal confusion and headache. She had EEG done at [place] in 2018 which was normal per pt but does not recall having MRI brain done. No specific triggers. She was not sleep deprived. She is currently on lamotrigine 100mg QHS. No side effects. Compliant. No rash. No further spells. She denies any jerking or staring off spells.     No family hx of epilepsy. No hx of TBI    She is not drinking alcohol. Not smoking cigarettes or recreational drug use.        Mood is good. Denies depression or anxiety. No suicidal or homicidal thoughts.    She is homosexual and is not interested in having sexual interactions with men.     She is taking vit d    She is driving with no issues.           Past medical history:   Past Medical History:   Diagnosis Date   • Pericarditis 05/2018   • Tonsillitis        Past surgical history:   Past Surgical History:   Procedure Laterality Date   •  CHOLECYSTECTOMY  05/2018   • APPENDECTOMY  04/2018   • TONSILLECTOMY  2/24/2010    Performed by YULIYA PEDERSEN at SURGERY SAME DAY [facility]       Family history:   History reviewed. No pertinent family history.    Social history:   Social History     Socioeconomic History   • Marital status: Single     Spouse name: Not on file   • Number of children: Not on file   • Years of education: Not on file   • Highest education level: Not on file   Occupational History   • Not on file   Social Needs   • Financial resource strain: Not on file   • Food insecurity     Worry: Not on file     Inability: Not on file   • Transportation needs     Medical: Not on file     Non-medical: Not on file   Tobacco Use   • Smoking status: Never Smoker   Substance and Sexual Activity   • Alcohol use: No     Comment: weekends   • Drug use: No   • Sexual activity: Not on file   Lifestyle   • Physical activity     Days per week: Not on file     Minutes per session: Not on file   • Stress: Not on file   Relationships   • Social connections     Talks on phone: Not on file     Gets together: Not on file     Attends Synagogue service: Not on file     Active member of club or organization: Not on file     Attends meetings of clubs or organizations: Not on file     Relationship status: Not on file   • Intimate partner violence     Fear of current or ex partner: Not on file     Emotionally abused: Not on file     Physically abused: Not on file     Forced sexual activity: Not on file   Other Topics Concern   • Not on file   Social History Narrative    ** Merged History Encounter **            Current medications:   Current Outpatient Medications   Medication   • GABAPENTIN, ONCE-DAILY, PO   • metFORMIN (GLUCOPHAGE) 500 MG Tab   • lamoTRIgine (LAMICTAL PO)   • methocarbamol (ROBAXIN) 500 MG Tab   • PANTOPRAZOLE SODIUM PO   • Diclofenac Sodium (VOLTAREN) 1 % Gel   • ondansetron (ZOFRAN ODT) 4 MG TABLET DISPERSIBLE   • omeprazole (PRILOSEC) 20 MG  "delayed-release capsule   • lidocaine viscous 2% (XYLOCAINE) 2 % Solution   • Phenol-Phenolate Sodium (THROAT SPRAY MT)   • TYLENOL 8 HOUR PO     No current facility-administered medications for this visit.        Medication Allergy:  Allergies   Allergen Reactions   • Vicodin [Hydrocodone-Acetaminophen] Hives         Review of systems:     General: Denies fevers or chills, or nightsweats, or generalized fatigue.    Head: Denies headaches or dizziness or lightheadedness  EENT: Denies vision changes, vision loss or pain, nasal secretion, nasal bleeding, difficulty swallowing, hearing loss, tinnitus, vertigo, ear pain  Respiratory: Denies shortness of breath, cough, sputum, or wheezing  Cardiac: Denies chest pain, palpitations, edema or syncope  Gastrointestinal: Denies nausea, vomiting, no abdominal pain or change in bowel habits, no melena or hematochezia  Urinary: Denies dysuria, frequency, hesitancy, or incontinence.  Dermatologic:  Denies new rash  Musculoskeletal: Denies muscle pain or swelling, no atrophy, no neck and back pain or stiffness.   Neurologic: Denies facial droopiness, muscle weakness (focal or generalized), paresthesias, ataxia, change in speech or language, memory loss, abnormal movements. + seizures, loss of consciousness  Psychiatric: Denies anxiety, depression, mood swings, suicidal or homicidal thoughts       Physical examination:   Vitals:    02/03/21 0723   BP: 108/64   BP Location: Left arm   Patient Position: Sitting   BP Cuff Size: Adult   Pulse: 79   Temp: 36.3 °C (97.4 °F)   TempSrc: Temporal   SpO2: 96%   Weight: 81 kg (178 lb 9.2 oz)   Height: 1.6 m (5' 3\")     General: Patient in no acute distress, pleasant and cooperative.  HEENT: Normocephalic, no signs of acute trauma.   moist conjunctivae. Nares are patent. Oropharynx clear without lesions and normal  hard and soft palates.   Neck: Supple. There is normal range of motion.   Resp: clear to auscultation bilaterally. No wheezes or " crackles.   CV: RRR, no murmurs.   Skin: no signs of acute rashes or trauma.   Musculoskeletal: joints exhibit full range of motion  Psychiatric: No hallucinatory behavior. No symptoms of depression or suicidal ideation. Mood and affect appear normal on exam.     NEUROLOGICAL EXAM:   Mental status, orientation: Awake, alert and fully oriented.   Speech and language: speech is clear and fluent. The patient is able to name, repeat and comprehend.   Memory: There is intact recollection of recent and remote events.   Cranial nerve exam:   CN I: Not examined   CN II: PERRL.   CN III, IV, VI: EOMI; no nystagmus   CN V: Facial sensation intact bilaterally   CN VII: face symmetric   CN VIII: hearing intact to finger rub bilaterally   CN IX, X: palate elevates symmetrically   CN XI: Symmetric shoulder shrug  CN XII: tongue midline. No signs of tongue biting or fasciculations   Motor exam: Strength is 5/5 in all extremities. Tone is normal. No abnormal movements were seen on exam.   Sensory exam reveals normal sense of light touch in all extremities.   Deep tendon reflexes:  2+ throughout. Plantar responses are flexor. There is no clonus.   Coordination: shows a normal finger-nose-finger. Normal rapidly alternating movements.   Gait: The patient was able to get up from seated position on first attempt without requiring assistance. Found to be steady when walking. Movements were fluid with normal arm swing. The patient was able to turn without difficulties or tendency to fall. Romberg exam unremarkable      ANCILLARY DATA REVIEWED:       Lab Data Review:  Reviewed in chart.     Records reviewed:   Reviewed in chart.    Imaging:   n/a    EEG:  n/a        ASSESSMENT AND PLAN:    1. Localization-related epilepsy (HCC)  - MR-BRAIN-WITH & W/O; Future  - REFERRAL TO NEURODIAGNOSTICS (EEG,EP,EMG/NCS/DBS)  - Comp Metabolic Panel; Future  - CBC WITH DIFFERENTIAL; Future  - lamoTRIgine (LAMICTAL) 100 MG Tab; Take 1 Tab by mouth every  bedtime.  Dispense: 30 Tab; Refill: 5    2. Vitamin D deficiency  - VITAMIN D,25 HYDROXY; Future    3. Screening for depression          CLINICAL DISCUSSION:  Nature of spells unknown. Epileptic vs non epileptic spells. Started in 2018 after a series of surgical procedures complicated by pericardial effusions. She initially had 2 convulsions in one night. She was started on keppra then was weaned off in  d/t side effects. She then had a breakthrough seizure in Dec 2019. She was then started on low dose lamotrigine 100mg QHS and has been spell free since. No side effects. No rash. No triggers. There was tongue biting but no incontinence. Spells were at night when she was sleeping.     No family hx of epilepsy. No TBI hx.     Not drinking alcohol. Not using alcohol or recreational drug use.     Mood is good. No suicidal or homicidal thoughts.      She is homosexual and not interested in having sexual intercourse with the opposite sex. Not planning on getting pregnant. She is aware of the risk of pregnancy complications while taking ASMs which include congenital defects, abnormal fetal growth, spontaneous , etc. She will let me know if she plans on having a baby someday and we can start folic acid.     Past ASM's: Keppra (suicidal thoughts, drowsiness, fatigue)    Current ASM's: Lamotrigine 100mg QHS      Plan:  - Will request copy of EEG at [facility]    -Routine EEG and MRI brain. Pt is interested in challenging the diagnosis in the future.     - Discussed avoidance of spell/sz triggers: alcohol, sleep deprivation, energy drinks and stress.    - Discussed Vit D supplementation. Recommended taking 2000-5000u daily.    - Discussed driving restrictions. Okay to drive but aware to stop driving immediately if a spell occurs and report to us.      -Labs to be checked for next appointment: CBC, CMP, vit D          FOLLOW-UP:   Return in about 4 weeks (around 3/3/2021).         EDUCATION AND  COUNSELING:  -Education was provided to the patient and/or family regarding diagnosis and prognosis. The chronic and unpredictable nature of the condition were discussed. There is increased risk for additional events, which may carry potential for significant injuries and death. Discussed frequent seizure triggers: sleep deprivation, medication non-compliance, use of illegal drugs/alcohol, stress, and others.   -We reviewed in detail the current antiepileptic regimen. Potential side effects of antiepileptics were discussed at length, including but no limited to: hypersensitivity reactions (rash and others, some of which can be fatal), visual field changes (some of which may be irreversible), glaucoma, diplopia, kidney stones, osteopenia/osteoporosis/bone fractures, hyperthermia/anhydrosis, hyponatremia, tremors/abnormal movements, ataxia, dizziness, fatigue, increased risk for falls, risk for cardiac arrhythmias/syncope, gastrointestinal side effects(hepatitis, pancreatitis, gastritis, ulcers), gingival hypertrophy/bleeding, drowsiness, sedation, anxiety/nervousness, increased risk for suicide, increased risk for depression, and psychosis.   -We also reviewed drug-drug interactions and their potential effect on seizure control and medication side effects.    -We also discussed in detail potential effects of seizures, epilepsy, and medications during pregnancy, including but not limited to fetal malformations, child developmental/intellectual disability, fetal/ risk for hemorrhages, stillbirth, maternal death, premature birth, and others. The patient/family aware that pregnancy should be avoided, unless desired, in which case we recommend discussing with us at least a year prior to planned conception. To avoid undesired pregnancy while on antiepileptics, we recommend dual contraception.   -Folic acid 2 mg is recommended for all females in childbearing age (12-44 years of age).   -Recommend chronic vitamin D  supplementation and regular exercise (if not contraindicated).   -Patient/family educated on risk for SUDEP (Sudden Death in Epilepsy). Counseling was provided on the importance of strict medication and follow up compliance. The patient/family understand the risks associated with non-adherence with the medical plan as outlined, including but not limited to an increased risk for breakthrough seizures, which may contribute to injuries, disability, status epilepticus, and even death.   -Counseling was also provided on potential effects of alcohol and other drugs, which may lower seizure threshold and/or affect the metabolism of antiepileptic drugs. We recommend avoidance of alcohol and illegal drugs.  -Avoid sleep deprivation.   -We extensively discussed the aspects related to safety in drivers who suffer from epilepsy. The patient is encourage to report to the Division of Motor Vehicles of any condition and/or spells related to confusion, disorientation, and/or loss of awareness and/or loss of consciousness; as these may pose a safety issue if they occur while operating a motor vehicle. The patient and/or family are ultimately responsible for exercising caution and abiding to regulations in place.   -Other seizure precautions were discussed at length, including no diving, no skydiving, no climbing or exposure to unprotected heights, no unsupervised swimming, no Jacuzzi or bathing in bathtubs or deep bodies of water. The patient/family have been advised about risks for operating any machinery while suffering from seizures / syncope / epilepsy and/or while taking antiepileptic drugs.   -The patient understands and agrees that due to the complexity of his/her diagnosis, results of any testing and further recommendations will typically be discussed/made during a face to face encounter in my office. The patient and/or family further understands it is their responsibility to keep proper follow up.     Patient agrees with  plan, as outlined.         Deandra Gipson, ABDIFATAH, APRN, FNP-C  [facility]  Office: [phone]  Fax: [phone]

## 2021-02-03 NOTE — PROGRESS NOTES
Subjective:   Theodora Ponce is a 25 y.o. female who presents for Sore Throat (x2days, blisters in mouth, mouth is redness)      HPI  25 y.o. female presents to urgent care with new problem to provider of worsening sore throat and fatigue for the last 2 days.  She notes small blisters in the back of her throat.  She denies fevers, headache, body ache, nausea, vomiting, diarrhea, or cough.  She denies confirmed exposure to COVID-19 or sick contacts in her home.  She has not been taking over-the-counter medications for her symptoms. Denies other associated aggravating or alleviating factors.     Review of Systems   Constitutional: Positive for malaise/fatigue. Negative for chills and fever.   HENT: Positive for sore throat. Negative for congestion, ear pain and sinus pain.    Respiratory: Negative for cough, sputum production and shortness of breath.    Gastrointestinal: Negative for abdominal pain, diarrhea, nausea and vomiting.   Musculoskeletal: Negative for myalgias and neck pain.   Neurological: Negative for dizziness and headaches.       Patient Active Problem List   Diagnosis   • Pain in the chest, secondary to pericarditis   • Leukocytosis   • Pleural effusion   • Pericarditis   • Pericardial effusion     Past Surgical History:   Procedure Laterality Date   • CHOLECYSTECTOMY  05/2018   • APPENDECTOMY  04/2018   • TONSILLECTOMY  2/24/2010    Performed by YULIYA PEDERSEN at SURGERY [facility] DAY [facility]     Social History     Tobacco Use   • Smoking status: Never Smoker   • Smokeless tobacco: Never Used   Substance Use Topics   • Alcohol use: No     Comment: weekends   • Drug use: No      History reviewed. No pertinent family history.   (Allergies, Medications, & Tobacco/Substance Use were reconciled by the Medical Assistant and reviewed by myself. The family history is prepopulated)     Objective:     /70 (BP Location: Left arm, Patient Position: Sitting, BP Cuff Size: Adult)   Pulse 70   Temp 35.8  °C (96.5 °F) (Temporal)   Resp 14   SpO2 100%     Physical Exam  Vitals signs reviewed.   Constitutional:       General: She is not in acute distress.     Appearance: Normal appearance. She is well-developed. She is not ill-appearing.   HENT:      Head: Normocephalic and atraumatic.      Nose: Nose normal.      Mouth/Throat:      Mouth: Mucous membranes are moist.      Pharynx: Posterior oropharyngeal erythema present.      Tonsils: No tonsillar exudate.     Eyes:      Conjunctiva/sclera: Conjunctivae normal.   Neck:      Musculoskeletal: Normal range of motion and neck supple.   Cardiovascular:      Rate and Rhythm: Normal rate and regular rhythm.      Heart sounds: Normal heart sounds.   Pulmonary:      Effort: Pulmonary effort is normal. No respiratory distress.      Breath sounds: Normal breath sounds.   Lymphadenopathy:      Cervical: Cervical adenopathy present.   Skin:     General: Skin is warm and dry.      Findings: No rash.   Neurological:      General: No focal deficit present.      Mental Status: She is alert and oriented to person, place, and time.   Psychiatric:         Mood and Affect: Mood normal.         Behavior: Behavior normal.         Thought Content: Thought content normal.         Judgment: Judgment normal.         Assessment/Plan:     1. Pharyngitis, unspecified etiology  POCT Rapid Strep A    POCT Mononucleosis (mono)     Results for orders placed or performed in visit on 02/03/21   POCT Rapid Strep A   Result Value Ref Range    Rapid Strep Screen Negative     Internal Control Positive Valid     Internal Control Negative Valid    POCT Mononucleosis (mono)   Result Value Ref Range    Heterophile Screen Negative     Internal Control Positive Valid     Internal Control Negative Valid      Patient symptoms are most likely viral in nature.  She has vesicular type lesions over oropharynx and history of cold sores.  She does not have any other symptoms other than mild fatigue and a sore throat.   Patient will continue to monitor for worsening of symptoms including development of headaches, body aches, and fevers.  Recommend patient return for reevaluation and possible COVID-19 testing if she develops symptoms related to COVID-19 including cough or fever.  Recommend over-the-counter ibuprofen/Tylenol.  Drink plenty of fluids and rest.  Recommend patient remain in self-isolation until her symptoms have resolved for at least 24 hours per CDC guidelines.    Differential diagnosis, natural history, supportive care, and indications for immediate follow-up discussed.    Advised the patient to follow-up with the primary care physician for recheck, reevaluation, and consideration of further management.  Patient verbalized understanding of treatment plan and has no further questions regarding care.     Please note that this dictation was created using voice recognition software. I have made a reasonable attempt to correct obvious errors, but I expect that there are errors of grammar and possibly content that I did not discover before finalizing the note.    This note was electronically signed by Noemí Perez PA-C

## 2021-02-17 ENCOUNTER — APPOINTMENT (OUTPATIENT)
Dept: RADIOLOGY | Facility: MEDICAL CENTER | Age: 26
End: 2021-02-17
Attending: NURSE PRACTITIONER
Payer: COMMERCIAL

## 2021-02-17 DIAGNOSIS — G40.109 LOCALIZATION-RELATED EPILEPSY (HCC): ICD-10-CM

## 2021-02-17 PROCEDURE — 70553 MRI BRAIN STEM W/O & W/DYE: CPT

## 2021-02-17 PROCEDURE — A9576 INJ PROHANCE MULTIPACK: HCPCS | Performed by: NURSE PRACTITIONER

## 2021-02-17 PROCEDURE — 700117 HCHG RX CONTRAST REV CODE 255: Performed by: NURSE PRACTITIONER

## 2021-02-17 RX ORDER — GADOBUTROL 604.72 MG/ML
15 INJECTION INTRAVENOUS ONCE
Status: ACTIVE | OUTPATIENT
Start: 2021-02-17 | End: 2021-02-18

## 2021-02-17 RX ADMIN — GADOTERIDOL 15 ML: 279.3 INJECTION, SOLUTION INTRAVENOUS at 08:29

## 2021-02-19 ENCOUNTER — NON-PROVIDER VISIT (OUTPATIENT)
Dept: NEUROLOGY | Facility: MEDICAL CENTER | Age: 26
End: 2021-02-19
Attending: NURSE PRACTITIONER
Payer: COMMERCIAL

## 2021-02-19 DIAGNOSIS — G40.109 LOCALIZATION-RELATED EPILEPSY (HCC): ICD-10-CM

## 2021-02-19 PROCEDURE — 95819 EEG AWAKE AND ASLEEP: CPT | Mod: 26 | Performed by: PSYCHIATRY & NEUROLOGY

## 2021-02-19 PROCEDURE — 95819 EEG AWAKE AND ASLEEP: CPT | Performed by: PSYCHIATRY & NEUROLOGY

## 2021-02-19 NOTE — PROCEDURES
ROUTINE ELECTROENCEPHALOGRAM REPORT      Referring provider: JEM Sol.     DOS: 2/19/2021 (total recording of 27 minutes)    INDICATION:  Theodora Ponce 25 y.o. female presenting with seizures.     CURRENT ANTIEPILEPTIC REGIMEN: Lamotrigine 100 mg qhs.     TECHNIQUE: 30 channel routine electroencephalogram (EEG) was performed in accordance with the international 10-20 system. The study was reviewed in bipolar and referential montages. The recording examined the patient during wakeful and drowsy/sleep state(s).     DESCRIPTION OF THE RECORD:  During the wakefulness, the background showed a symmetrical 10 Hz alpha activity posteriorly with amplitude of 70 mV.  There was reactivity to eye closure/opening.  A normal anterior-posterior gradient was noted with faster beta frequencies seen anteriorly.  During drowsiness, theta/delta frequencies were seen.    During the sleep state, background shows diffuse high-amplitude 4-5 Hz delta activity.  Symmetrical high-amplitude sleep spindles and vertex sharps were seen in the leads over the central regions.     ACTIVATION PROCEDURES:   Hyperventilation was performed by the patient for a total of 3 minutes. The technician performing the test noted good effort. This technique produced electroencephalographic changes in keeping with the expected bilaterally synchronous, frontally predominant, high amplitude slow waves build up.     Intermittent Photic stimulation was performed in a stepwise fashion from 1 to 30 Hz and elicited a normal response (photic driving), most noticeable in the posterior leads.      ICTAL AND/OR INTERICTAL FINDINGS:   No focal or generalized epileptiform activity noted. No regional slowing was seen during this routine study.  No clinical events or seizures were reported or recorded during the study.     EKG: sampling of the EKG recording demonstrated sinus rhythm.       INTERPRETATION:  This is a normal routine EEG recording in the awake,  drowsy/sleep state(s).  Clinical correlation is recommended.    Note: A normal EEG does not rule out epilepsy.  If the clinical suspicion remains high for seizures, a prolonged recording to capture clinical or subclinical events may be helpful.        Juan A Haque MD   Epilepsy and Neurodiagnostics.   Clinical  of Neurology [facility].   Diplomate in Neurology, Epilepsy, and Electrodiagnostic Medicine.   Office: [phone]  Fax: [phone]

## 2021-02-25 NOTE — PROGRESS NOTES
Chief Complaint   Patient presents with   • Follow-Up     Localization-related epilepsy        Problem List Items Addressed This Visit     None      Visit Diagnoses     Localization-related epilepsy (HCC)        Relevant Medications    LamoTRIgine 100 MG TABLET SR 24 HR          Interim History:  Theodora Ponce 25 y.o. female presents today for seizure f/u.     Reports of no seizures or spells. Still taking lamotrigine 100mg QHS. No side effects, no rash. Mood is good. She had done her work-up icluding EEG , MRI brain and lab work. She is taking weekly vit D. She is drving and may need DMV paperwork. She denies any events of having unilateral numbness, tingling or weakness. No eye pain or visual disturbances. She states she has intermittent numbness and swelling in her hands (R>L). She denies any family hx of MS.      Past medical history:   Past Medical History:   Diagnosis Date   • Pericarditis 05/2018   • Tonsillitis        Past surgical history:   Past Surgical History:   Procedure Laterality Date   • CHOLECYSTECTOMY  05/2018   • APPENDECTOMY  04/2018   • TONSILLECTOMY  2/24/2010    Performed by YULIYA PEDERSEN at SURGERY SAME DAY [facility]       Family history:   No family history on file.    Social history:   Social History     Socioeconomic History   • Marital status: Single     Spouse name: Not on file   • Number of children: Not on file   • Years of education: Not on file   • Highest education level: Not on file   Occupational History   • Not on file   Tobacco Use   • Smoking status: Never Smoker   • Smokeless tobacco: Never Used   Substance and Sexual Activity   • Alcohol use: No     Comment: weekends   • Drug use: No   • Sexual activity: Not on file   Other Topics Concern   • Not on file   Social History Narrative    ** Merged History Encounter **          Social Determinants of Health     Financial Resource Strain:    • Difficulty of Paying Living Expenses:    Food Insecurity:    • Worried About  Running Out of Food in the Last Year:    • Ran Out of Food in the Last Year:    Transportation Needs:    • Lack of Transportation (Medical):    • Lack of Transportation (Non-Medical):    Physical Activity:    • Days of Exercise per Week:    • Minutes of Exercise per Session:    Stress:    • Feeling of Stress :    Social Connections:    • Frequency of Communication with Friends and Family:    • Frequency of Social Gatherings with Friends and Family:    • Attends Sikhism Services:    • Active Member of Clubs or Organizations:    • Attends Club or Organization Meetings:    • Marital Status:    Intimate Partner Violence:    • Fear of Current or Ex-Partner:    • Emotionally Abused:    • Physically Abused:    • Sexually Abused:        Current medications:   Current Outpatient Medications   Medication   • lamoTRIgine (LAMICTAL) 100 MG Tab   • methocarbamol (ROBAXIN) 500 MG Tab   • PANTOPRAZOLE SODIUM PO   • Diclofenac Sodium (VOLTAREN) 1 % Gel   • ondansetron (ZOFRAN ODT) 4 MG TABLET DISPERSIBLE   • omeprazole (PRILOSEC) 20 MG delayed-release capsule   • Phenol-Phenolate Sodium (THROAT SPRAY MT)   • TYLENOL 8 HOUR PO     No current facility-administered medications for this visit.       Medication Allergy:  Allergies   Allergen Reactions   • Vicodin [Hydrocodone-Acetaminophen] Hives         Review of systems:     General: Denies fevers or chills, or nightsweats, or generalized fatigue.    Head: Denies headaches or dizziness or lightheadedness  EENT: Denies vision changes, vision loss or pain, nasal secretion, nasal bleeding, difficulty swallowing, hearing loss, tinnitus, vertigo, ear pain  Dermatologic:  Denies new rash  Musculoskeletal: Denies muscle pain or swelling, no atrophy, no neck and back pain or stiffness.   Neurologic: Denies facial droopiness, muscle weakness (focal or generalized), ataxia, change in speech or language, memory loss, abnormal movements, seizures, loss of consciousness, or episodes of  confusion.   Psychiatric: Denies anxiety, depression, mood swings, suicidal or homicidal thoughts       Physical examination:   Vitals:    03/03/21 0726   BP: 118/78   BP Location: Right arm   Patient Position: Sitting   BP Cuff Size: Adult   Pulse: 80   Resp: 12   Temp: 35.9 °C (96.7 °F)   TempSrc: Temporal   SpO2: 96%   Weight: 80 kg (176 lb 5.9 oz)     General: Patient in no acute distress, pleasant and cooperative.  HEENT: Normocephalic, no signs of acute trauma.   Neck: Supple. There is normal range of motion.   Musculoskeletal: joints exhibit full range of motion  Psychiatric: No hallucinatory behavior. No symptoms of depression or suicidal ideation. Mood and affect appear normal on exam.     NEUROLOGICAL EXAM:   Mental status, orientation: Awake, alert and fully oriented.   Speech and language: speech is clear and fluent. The patient is able to name, repeat and comprehend.   Memory: There is intact recollection of recent and remote events.   Motor exam: Strength is 5/5 in all extremities. Tone is normal. No abnormal movements were seen on exam.   Sensory exam reveals normal sense of light touch in all extremities.       ANCILLARY DATA REVIEWED:       Lab Data Review:  Reviewed in chart. CBC, CMP    Records reviewed:   Reviewed in chart.    Imaging:   MRI brain w & w/o 2/17/21  1. A couple nonspecific tiny T2 hyperintense foci in the right frontal periventricular white matter which are indeterminate and could represent nonspecific gliosis although cannot exclude tiny chronic demyelinating lesions. No abnormal enhancement.  2. No evidence of mass lesion, heterotopic gray matter, gross cortical dysplasia or mesial temporal sclerosis.    EEG:  Routine EEG 2/19/21  This is a normal routine EEG recording in the awake, drowsy/sleep   state(s).  Clinical correlation is recommended.         ASSESSMENT AND PLAN:    1. Localization-related epilepsy (HCC)  - LamoTRIgine 100 MG TABLET SR 24 HR; Take 100 mg by mouth every  bedtime.  Dispense: 30 tablet; Refill: 5    2. Vitamin D deficiency    3. Screening for depression    4. Abnormal finding on MRI of brain    5. Encounter for examination for driving license          CLINICAL DISCUSSION:  Nature of spells unknown. Epileptic vs non epileptic spells. Started in 2018 after a series of surgical procedures complicated by pericardial effusions. She initially had 2 convulsions in one night. She was started on keppra then was weaned off in  d/t side effects. She then had a breakthrough seizure in Dec 2019. She was then started on low dose lamotrigine 100mg QHS and has been spell free since. No side effects. No rash. No triggers. There was tongue biting but no incontinence. Spells were at night when she was sleeping. She remains spell free. Routine EEG normal but aware that normal EEG does not r/o epilepsy. Her MRI brain was abnormal with  nonspecific tiny T2 hyperintense foci in the right frontal periventricular white matter. Dr. Jacobs reviewed imaging and he wants to see pt to r/o MS. Pt does not have MS symptoms.      No family hx of epilepsy or MS. No TBI hx.      Not drinking alcohol. Not using alcohol or recreational drug use.      Mood is good. No suicidal or homicidal thoughts.       She is homosexual and not interested in having sexual intercourse with the opposite sex. Not planning on getting pregnant. She is aware of the risk of pregnancy complications while taking ASMs which include congenital defects, abnormal fetal growth, spontaneous , etc. She will let me know if she plans on having a baby someday and we can start folic acid.      Past ASM's: Keppra (suicidal thoughts, drowsiness, fatigue)     Current ASM's: Lamotrigine SR 100mg QHS        Plan:  -Since pt is not having further spells, we agreed not to do longer EEGs for now.     - Discussed avoidance of spell/sz triggers: alcohol, sleep deprivation, energy drinks and stress.     - Discussed Vit D  supplementation. Recommended taking 2000-5000u daily after weekly supplementation. Vit D very low (9)     - Discussed driving restrictions. Okay to drive but aware to stop driving immediately if a spell occurs and report to us.  DMV paperwork faxed and copy given to pt.      -Labs to be checked for next appointment: none        FOLLOW-UP:   Return in about 3 months (around 6/3/2021).      EDUCATION AND COUNSELING:  -Education was provided to the patient and/or family regarding diagnosis and prognosis. The chronic and unpredictable nature of the condition were discussed. There is increased risk for additional events, which may carry potential for significant injuries and death. Discussed frequent seizure triggers: sleep deprivation, medication non-compliance, use of illegal drugs/alcohol, stress, and others.   -We reviewed in detail the current antiepileptic regimen. Potential side effects of antiepileptics were discussed at length, including but no limited to: hypersensitivity reactions (rash and others, some of which can be fatal), visual field changes (some of which may be irreversible), glaucoma, diplopia, kidney stones, osteopenia/osteoporosis/bone fractures, hyperthermia/anhydrosis, hyponatremia, tremors/abnormal movements, ataxia, dizziness, fatigue, increased risk for falls, risk for cardiac arrhythmias/syncope, gastrointestinal side effects(hepatitis, pancreatitis, gastritis, ulcers), gingival hypertrophy/bleeding, drowsiness, sedation, anxiety/nervousness, increased risk for suicide, increased risk for depression, and psychosis.   -We also reviewed drug-drug interactions and their potential effect on seizure control and medication side effects.    -We also discussed in detail potential effects of seizures, epilepsy, and medications during pregnancy, including but not limited to fetal malformations, child developmental/intellectual disability, fetal/ risk for hemorrhages, stillbirth, maternal death,  premature birth, and others. The patient/family aware that pregnancy should be avoided, unless desired, in which case we recommend discussing with us at least a year prior to planned conception. To avoid undesired pregnancy while on antiepileptics, we recommend dual contraception.   -Folic acid 2 mg is recommended for all females in childbearing age (12-44 years of age).   -Recommend chronic vitamin D supplementation and regular exercise (if not contraindicated).   -Patient/family educated on risk for SUDEP (Sudden Death in Epilepsy). Counseling was provided on the importance of strict medication and follow up compliance. The patient/family understand the risks associated with non-adherence with the medical plan as outlined, including but not limited to an increased risk for breakthrough seizures, which may contribute to injuries, disability, status epilepticus, and even death.   -Counseling was also provided on potential effects of alcohol and other drugs, which may lower seizure threshold and/or affect the metabolism of antiepileptic drugs. We recommend avoidance of alcohol and illegal drugs.  -Avoid sleep deprivation.   -We extensively discussed the aspects related to safety in drivers who suffer from epilepsy. The patient is encourage to report to the Division of Motor Vehicles of any condition and/or spells related to confusion, disorientation, and/or loss of awareness and/or loss of consciousness; as these may pose a safety issue if they occur while operating a motor vehicle. The patient and/or family are ultimately responsible for exercising caution and abiding to regulations in place.   -Other seizure precautions were discussed at length, including no diving, no skydiving, no climbing or exposure to unprotected heights, no unsupervised swimming, no Jacuzzi or bathing in bathtubs or deep bodies of water. The patient/family have been advised about risks for operating any machinery while suffering from seizures  / syncope / epilepsy and/or while taking antiepileptic drugs.   -The patient understands and agrees that due to the complexity of his/her diagnosis, results of any testing and further recommendations will typically be discussed/made during a face to face encounter in my office. The patient and/or family further understands it is their responsibility to keep proper follow up.     Patient agrees with plan, as outlined.         Deandra Gipson, MSN, APRN, FNP-C  [facility]  Office: [phone]  Fax: [phone]

## 2021-02-27 LAB
25(OH)D3+25(OH)D2 SERPL-MCNC: 9.4 NG/ML (ref 30–100)
ALBUMIN SERPL-MCNC: 4.8 G/DL (ref 3.9–5)
ALBUMIN/GLOB SERPL: 2.1 {RATIO} (ref 1.2–2.2)
ALP SERPL-CCNC: 66 IU/L (ref 39–117)
ALT SERPL-CCNC: 10 IU/L (ref 0–32)
AST SERPL-CCNC: 11 IU/L (ref 0–40)
BASOPHILS # BLD AUTO: 0.1 X10E3/UL (ref 0–0.2)
BASOPHILS NFR BLD AUTO: 1 %
BILIRUB SERPL-MCNC: 0.3 MG/DL (ref 0–1.2)
BUN SERPL-MCNC: 11 MG/DL (ref 6–20)
BUN/CREAT SERPL: 16 (ref 9–23)
CALCIUM SERPL-MCNC: 9.6 MG/DL (ref 8.7–10.2)
CHLORIDE SERPL-SCNC: 105 MMOL/L (ref 96–106)
CO2 SERPL-SCNC: 21 MMOL/L (ref 20–29)
CREAT SERPL-MCNC: 0.67 MG/DL (ref 0.57–1)
EOSINOPHIL # BLD AUTO: 0.1 X10E3/UL (ref 0–0.4)
EOSINOPHIL NFR BLD AUTO: 1 %
ERYTHROCYTE [DISTWIDTH] IN BLOOD BY AUTOMATED COUNT: 12.7 % (ref 11.7–15.4)
GLOBULIN SER CALC-MCNC: 2.3 G/DL (ref 1.5–4.5)
GLUCOSE SERPL-MCNC: 92 MG/DL (ref 65–99)
HCT VFR BLD AUTO: 46.4 % (ref 34–46.6)
HGB BLD-MCNC: 15.3 G/DL (ref 11.1–15.9)
IMM GRANULOCYTES # BLD AUTO: 0 X10E3/UL (ref 0–0.1)
IMM GRANULOCYTES NFR BLD AUTO: 0 %
IMMATURE CELLS  115398: NORMAL
LYMPHOCYTES # BLD AUTO: 2.6 X10E3/UL (ref 0.7–3.1)
LYMPHOCYTES NFR BLD AUTO: 26 %
MCH RBC QN AUTO: 29.6 PG (ref 26.6–33)
MCHC RBC AUTO-ENTMCNC: 33 G/DL (ref 31.5–35.7)
MCV RBC AUTO: 90 FL (ref 79–97)
MONOCYTES # BLD AUTO: 0.7 X10E3/UL (ref 0.1–0.9)
MONOCYTES NFR BLD AUTO: 7 %
MORPHOLOGY BLD-IMP: NORMAL
NEUTROPHILS # BLD AUTO: 6.7 X10E3/UL (ref 1.4–7)
NEUTROPHILS NFR BLD AUTO: 65 %
NRBC BLD AUTO-RTO: NORMAL %
PLATELET # BLD AUTO: 294 X10E3/UL (ref 150–450)
POTASSIUM SERPL-SCNC: 4.3 MMOL/L (ref 3.5–5.2)
PROT SERPL-MCNC: 7.1 G/DL (ref 6–8.5)
RBC # BLD AUTO: 5.17 X10E6/UL (ref 3.77–5.28)
SODIUM SERPL-SCNC: 140 MMOL/L (ref 134–144)
WBC # BLD AUTO: 10.1 X10E3/UL (ref 3.4–10.8)

## 2021-03-02 ENCOUNTER — TELEPHONE (OUTPATIENT)
Dept: NEUROLOGY | Facility: MEDICAL CENTER | Age: 26
End: 2021-03-02

## 2021-03-02 DIAGNOSIS — E55.9 VITAMIN D DEFICIENCY: ICD-10-CM

## 2021-03-02 RX ORDER — ERGOCALCIFEROL 1.25 MG/1
50000 CAPSULE ORAL
Qty: 4 CAPSULE | Refills: 5 | Status: SHIPPED | OUTPATIENT
Start: 2021-03-02 | End: 2021-11-09

## 2021-03-02 NOTE — TELEPHONE ENCOUNTER
Please notify pt to stop taking daily OTC vit D. Rx sent to pharmacy. Take 1 cap weekly for 6 months. Thanks. Vit D very very low.     ARABELLA       Pt verbally understood information given.    Polo

## 2021-03-03 ENCOUNTER — OFFICE VISIT (OUTPATIENT)
Dept: NEUROLOGY | Facility: MEDICAL CENTER | Age: 26
End: 2021-03-03
Attending: NURSE PRACTITIONER
Payer: COMMERCIAL

## 2021-03-03 VITALS
WEIGHT: 176.37 LBS | SYSTOLIC BLOOD PRESSURE: 118 MMHG | HEART RATE: 80 BPM | DIASTOLIC BLOOD PRESSURE: 78 MMHG | TEMPERATURE: 96.7 F | BODY MASS INDEX: 31.24 KG/M2 | OXYGEN SATURATION: 96 % | RESPIRATION RATE: 12 BRPM

## 2021-03-03 DIAGNOSIS — G40.109 LOCALIZATION-RELATED EPILEPSY (HCC): ICD-10-CM

## 2021-03-03 DIAGNOSIS — E55.9 VITAMIN D DEFICIENCY: ICD-10-CM

## 2021-03-03 DIAGNOSIS — Z02.4 ENCOUNTER FOR EXAMINATION FOR DRIVING LICENSE: ICD-10-CM

## 2021-03-03 DIAGNOSIS — R90.89 ABNORMAL FINDING ON MRI OF BRAIN: ICD-10-CM

## 2021-03-03 DIAGNOSIS — Z13.31 SCREENING FOR DEPRESSION: ICD-10-CM

## 2021-03-03 PROCEDURE — 99212 OFFICE O/P EST SF 10 MIN: CPT | Performed by: NURSE PRACTITIONER

## 2021-03-03 PROCEDURE — 99214 OFFICE O/P EST MOD 30 MIN: CPT | Performed by: NURSE PRACTITIONER

## 2021-03-03 RX ORDER — LAMOTRIGINE 100 MG/1
100 TABLET, EXTENDED RELEASE ORAL
Qty: 30 TABLET | Refills: 5 | Status: SHIPPED | OUTPATIENT
Start: 2021-03-03 | End: 2021-10-05 | Stop reason: SDUPTHER

## 2021-03-03 ASSESSMENT — FIBROSIS 4 INDEX: FIB4 SCORE: 0.31

## 2021-03-17 ENCOUNTER — OFFICE VISIT (OUTPATIENT)
Dept: NEUROLOGY | Facility: MEDICAL CENTER | Age: 26
End: 2021-03-17
Attending: NURSE PRACTITIONER
Payer: COMMERCIAL

## 2021-03-17 ENCOUNTER — HOSPITAL ENCOUNTER (OUTPATIENT)
Dept: LAB | Facility: MEDICAL CENTER | Age: 26
End: 2021-03-17
Attending: PSYCHIATRY & NEUROLOGY
Payer: COMMERCIAL

## 2021-03-17 VITALS
SYSTOLIC BLOOD PRESSURE: 116 MMHG | RESPIRATION RATE: 12 BRPM | TEMPERATURE: 95 F | DIASTOLIC BLOOD PRESSURE: 68 MMHG | HEART RATE: 120 BPM | OXYGEN SATURATION: 96 %

## 2021-03-17 DIAGNOSIS — R90.89 ABNORMAL FINDING ON MRI OF BRAIN: Primary | ICD-10-CM

## 2021-03-17 DIAGNOSIS — R90.89 ABNORMAL FINDING ON MRI OF BRAIN: ICD-10-CM

## 2021-03-17 PROCEDURE — 86235 NUCLEAR ANTIGEN ANTIBODY: CPT

## 2021-03-17 PROCEDURE — 86038 ANTINUCLEAR ANTIBODIES: CPT

## 2021-03-17 PROCEDURE — 86225 DNA ANTIBODY NATIVE: CPT

## 2021-03-17 PROCEDURE — 36415 COLL VENOUS BLD VENIPUNCTURE: CPT

## 2021-03-17 PROCEDURE — 84443 ASSAY THYROID STIM HORMONE: CPT

## 2021-03-17 PROCEDURE — 99205 OFFICE O/P NEW HI 60 MIN: CPT | Performed by: PSYCHIATRY & NEUROLOGY

## 2021-03-17 PROCEDURE — 82607 VITAMIN B-12: CPT

## 2021-03-17 NOTE — PROGRESS NOTES
[facility]  MULTIPLE SCLEROSIS & NEUROIMMUNOLOGY  NEW PATIENT VISIT    Referral source: JEM Sol    DISEASE SUMMARY:  Principal neurologic diagnosis: RIS  Diagnosis of MS: n/a  Disease History:  - no clinical events convincing for relapse  Disease course at onset: RIS  Current disease course: RIS  Previous disease therapies:  - none  Current disease therapies:  - none  Symptomatic therapies:  - none  CSF:  - not sampled  Other Testing:  - none  MRI head:  - 2/17/2021: two bud-ventricular lesions without enhancement  MRI cervical spine:  -   MRI thoracic spine:  -     CC: abnormal MRI brain    HISTORY OF ILLNESS:  Theodora Ponce is a 25 y.o. woman with a history most notable for pericarditis.  Today, she was accompanied by her wife, and she provided the following history:    2018:  Theodora developed appendicitis, then pleural effusion, then cholecystitis, and finally pericarditis.    8/2018:  Theodora had two seizures, and she was started on levetiracetam.    2019:  Theodora started experiencing episodes of left hand numbness.  The numbness would be present for ~2 hours at a time.  Shaking the hand relieved symptoms.    12/2019:  Theodora had an additional seizure, and she was switched to lamotrigine.    2020:  Theodora noticed \"eye fatigue.\"  Her vision became blurry, and this was relieved with blinking.  She never experienced loss of vision or pain with eye movements.    12/2020:  Theodora noticed numbness involving the lips.  When she pressed the lips together they felt better.  The numbness would be present for ~2 hours at a time, and then it resolved.    Otherwise, Theodora often becomes lightheaded.  She feels like she might faint, and she feels \"hot.\"    A review of MS-related symptoms was notable for the following:    Fatigue: yes; yes, attributed to medications (gabapentin, levetiracetam)  Weakness: no  Numbness: yes; described above  Incoordination: no  Spasms/Spasticity: yes; two months ago there was a few days of " spasms involving the left thigh  Vision Impairment: yes; described above  Walking/Balance Problems: no  Neuralgia: yes; occasionally involving the hips and the lower extremities; attributed to sciatica  Bowel Symptoms: yes; she has struggled with diarrhea since 2018; there was a fecal accident 8/2020; occasional constipation  Bladder Symptoms: yes; there is some frquency (5-6 times/24 horus)  Heat Sensitivity: no  Depression: yes; improved on lamotrigine  Cognitive/Memory Problems: yes; she sometimes has agnosia regarding work responsibilities which resolves spontaneously  Sexual Dysfunction: no  Anxiety: yes; related to medical conditions    MEDICAL AND SURGICAL HISTORY:  Past Medical History:   Diagnosis Date   • Pericarditis 05/2018   • Tonsillitis      Past Surgical History:   Procedure Laterality Date   • CHOLECYSTECTOMY  05/2018   • APPENDECTOMY  04/2018   • TONSILLECTOMY  2/24/2010    Performed by YLUIYA PEDERSEN at SURGERY SAME DAY [facility]     MEDICATIONS:  Current Outpatient Medications   Medication Sig   • LamoTRIgine 100 MG TABLET SR 24 HR Take 100 mg by mouth every bedtime.   • ergocalciferol (DRISDOL) 95894 UNIT capsule Take 1 capsule by mouth every 7 days.   • methocarbamol (ROBAXIN) 500 MG Tab Take  by mouth 4 times a day.   • Diclofenac Sodium (VOLTAREN) 1 % Gel Apply 4 g to skin as directed 3 times a day as needed.   • Phenol-Phenolate Sodium (THROAT SPRAY MT) Spray  in mouth/throat.   • TYLENOL 8 HOUR PO Take  by mouth.     SOCIAL HISTORY:  Social History     Tobacco Use   • Smoking status: Never Smoker   • Smokeless tobacco: Never Used   Substance Use Topics   • Alcohol use: No     Comment: weekends     Social History     Social History Narrative    ** Merged History Encounter **          FAMILY HISTORY:  History reviewed. No pertinent family history.     REVIEW OF SYSTEMS:  A ROS was completed.  Pertinent positives and negatives were included in the HPI, above.  All other systems were  "reviewed and are negative.    PHYSICAL EXAM:  General/Medical:  - NAD  - hair, skin, nails, and joints were normal  - neck was supple without Lhermitte's phenomenon    Neuro:  MENTAL STATUS: awake and alert; no deficits of speech or language; oriented to person, place, and time; affect was appropriate to situation; pleasant, cooperative    CRANIAL NERVES:    II: acuity was: J1/J1; fields intact to confrontation; pupils 3/3 to 2/2 without a relative afferent pupillary defect; discs sharp    III/IV/VI: versions intact without nystagmus    V: facial sensation symmetric to light touch    VII: facial expression symmetric    VIII: hearing intact to finger rub    IX/X: palate elevates symmetrically    XI: shoulder shrug symmetric    XII: tongue midline    MOTOR:  - bulk and tone were normal throughout  Upper Extremity Strength  (R/L)    5/5   Elbow flexion 5/5   Elbow extension 5/5   Shoulder abduction 5/5     Lower Extremity Strength  (R/L)   Hip flexion 5/5   Knee extension 5/5   Knee flexion 5/5   Ankle plantarflexion 5/5   Ankle dorsiflexion 5/5     - can walk on toes and heels  - no pronator drift; no abnormal movements    SENSATION:  - light touch: grossly intact over the upper and lower extremities  - vibration (R/L, seconds): 11/11 at the great toes  - pinprick: NT  - proprioception: NT  - Romberg: absent    COORDINATION:  - finger to nose was normal, no ataxia on exam  - finger tapping was rapid and accurate, bilaterally    REFLEXES:  Reflex Right Left   BR 2+ 2+   Biceps 2+ 2+   Triceps 2+ 2+   Patellae 2+ 2+   Achilles 2+ 2+   Toes down down     GAIT:  - narrow base and normal  - heel-raised and toe-raised gait: intact  - tandem gait: intact    QUANTITATIVE SCORES:  Timed 25-foot walk (sec): 4.2.  Assistive device: none    REVIEW OF IMAGING STUDIES: I reviewed the following studies:  MRI Brain:  Date: 2/17/2021  W/o and w/ contrast?: yes  Indication: \"seizure\"  Comparison: none  Impression:  \"1) A couple " "nonspecific tiny T2 hyperintense foci in the right frontal periventricular white matter which are indeterminate and could represent nonspecific gliosis although cannot exclude tiny chronic demyelinating lesions. No abnormal enhancement.  2) No evidence of mass lesion, heterotopic gray matter, gross cortical dysplasia or mesial temporal sclerosis.\"    REVIEW OF LABORATORY STUDIES:  No data available for review.    ASSESSMENT:  Theodora Ponce is a 25 y.o. woman with abnormal MRI brain (radiologically isolated syndrome).  Based on the history above, Theodora has not had any clinical events involving neurologic symptoms which follow the expected time course for multiple sclerosis.  We agreed upon limited additional workup including a screen for MS mimics (labs listed below) and MRI cervical and thoracic spine.  We discussed the possibility of lumbar puncture for CSF analysis (testing for oligoclonal bands), but we will hold off for now.  We will follow up in approximately 4 months.  In the meantime, Theodora will alert the clinic if she develops additional neurologic symptoms.    PLAN:  Abnormal MRI Brain (Radiologically Isolated Syndrome)  Orders Placed This Encounter   • MR-CERVICAL SPINE-WITH & W/O   • MR-THORACIC SPINE-WITH & W/O   • SSA 52 AND 60 (RO)(YORDAN) AB, IGG   • SSB(LA)(YORDAN)IGG AB   • DSDNA IGG TITER BY IFA (REFLEX)   • TSH   • VITAMIN B12   • KIYA REFLEXIVE PROFILE     Follow-Up:  - Return in about 4 months (around 7/17/2021).    Signed: Toni Jacobs M.D. at 8:48 AM on 03/17/21    BILLING DOCUMENTATION:   I spent 60 minutes reviewing the medical record, interviewing and examining the patient, discussing my impression (see \"assessment\" above), and coordinating care.  "

## 2021-03-19 LAB
DSDNA AB TITR SER CLIF: NORMAL {TITER}
NUCLEAR IGG SER QL IA: NORMAL

## 2021-03-20 LAB
ENA SS-B IGG SER IA-ACNC: 0 AU/ML (ref 0–40)
SSA52 R0ENA AB IGG Q0420: 0 AU/ML (ref 0–40)
SSA60 R0ENA AB IGG Q0419: 0 AU/ML (ref 0–40)

## 2021-03-22 LAB
TSH SERPL DL<=0.005 MIU/L-ACNC: 1.4 UIU/ML (ref 0.38–5.33)
VIT B12 SERPL-MCNC: 340 PG/ML (ref 211–911)

## 2021-04-06 ENCOUNTER — HOSPITAL ENCOUNTER (OUTPATIENT)
Dept: RADIOLOGY | Facility: MEDICAL CENTER | Age: 26
End: 2021-04-06
Attending: PSYCHIATRY & NEUROLOGY
Payer: COMMERCIAL

## 2021-04-06 DIAGNOSIS — R90.89 ABNORMAL FINDING ON MRI OF BRAIN: ICD-10-CM

## 2021-04-06 PROCEDURE — 72157 MRI CHEST SPINE W/O & W/DYE: CPT

## 2021-04-06 PROCEDURE — 700117 HCHG RX CONTRAST REV CODE 255: Performed by: PSYCHIATRY & NEUROLOGY

## 2021-04-06 PROCEDURE — A9576 INJ PROHANCE MULTIPACK: HCPCS | Performed by: PSYCHIATRY & NEUROLOGY

## 2021-04-06 PROCEDURE — 72156 MRI NECK SPINE W/O & W/DYE: CPT

## 2021-04-06 RX ADMIN — GADOTERIDOL 15 ML: 279.3 INJECTION, SOLUTION INTRAVENOUS at 15:25

## 2021-04-19 ENCOUNTER — APPOINTMENT (OUTPATIENT)
Dept: RADIOLOGY | Facility: MEDICAL CENTER | Age: 26
End: 2021-04-19
Attending: EMERGENCY MEDICINE
Payer: COMMERCIAL

## 2021-04-19 ENCOUNTER — HOSPITAL ENCOUNTER (EMERGENCY)
Facility: MEDICAL CENTER | Age: 26
End: 2021-04-19
Attending: EMERGENCY MEDICINE
Payer: COMMERCIAL

## 2021-04-19 VITALS
TEMPERATURE: 97.2 F | RESPIRATION RATE: 18 BRPM | HEART RATE: 70 BPM | OXYGEN SATURATION: 96 % | BODY MASS INDEX: 31.91 KG/M2 | DIASTOLIC BLOOD PRESSURE: 62 MMHG | WEIGHT: 180.12 LBS | HEIGHT: 63 IN | SYSTOLIC BLOOD PRESSURE: 102 MMHG

## 2021-04-19 DIAGNOSIS — M79.601 RIGHT ARM PAIN: ICD-10-CM

## 2021-04-19 PROCEDURE — 99283 EMERGENCY DEPT VISIT LOW MDM: CPT

## 2021-04-19 PROCEDURE — 73060 X-RAY EXAM OF HUMERUS: CPT | Mod: RT

## 2021-04-19 PROCEDURE — 93971 EXTREMITY STUDY: CPT | Mod: RT

## 2021-04-19 ASSESSMENT — FIBROSIS 4 INDEX: FIB4 SCORE: 0.31

## 2021-04-20 NOTE — ED TRIAGE NOTES
Right medial upper arm pain started this afternoon without injury.  Radiates to shoulder.  No discoloration, swelling.  Denies any hormonal birth control use.

## 2021-04-20 NOTE — ED NOTES
The patient has been provided with discharge education and information.  The patient was also provided with instructions on follow up care and return precautions.  The patient verbalizes understanding of discharge instructions, follow up care, and return precautions.  All questions have been answered.  NAD, A/Ox4, good color and appropriate at time of discharge.  Patient ambulated steady gait out of department.      Sling applied. Educated on use verbalized understanding.

## 2021-04-20 NOTE — ED PROVIDER NOTES
ED Provider Note    Scribed for Oumar Baez M.D. by Hugo Burk. 4/19/2021, 6:59 PM.    Primary care provider: TOMMY Draper  Means of arrival: Walk-in  History obtained from: Patient  History limited by: None    CHIEF COMPLAINT  Chief Complaint   Patient presents with    Arm Pain       HPI  Theodora Ponce is a 25 y.o. female who presents to the Emergency Department for acute, worsening right upper arm pain onset this morning. Patient states this morning she noticed a new pain to her arm which has progressively worsened throughout the day. She reports that her arm feels mildly swollen, but denies any redness, recent injuries, or redness. Her pain radiates to her shoulder and is exacerbates with movement of the arm. She sleeps on her side. Denies currently being on birth control. She has a history of seizures and pericarditis.      REVIEW OF SYSTEMS  See HPI for further details. All other systems reviewed and negative.      PAST MEDICAL HISTORY   has a past medical history of Pericarditis (05/2018) and Tonsillitis.    SURGICAL HISTORY   has a past surgical history that includes tonsillectomy (2/24/2010); appendectomy (04/2018); and cholecystectomy (05/2018).    SOCIAL HISTORY  Social History     Tobacco Use    Smoking status: Never Smoker    Smokeless tobacco: Never Used   Substance Use Topics    Alcohol use: No     Comment: weekends    Drug use: No      Social History     Substance and Sexual Activity   Drug Use No       FAMILY HISTORY  History reviewed. No pertinent family history.    CURRENT MEDICATIONS  Current Outpatient Medications   Medication Instructions    Diclofenac Sodium (VOLTAREN) 4 g, Transdermal, 3 TIMES DAILY PRN    ergocalciferol (DRISDOL) 50,000 Units, Oral, EVERY 7 DAYS    LamoTRIgine 100 mg, Oral, EVERY BEDTIME    methocarbamol (ROBAXIN) 500 MG Tab Oral, 4 TIMES DAILY    Phenol-Phenolate Sodium (THROAT SPRAY MT) Spray  in mouth/throat.    TYLENOL 8 HOUR PO Take  by mouth.  "        ALLERGIES  Allergies   Allergen Reactions    Vicodin [Hydrocodone-Acetaminophen] Hives       PHYSICAL EXAM  VITAL SIGNS: /88   Pulse 94   Temp 36.1 °C (97 °F) (Temporal)   Resp 18   Ht 1.6 m (5' 3\")   Wt 81.7 kg (180 lb 1.9 oz)   LMP 04/01/2021   SpO2 97%   BMI 31.91 kg/m²     Constitutional: Well developed, Well nourished, No acute distress, Non-toxic appearance.   Cardiovascular: Regular rate and rhythm without murmurs gallops or rubs.   Thorax & Lungs: No respiratory distress. Lungs are clear to auscultation bilaterally, there are no wheezes no rales. Chest wall is nontender.  Abdomen: Soft, nontender nondistended. Bowel sounds are present.   Skin: Warm, Dry, No erythema,   Musculoskeletal: Tenderness around the medial aspect of the right brachium, no fullness, normal bicepes, normal triceps. Increased pain with adduction of the arm. Intact distal pulses, no clubbing, no cyanosis, no edema.  Neurologic: Alert & oriented x 3, Normal motor function, Normal sensory function, No focal deficits noted.     RADIOLOGY  US-EXTREMITY VENOUS UPPER UNILAT RIGHT   Final Result      DX-HUMERUS 2+ RIGHT   Final Result      No acute fracture or other abnormality is identified.        The radiologist's interpretation of all radiological studies have been reviewed by me.    COURSE & MEDICAL DECISION MAKING  Nursing notes, VS, PMSFHx reviewed in chart.    6:59 PM - Patient seen and examined at bedside. Ordered DX_humerus right and US extremity venous upper unilat right to evaluate her symptoms. Differential diagnoses include but are not limited to: DVT vs muscular injury.    9:26 PM - Patient was reevaluated at bedside. Discussed radiology results with the patient and informed them they are reassuring. Recommended she use ice and NSAID's to manager her symptoms. Return precautions were discussed with the patient, and they were cleared for discharge at this time. Patient was understanding and agreeable to " discharge.     Medical Decision Making:  Patient presents for evaluation.  Clinically the patient is tender about the medial aspect of the upper arm there is no significant edema no cord.  At this point we are concerned because of her other medical problems is for DVT in the upper extremity for possible humeral bone involvement.  X-ray shows no signs of abnormalities.  Ultrasound shows no signs of DVT.  At this point it could very well just be a muscular injury contusion.  Recommended ice elevation range of motion follow-up the primary care physician for recheck in 1 week return as needed    The patient will return for new or worsening symptoms and is stable at the time of discharge.    The patient is referred to a primary physician for blood pressure management, diabetic screening, and for all other preventative health concerns.    DISPOSITION:  Patient will be discharged home in stable condition.    FOLLOW UP:  TOMMY Draper [address]  [address] [address]  [phone]    Schedule an appointment as soon as possible for a visit in 1 week  For re-check, Return if any symptoms worsen      OUTPATIENT MEDICATIONS:  Discharge Medication List as of 4/19/2021  9:55 PM            FINAL IMPRESSION  1. Right arm pain          Hugo RAM (Mukeshibcarmen), am scribing for, and in the presence of, Oumar Baez M.D..    Electronically signed by: Hugo Burk (Moisés), 4/19/2021    Oumar RAM M.D. personally performed the services described in this documentation, as scribed by Hugo Burk in my presence, and it is both accurate and complete.    The note accurately reflects work and decisions made by me.  Oumar Baez M.D.  4/20/2021  1:28 AM

## 2021-05-30 ENCOUNTER — HOSPITAL ENCOUNTER (EMERGENCY)
Facility: MEDICAL CENTER | Age: 26
End: 2021-05-30
Attending: EMERGENCY MEDICINE
Payer: COMMERCIAL

## 2021-05-30 ENCOUNTER — APPOINTMENT (OUTPATIENT)
Dept: RADIOLOGY | Facility: MEDICAL CENTER | Age: 26
End: 2021-05-30
Attending: EMERGENCY MEDICINE
Payer: COMMERCIAL

## 2021-05-30 VITALS
TEMPERATURE: 97.2 F | DIASTOLIC BLOOD PRESSURE: 80 MMHG | BODY MASS INDEX: 31.01 KG/M2 | RESPIRATION RATE: 14 BRPM | OXYGEN SATURATION: 94 % | HEIGHT: 63 IN | HEART RATE: 56 BPM | SYSTOLIC BLOOD PRESSURE: 119 MMHG | WEIGHT: 175 LBS

## 2021-05-30 DIAGNOSIS — S60.222A CONTUSION OF LEFT HAND, INITIAL ENCOUNTER: ICD-10-CM

## 2021-05-30 PROCEDURE — 302874 HCHG BANDAGE ACE 2 OR 3""

## 2021-05-30 PROCEDURE — 73130 X-RAY EXAM OF HAND: CPT | Mod: LT

## 2021-05-30 PROCEDURE — 99283 EMERGENCY DEPT VISIT LOW MDM: CPT

## 2021-05-30 PROCEDURE — 29125 APPL SHORT ARM SPLINT STATIC: CPT

## 2021-05-30 ASSESSMENT — LIFESTYLE VARIABLES: DO YOU DRINK ALCOHOL: NO

## 2021-05-30 ASSESSMENT — FIBROSIS 4 INDEX: FIB4 SCORE: 0.31

## 2021-05-30 NOTE — Clinical Note
Theodora Ponce was seen and treated in our emergency department on 5/30/2021.  She may return to work on 05/31/2021.  Limited lifting / use of left hand x1wk     If you have any questions or concerns, please don't hesitate to call.      Jose Nunes M.D.

## 2021-05-30 NOTE — ED TRIAGE NOTES
"Chief Complaint   Patient presents with   • Hand Pain     Pt ambulatory to [facility] for above complaint. Pt reports she was tubing yesterday when she fell out of her tube and her L hand got caught in the handle. Pt states she \"felt a pop in my shoulder and my hand swelled up immediately\". Pt also reports L shoulder pain as well.     /80   Pulse (!) 56   Temp 36.2 °C (97.2 °F) (Temporal)   Resp 14   Ht 1.6 m (5' 3\")   Wt 79.4 kg (175 lb)   SpO2 94%   BMI 31.00 kg/m²     "

## 2021-05-30 NOTE — ED PROVIDER NOTES
"CHIEF COMPLAINT  Chief Complaint   Patient presents with   • Hand Pain       HPI  Theodora Ponce is a 25 y.o. female who presents with left hand pain that occurred from a traumatic incident yesterday.  She states that she was on a tube and fell off and her hand twisted.  She states within 30 seconds her hand blew up\".  She also felt a pop in her left shoulder but that seems to be okay at this point.  She denies any head or neck injury.  No other injuries.  She does note some tingling in her hand-she states the swelling has gone down somewhat but it is mostly swollen on the top of the hand.  No forearm or elbow pain.    REVIEW OF SYSTEMS  No head or neck trauma, positive paresthesias in the hand    PAST MEDICAL HISTORY  Past Medical History:   Diagnosis Date   • Pericarditis 05/2018   • Seizure disorder (HCC)    • Tonsillitis        FAMILY HISTORY  History reviewed. No pertinent family history.    SOCIAL HISTORY  Social History     Socioeconomic History   • Marital status: Single     Spouse name: Not on file   • Number of children: Not on file   • Years of education: Not on file   • Highest education level: Not on file   Occupational History   • Not on file   Tobacco Use   • Smoking status: Never Smoker   • Smokeless tobacco: Never Used   Vaping Use   • Vaping Use: Never used   Substance and Sexual Activity   • Alcohol use: No     Comment: weekends   • Drug use: No   • Sexual activity: Not on file   Other Topics Concern   • Not on file   Social History Narrative    ** Merged History Encounter **          Social Determinants of Health     Financial Resource Strain:    • Difficulty of Paying Living Expenses:    Food Insecurity:    • Worried About Running Out of Food in the Last Year:    • Ran Out of Food in the Last Year:    Transportation Needs:    • Lack of Transportation (Medical):    • Lack of Transportation (Non-Medical):    Physical Activity:    • Days of Exercise per Week:    • Minutes of Exercise per Session: " "   Stress:    • Feeling of Stress :    Social Connections:    • Frequency of Communication with Friends and Family:    • Frequency of Social Gatherings with Friends and Family:    • Attends Yazdanism Services:    • Active Member of Clubs or Organizations:    • Attends Club or Organization Meetings:    • Marital Status:    Intimate Partner Violence:    • Fear of Current or Ex-Partner:    • Emotionally Abused:    • Physically Abused:    • Sexually Abused:        SURGICAL HISTORY  Past Surgical History:   Procedure Laterality Date   • CHOLECYSTECTOMY  05/2018   • APPENDECTOMY  04/2018   • TONSILLECTOMY  2/24/2010    Performed by YULIYA PEDERSEN at SURGERY SAME DAY [facility]S       CURRENT MEDICATIONS  Home Medications    **Home medications have not yet been reviewed for this encounter**         ALLERGIES  Allergies   Allergen Reactions   • Vicodin [Hydrocodone-Acetaminophen] Hives       PHYSICAL EXAM  VITAL SIGNS: /80   Pulse (!) 56   Temp 36.2 °C (97.2 °F) (Temporal)   Resp 14   Ht 1.6 m (5' 3\")   Wt 79.4 kg (175 lb)   SpO2 94%   BMI 31.00 kg/m²      Constitutional: Well developed, Well nourished, No acute distress, Non-toxic appearance.   HENT: Normocephalic, Atraumatic  Cardiovascular: Regular pulse  Lungs: No respiratory distress  Skin: Warm, Dry, no rash  Extremities: There is soft tissue swelling and ecchymosis of the dorsum of the left hand, patient is able to flex and extend all digits, cap refill is normal, radial and ulnar pulses are 2+, sensation is grossly intact in all digits, forearm is nontender, there is no tenderness at the wrist and specifically none in the anatomical snuffbox, elbow is nontender and shoulder is nontender to palpation-elbow demonstrates full range of motion, compartments are all soft  Neurologic: Alert, appropriate, follows commands  Psychiatric: Affect normal    RADIOLOGY/PROCEDURES  DX-HAND 3+ LEFT   Final Result      1.  No evidence of acute fracture or " "dislocation.   2.  Soft tissue swelling of the dorsal hand.            COURSE & MEDICAL DECISION MAKING  Pertinent Labs & Imaging studies reviewed. (See chart for details)  This is a 25-year-old who presents after she injured her hand while \"tubing\".  There is no evidence of fracture.  Patient is neurovascularly intact.  She will be placed in a volar splint and follow-up in 1 week for recheck.    FINAL IMPRESSION  1.  Hand contusion  2.   3.         Electronically signed by: Jose Nunes M.D., 5/30/2021 10:57 AM      "

## 2021-05-30 NOTE — ED NOTES
Pt discharged to home. Pt was given follow up instructions. Pt verbalized understanding of all instructions for discharge and is ambulatory out of ED with steady gait. AOx4

## 2021-06-01 NOTE — PROGRESS NOTES
Chief Complaint   Patient presents with   • Follow-Up     Localization-related epilepsy        Problem List Items Addressed This Visit     Obesity (BMI 30-39.9)    Relevant Orders    Patient identified as having weight management issue.  Appropriate orders and counseling given.    REFERRAL TO [facility] IMPROVEMENT PROGRAMS (HIP)    REFERRAL TO NUTRITION SERVICES          Interim History:  Theodora Ponce 25 y.o. female presents today with wife for seizure f/u.     Pt reports of no spell or seizures. Still on lamotrigine ER 100mg QHS. No side effcts. No rash. Mood is good. She is taking Vit D weekly and is having nausea after taking this. Pt not sure if it's related to vit D. She is not driving yet as she needs to be seen at the [facility]. Will need clearance again. She had seen Dr. Jacobs for the abnormality in MRI brain and they have set a plan for this as far as r/o MS. No other issues.        Past medical history:   Past Medical History:   Diagnosis Date   • Pericarditis 05/2018   • Seizure disorder (HCC)    • Tonsillitis        Past surgical history:   Past Surgical History:   Procedure Laterality Date   • CHOLECYSTECTOMY  05/2018   • APPENDECTOMY  04/2018   • TONSILLECTOMY  2/24/2010    Performed by YULIYA PEDERSEN at SURGERY SAME DAY [facility]       Family history:   History reviewed. No pertinent family history.    Social history:   Social History     Socioeconomic History   • Marital status: Single     Spouse name: Not on file   • Number of children: Not on file   • Years of education: Not on file   • Highest education level: Not on file   Occupational History   • Not on file   Tobacco Use   • Smoking status: Never Smoker   • Smokeless tobacco: Never Used   Vaping Use   • Vaping Use: Never used   Substance and Sexual Activity   • Alcohol use: No     Comment: weekends   • Drug use: No   • Sexual activity: Not on file   Other Topics Concern   • Not on file   Social History Narrative    ** Merged History  Encounter **          Social Determinants of Health     Financial Resource Strain:    • Difficulty of Paying Living Expenses:    Food Insecurity:    • Worried About Running Out of Food in the Last Year:    • Ran Out of Food in the Last Year:    Transportation Needs:    • Lack of Transportation (Medical):    • Lack of Transportation (Non-Medical):    Physical Activity:    • Days of Exercise per Week:    • Minutes of Exercise per Session:    Stress:    • Feeling of Stress :    Social Connections:    • Frequency of Communication with Friends and Family:    • Frequency of Social Gatherings with Friends and Family:    • Attends Confucianism Services:    • Active Member of Clubs or Organizations:    • Attends Club or Organization Meetings:    • Marital Status:    Intimate Partner Violence:    • Fear of Current or Ex-Partner:    • Emotionally Abused:    • Physically Abused:    • Sexually Abused:        Current medications:   Current Outpatient Medications   Medication   • LamoTRIgine 100 MG TABLET SR 24 HR   • ergocalciferol (DRISDOL) 10099 UNIT capsule   • methocarbamol (ROBAXIN) 500 MG Tab   • Diclofenac Sodium (VOLTAREN) 1 % Gel   • Phenol-Phenolate Sodium (THROAT SPRAY MT)   • TYLENOL 8 HOUR PO     No current facility-administered medications for this visit.       Medication Allergy:  Allergies   Allergen Reactions   • Vicodin [Hydrocodone-Acetaminophen] Hives         Review of systems:     General: Denies fevers or chills, or nightsweats, or generalized fatigue.    Head: Denies headaches or dizziness or lightheadedness  Musculoskeletal: Denies muscle pain or swelling, no atrophy, no neck and back pain or stiffness.   Neurologic: Denies facial droopiness, muscle weakness (focal or generalized), paresthesias, ataxia, change in speech or language, memory loss, abnormal movements,. Hx of seizures  Psychiatric: Denies anxiety, depression, mood swings, suicidal or homicidal thoughts       Physical examination:   Vitals:     "06/09/21 0725   BP: 120/72   BP Location: Right arm   Patient Position: Sitting   BP Cuff Size: Adult   Pulse: 78   Resp: 12   Temp: 35.8 °C (96.5 °F)   TempSrc: Temporal   SpO2: 96%   Weight: 82.6 kg (182 lb)   Height: 1.6 m (5' 3\")     General: Patient in no acute distress, pleasant and cooperative.  HEENT: Normocephalic, no signs of acute trauma.   Neck: Supple. There is normal range of motion.   Musculoskeletal: joints exhibit full range of motion  Psychiatric: No hallucinatory behavior. No symptoms of depression or suicidal ideation. Mood and affect appear normal on exam.     NEUROLOGICAL EXAM:   Mental status, orientation: Awake, alert and fully oriented.   Speech and language: speech is clear and fluent. The patient is able to name, repeat and comprehend.   Memory: There is intact recollection of recent and remote events.   Motor exam: Strength is 5/5 in all extremities. Tone is normal. No abnormal movements were seen on exam.   Sensory exam reveals normal sense of light touch in all extremities.   Deep tendon reflexes:  2+ throughout. Plantar responses are flexor. There is no clonus.   Gait: The patient was able to get up from seated position on first attempt without requiring assistance. Found to be steady when walking.    ANCILLARY DATA REVIEWED:       Lab Data Review:  Reviewed in chart.     Records reviewed:   Reviewed in chart.    Imaging:   MRI brain w & w/o 2/17/21  1. A couple nonspecific tiny T2 hyperintense foci in the right frontal periventricular white matter which are indeterminate and could represent nonspecific gliosis although cannot exclude tiny chronic demyelinating lesions. No abnormal enhancement.  2. No evidence of mass lesion, heterotopic gray matter, gross cortical dysplasia or mesial temporal sclerosis.     EEG:  Routine EEG 2/19/21  This is a normal routine EEG recording in the awake, drowsy/sleep   state(s).  Clinical correlation is recommended.         ASSESSMENT AND PLAN:    1. " Localization-related epilepsy (HCC)    2. Obesity (BMI 30-39.9)  - Patient identified as having weight management issue.  Appropriate orders and counseling given.  - REFERRAL TO [facility] IMPROVEMENT PROGRAMS (HIP); Future  - REFERRAL TO NUTRITION SERVICES    3. Screening for depression    4. Encounter for examination for driving license    5. B12 deficiency          CLINICAL DISCUSSION:  Nature of spells unknown. Epileptic vs non epileptic spells. Started in 2018 after a series of surgical procedures complicated by pericardial effusions. She initially had 2 convulsions in one night. She was started on keppra then was weaned off in 2019 d/t side effects. She then had a breakthrough seizure in Dec 2019. She was then started on low dose lamotrigine 100mg QHS and has been spell free since. No side effects. No rash. No triggers. There was tongue biting but no incontinence. Spells were at night when she was sleeping. Routine EEG normal but aware that normal EEG does not r/o epilepsy. Her MRI brain was abnormal with  nonspecific tiny T2 hyperintense foci in the right frontal periventricular white matter. Pt had established care with Dr. Jacobs in [facility]. They will continue to monitor abnormality. As far as seizures, she has been spell free.      No family hx of epilepsy or MS. No TBI hx.      Not drinking alcohol. Not using alcohol or recreational drug use.      Mood is good. No suicidal or homicidal thoughts.       She is homosexual and not interested in having sexual intercourse with the opposite sex. Not planning on getting pregnant. She is aware of the risk of pregnancy complications while taking ASMs which include congenital defects, abnormal fetal growth, spontaneous , etc. She will let me know if she plans on having a baby someday and we can start folic acid.      Past ASM's: Keppra (suicidal thoughts, drowsiness, fatigue)     Current ASM's: Lamotrigine SR 100mg QHS        Plan:  -continue ASM     -  Discussed avoidance of spell/sz triggers: alcohol, sleep deprivation, energy drinks and stress.     - Discussed Vit D supplementation. Recommended taking 2000-5000u daily after weekly supplementation. Vit D very low (9)     - Discussed driving restrictions. Okay to drive but aware to stop driving immediately if a spell occurs and report to us. Given DMV paperwork again.      -Labs to be checked for next appointment: none    -Recommended taking daily SL B12 for deficinecy    -Discussed lifestyle modification including exercise and weight loss. Pt wanted ref to HIP and nutrition services. Ref given.         FOLLOW-UP:   Return in about 3 months (around 9/9/2021).      EDUCATION AND COUNSELING:  -Education was provided to the patient and/or family regarding diagnosis and prognosis. The chronic and unpredictable nature of the condition were discussed. There is increased risk for additional events, which may carry potential for significant injuries and death. Discussed frequent seizure triggers: sleep deprivation, medication non-compliance, use of illegal drugs/alcohol, stress, and others.   -We reviewed in detail the current antiepileptic regimen. Potential side effects of antiepileptics were discussed at length, including but no limited to: hypersensitivity reactions (rash and others, some of which can be fatal), visual field changes (some of which may be irreversible), glaucoma, diplopia, kidney stones, osteopenia/osteoporosis/bone fractures, hyperthermia/anhydrosis, hyponatremia, tremors/abnormal movements, ataxia, dizziness, fatigue, increased risk for falls, risk for cardiac arrhythmias/syncope, gastrointestinal side effects(hepatitis, pancreatitis, gastritis, ulcers), gingival hypertrophy/bleeding, drowsiness, sedation, anxiety/nervousness, increased risk for suicide, increased risk for depression, and psychosis.   -We also reviewed drug-drug interactions and their potential effect on seizure control and  medication side effects.    -We also discussed in detail potential effects of seizures, epilepsy, and medications during pregnancy, including but not limited to fetal malformations, child developmental/intellectual disability, fetal/ risk for hemorrhages, stillbirth, maternal death, premature birth, and others. The patient/family aware that pregnancy should be avoided, unless desired, in which case we recommend discussing with us at least a year prior to planned conception. To avoid undesired pregnancy while on antiepileptics, we recommend dual contraception.   -Folic acid 2 mg is recommended for all females in childbearing age (12-44 years of age).   -Recommend chronic vitamin D supplementation and regular exercise (if not contraindicated).   -Patient/family educated on risk for SUDEP (Sudden Death in Epilepsy). Counseling was provided on the importance of strict medication and follow up compliance. The patient/family understand the risks associated with non-adherence with the medical plan as outlined, including but not limited to an increased risk for breakthrough seizures, which may contribute to injuries, disability, status epilepticus, and even death.   -Counseling was also provided on potential effects of alcohol and other drugs, which may lower seizure threshold and/or affect the metabolism of antiepileptic drugs. We recommend avoidance of alcohol and illegal drugs.  -Avoid sleep deprivation.   -We extensively discussed the aspects related to safety in drivers who suffer from epilepsy. The patient is encourage to report to the Division of Motor Vehicles of any condition and/or spells related to confusion, disorientation, and/or loss of awareness and/or loss of consciousness; as these may pose a safety issue if they occur while operating a motor vehicle. The patient and/or family are ultimately responsible for exercising caution and abiding to regulations in place.   -Other seizure precautions were  discussed at length, including no diving, no skydiving, no climbing or exposure to unprotected heights, no unsupervised swimming, no Jacuzzi or bathing in bathtubs or deep bodies of water. The patient/family have been advised about risks for operating any machinery while suffering from seizures / syncope / epilepsy and/or while taking antiepileptic drugs.   -The patient understands and agrees that due to the complexity of his/her diagnosis, results of any testing and further recommendations will typically be discussed/made during a face to face encounter in my office. The patient and/or family further understands it is their responsibility to keep proper follow up.     Patient/family agree with plan, as outlined.         Deandra Gipson, MSN, APRN, FNP-C  [facility]  Office: [phone]  Fax: [phone]    BILLING DOCUMENTATION:   Total time spent including chart review before and after visit was 27min. Over 50% of the time of the visit today was spent on counseling and or coordination of care wtih the patient and/or family, with greater than 50% of the total discussing my assessment and plan as stated above.

## 2021-06-09 ENCOUNTER — OFFICE VISIT (OUTPATIENT)
Dept: NEUROLOGY | Facility: MEDICAL CENTER | Age: 26
End: 2021-06-09
Attending: NURSE PRACTITIONER
Payer: COMMERCIAL

## 2021-06-09 VITALS
SYSTOLIC BLOOD PRESSURE: 120 MMHG | BODY MASS INDEX: 32.25 KG/M2 | HEIGHT: 63 IN | WEIGHT: 182 LBS | DIASTOLIC BLOOD PRESSURE: 72 MMHG | OXYGEN SATURATION: 96 % | HEART RATE: 78 BPM | RESPIRATION RATE: 12 BRPM | TEMPERATURE: 96.5 F

## 2021-06-09 DIAGNOSIS — Z02.4 ENCOUNTER FOR EXAMINATION FOR DRIVING LICENSE: ICD-10-CM

## 2021-06-09 DIAGNOSIS — E66.9 OBESITY (BMI 30-39.9): ICD-10-CM

## 2021-06-09 DIAGNOSIS — Z13.31 SCREENING FOR DEPRESSION: ICD-10-CM

## 2021-06-09 DIAGNOSIS — G40.109 LOCALIZATION-RELATED EPILEPSY (HCC): ICD-10-CM

## 2021-06-09 DIAGNOSIS — E53.8 B12 DEFICIENCY: ICD-10-CM

## 2021-06-09 PROCEDURE — 99213 OFFICE O/P EST LOW 20 MIN: CPT | Performed by: NURSE PRACTITIONER

## 2021-06-09 PROCEDURE — 99212 OFFICE O/P EST SF 10 MIN: CPT | Performed by: NURSE PRACTITIONER

## 2021-06-09 ASSESSMENT — FIBROSIS 4 INDEX: FIB4 SCORE: 0.31

## 2021-07-01 ENCOUNTER — OFFICE VISIT (OUTPATIENT)
Dept: HEALTH INFORMATION MANAGEMENT | Facility: MEDICAL CENTER | Age: 26
End: 2021-07-01
Payer: COMMERCIAL

## 2021-07-01 VITALS — WEIGHT: 182.7 LBS | BODY MASS INDEX: 32.37 KG/M2 | HEIGHT: 63 IN

## 2021-07-01 DIAGNOSIS — E66.9 OBESITY (BMI 30-39.9): ICD-10-CM

## 2021-07-01 PROCEDURE — 97802 MEDICAL NUTRITION INDIV IN: CPT | Performed by: DIETITIAN, REGISTERED

## 2021-07-01 ASSESSMENT — FIBROSIS 4 INDEX: FIB4 SCORE: 0.31

## 2021-07-01 NOTE — PROGRESS NOTES
"7/1/2021    TOMMY Draper  25 y.o.   Time in/out: 2:03-2:55    Anthropometrics/Objective  Vitals:    07/01/21 1507   Weight: 82.9 kg (182 lb 11.2 oz)   Height: 1.6 m (5' 3\")       Body mass index is 32.36 kg/m².    Stated Goal Weight: 150 lb  Estimated Caloric needs 1538 Kcal ([place])  See comprehensive patient history form for further information     Subjective:  - pt here today to learn more about a healthy diet and lifestyle, has a goal of weight loss  - pt reports she gained when when taking prednisone in 2018   - no previous nutrition education  - likes some vegetables- pt reports she is a picky eater  - walks to work daily - 15 minutes each way  - rarely eats breakfast- found making smoothies in the past helpful to have breakfast- smoothie includes: protein powder, juice, berries    Nutrition Diagnosis (PES Statement)  · Obesity related to excessive energy intake and inadequate energy energy expenditure as evidenced by BMI of 32.36.    Client history:  Condition(s) associated with a diagnosis or treatment (specify) obesity    Biochemical data, medical test and procedures  Lab Results   Component Value Date/Time    HBA1C 5.2 07/22/2018 12:09 AM   @  No results found for: POCGLUCOSE  Lab Results   Component Value Date/Time    CHOLSTRLTOT 102 07/22/2018 03:55 AM    LDL 59 07/22/2018 03:55 AM    HDL 28 (A) 07/22/2018 03:55 AM    TRIGLYCERIDE 76 07/22/2018 03:55 AM         Nutrition Intervention  Nutrition Prescription  Recommended Daily Kcals 1500 kcal/d    Meal and Snack  Recommend a general/healthful diet    Comprehensive Nutrition education Instruction or training leading to in-depth nutrition related knowledge about:  Combine carb, protein and fat at each meal, Meal timing and spacing, Physical activity/exercise Handouts provided regarding topics discussed   - My Plate   - Snack List with Fruit   - Protein MR shakes,bars, snack bars   - MFP How-to Guide   - LCD Meal Idea    Monitoring & Evaluation " Plan    Behavioral-Environmental:  Behavior: keep a food journal on My Fitness Pal  Physical activity: increase as tolerated    Food / Nutrient Intake:  Fluid/Beverage intake: drink at least 64 oz of water daily, choose unsweetened beverages  Food intake: follow the plate method for meal balance and portion control    Physical Signs / Symptoms:  Weight change -1-2 lbs per week    Assessment Notes: Today we reviewed the plate method for meal balance and portion control. We dicussed including a 'palm size' of protein, 1/2 plate NS veggies and 1/4 plate complex carbs with meals. Reviewed the snack list with fruit and circled some of the NS veggies that Theodora does like. We reviewed meal timing together. Recomended for pt to include 3 meals with 1-2 snacks daily. Suggested she could try the MR shake/bar options for breakfast or make a smoothie that includes veggies. We discussed making a smoothie that includes ~1 cup berries, greens, unsweetened almond milk or water and a protein powder with 20g protein. Suggested for pt to increase activity as tolerated and reviewed the benefits of resistance exercise for weight loss. Provided her with the PA recommendations for adults of 150 mins/ week with resistance exercise 2x/ week. Dicussed the benefits of tracking on My Fitness Pal and provided her with a 1500 kcal/d goal. Reviewed some unsweetened beverage choices to replace soda.     Goals:  1. Meal plan  2. Increase activity, add resistance exercise     F/U: 1 month

## 2021-07-23 ENCOUNTER — OFFICE VISIT (OUTPATIENT)
Dept: NEUROLOGY | Facility: MEDICAL CENTER | Age: 26
End: 2021-07-23
Attending: PSYCHIATRY & NEUROLOGY
Payer: COMMERCIAL

## 2021-07-23 VITALS
DIASTOLIC BLOOD PRESSURE: 72 MMHG | HEART RATE: 85 BPM | SYSTOLIC BLOOD PRESSURE: 118 MMHG | BODY MASS INDEX: 32.03 KG/M2 | OXYGEN SATURATION: 97 % | WEIGHT: 180.78 LBS | TEMPERATURE: 98.2 F | HEIGHT: 63 IN

## 2021-07-23 DIAGNOSIS — R90.89 ABNORMAL FINDING ON MRI OF BRAIN: Primary | ICD-10-CM

## 2021-07-23 PROBLEM — M70.62 TROCHANTERIC BURSITIS OF BOTH HIPS: Status: ACTIVE | Noted: 2020-09-10

## 2021-07-23 PROBLEM — R07.89 NON-CARDIAC CHEST PAIN: Status: ACTIVE | Noted: 2018-07-21

## 2021-07-23 PROBLEM — M70.61 TROCHANTERIC BURSITIS OF BOTH HIPS: Status: ACTIVE | Noted: 2020-09-10

## 2021-07-23 PROCEDURE — 99214 OFFICE O/P EST MOD 30 MIN: CPT | Performed by: PSYCHIATRY & NEUROLOGY

## 2021-07-23 PROCEDURE — 99211 OFF/OP EST MAY X REQ PHY/QHP: CPT | Performed by: PSYCHIATRY & NEUROLOGY

## 2021-07-23 RX ORDER — DICLOFENAC SODIUM 75 MG/1
TABLET, DELAYED RELEASE ORAL
COMMUNITY
Start: 2021-07-12 | End: 2021-11-09

## 2021-07-23 ASSESSMENT — FIBROSIS 4 INDEX: FIB4 SCORE: 0.31

## 2021-07-23 NOTE — PROGRESS NOTES
[facility]  MULTIPLE SCLEROSIS & NEUROIMMUNOLOGY  FOLLOW-UP VISIT    DISEASE SUMMARY:  Principal neurologic diagnosis: RIS  Diagnosis of MS: n/a  Disease History:  - no clinical events convincing for relapse  Disease course at onset: RIS  Current disease course: RIS  Previous disease therapies:  - none  Current disease therapies:  - none  Symptomatic therapies:  - none  CSF:  - not sampled  Other Testing:  - none  MRI head:  - 2/17/2021: two bud-ventricular lesions without enhancement  MRI cervical spine:  - 4/6/2021: no visible lesions  MRI thoracic spine:  - 4/6/2021: no visible lesions    CC: RIS    INTERVAL HISTORY:  Theodora Ponce is a 25 y.o. 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  I last saw her in the [facility] on 3/17/2021.  At that time I recommended blood work to screen for MS mimics as well as MRI of the cervical and thoracic spine.  Today, she was accompanied by her wife, and she provided the following interval history:    Theodora has not experienced any new or worsened neurologic symptoms since the last visit.  Occasionally (when she is overheated) she will experience whole body (including the face) tingling.  This lasts for minutes at a time and then resolves completely.    MEDICATIONS:  Current Outpatient Medications   Medication Sig   • diclofenac DR (VOLTAREN) 75 MG Tablet Delayed Response    • LamoTRIgine 100 MG TABLET SR 24 HR Take 100 mg by mouth every bedtime.   • ergocalciferol (DRISDOL) 01894 UNIT capsule Take 1 capsule by mouth every 7 days.   • methocarbamol (ROBAXIN) 500 MG Tab Take  by mouth 4 times a day.   • TYLENOL 8 HOUR PO Take  by mouth.   • Diclofenac Sodium (VOLTAREN) 1 % Gel Apply 4 g to skin as directed 3 times a day as needed. (Patient not taking: Reported on 7/23/2021)     MEDICAL, SOCIAL, AND FAMILY HISTORY:  There is no change in the patient's ROS or medical, social, or family histories since the previous visit on 3/17/2021.    REVIEW OF  SYSTEMS:  A ROS was completed.  Pertinent positives and negatives were included in the HPI, above.  All other systems were reviewed and are negative.    PHYSICAL EXAM:  General/Medical:  - NAD  - hair, skin, nails, and joints were normal     Neuro:  MENTAL STATUS: awake and alert; no deficits of speech or language; oriented to person, place, and time; affect was appropriate to situation; pleasant, cooperative     CRANIAL NERVES:    II: acuity was: J1+/J1+; fields intact to confrontation; pupils 3/3 to 2/2 without a relative afferent pupillary defect; discs sharp    III/IV/VI: versions intact without nystagmus    V: facial sensation symmetric to light touch    VII: facial expression symmetric    VIII: hearing intact to finger rub    IX/X: palate elevates symmetrically    XI: shoulder shrug symmetric    XII: tongue midline     MOTOR:  - bulk and tone were normal throughout  Upper Extremity Strength  (R/L)    5/5   Elbow flexion 5/5   Elbow extension 5/5   Shoulder abduction 5/5      Lower Extremity Strength  (R/L)   Hip flexion 5/5   Knee extension 5/5   Knee flexion 5/5   Ankle plantarflexion 5/5   Ankle dorsiflexion 5/5      - can walk on toes and heels  - no pronator drift; no abnormal movements     SENSATION:  - light touch: grossly intact over the upper and lower extremities  - vibration (R/L, seconds): NT at the great toes  - pinprick: NT  - proprioception: NT  - Romberg: absent     COORDINATION:  - finger to nose was normal, no ataxia on exam  - finger tapping was rapid and accurate, bilaterally     REFLEXES:  Reflex Right Left   BR 2+ 2+   Biceps 2+ 2+   Triceps 2+ 2+   Patellae 2+ 2+   Achilles 2+ 2+   Toes NT NT      GAIT:  - narrow base and normal  - heel-raised and toe-raised gait: intact  - tandem gait: intact     QUANTITATIVE SCORES:  Timed 25-foot walk (sec): NT (4.2 on 3/17/2021).  Assistive device: none    REVIEW OF IMAGING STUDIES: I reviewed the following studies:  MRI Cervical Spine:  Date:  "4/6/2021  W/o and w/ contrast?: yes  Indication: \"workup for possible MS\"  Comparison: none  Impression:  \"1) No abnormal cervical cord signal or enhancement.  2) Minimal spondylosis. No spinal or foraminal stenosis.\"    MRI Thoracic Spine:  Date: 4/6/2021  W/o and w/ contrast?: yes  Indication: \"workup for possible MS\"  Comparison: none  Impression:  \"1) No abnormal thoracic cord signal or enhancement.  2) T6-T7 small posterior disc extrusion.  3) No significant thoracic spinal or foraminal stenosis.\"    REVIEW OF LABORATORY STUDIES:  3/17/2021:  - SSA: 0  - SSB: 0  - TSH: 1.400  - vitamin B12: 340  - KIYA profile: none detected   - anti-dsDNA: none detected    ASSESSMENT:  Theodora Ponce is a 25 y.o. woman with RIS and localization-related epilepsy.  Theodora does not meet criteria for a diagnosis of MS.  MRI of the cervical and thoracic spine revealed no lesions in these areas.  A screen for MS mimics was negative.  Theodora has not experienced any neurological symptoms which follow a typical time course.  We discussed the option of lumbar puncture for testing of oligoclonal bands.  Theodora declined for now.  We will repeat MRI brain next February in order to assess for the emergence of new lesions.  In the meantime, she will contact the clinic if she develops any new/worsened neurologic symptoms.    PLAN:  Radiologically Isolated Syndrome:  - defer LP for now  - repeat MRI brain in 2/2022    Follow-Up:  - Return in about 7 months (around 3/5/2022).    Signed: Toni Jacobs M.D.    BILLING DOCUMENTATION:   I spent 36 minutes reviewing the medical record, interviewing and examining the patient, discussing my impression (see \"assessment\" above), and coordinating care.  "

## 2021-08-16 ENCOUNTER — HOSPITAL ENCOUNTER (OUTPATIENT)
Dept: RADIOLOGY | Facility: MEDICAL CENTER | Age: 26
End: 2021-08-16
Attending: PHYSICIAN ASSISTANT
Payer: COMMERCIAL

## 2021-08-16 DIAGNOSIS — M65.842 OTHER SYNOVITIS AND TENOSYNOVITIS, LEFT HAND: ICD-10-CM

## 2021-08-16 DIAGNOSIS — E55.9 VITAMIN D DEFICIENCY: ICD-10-CM

## 2021-08-16 PROCEDURE — 73218 MRI UPPER EXTREMITY W/O DYE: CPT | Mod: LT

## 2021-08-16 RX ORDER — ERGOCALCIFEROL 1.25 MG/1
50000 CAPSULE ORAL
Qty: 4 CAPSULE | Refills: 5 | OUTPATIENT
Start: 2021-08-16

## 2021-10-05 ENCOUNTER — TELEPHONE (OUTPATIENT)
Dept: NEUROLOGY | Facility: MEDICAL CENTER | Age: 26
End: 2021-10-05

## 2021-10-05 DIAGNOSIS — G40.109 LOCALIZATION-RELATED EPILEPSY (HCC): ICD-10-CM

## 2021-10-05 DIAGNOSIS — E55.9 VITAMIN D DEFICIENCY: ICD-10-CM

## 2021-10-05 RX ORDER — ERGOCALCIFEROL 1.25 MG/1
50000 CAPSULE ORAL
Qty: 4 CAPSULE | Refills: 5 | OUTPATIENT
Start: 2021-10-05

## 2021-10-05 RX ORDER — LAMOTRIGINE 100 MG/1
100 TABLET, EXTENDED RELEASE ORAL
Qty: 30 TABLET | Refills: 5 | Status: SHIPPED | OUTPATIENT
Start: 2021-10-05 | End: 2021-11-09 | Stop reason: SDUPTHER

## 2021-10-05 NOTE — TELEPHONE ENCOUNTER
Called pt to schedule [facility] for med refills. Pt agreed to appt on 11/9/21 at 9:20am. Refill requests sent to [facility]

## 2021-10-05 NOTE — TELEPHONE ENCOUNTER
Received request via: Patient    Was the patient seen in the last year in this department? Yes    Does the patient have an active prescription (recently filled or refills available) for medication(s) requested? Yes   Pt sent [facility] message saying that they are running out of lamotrigine and vitamin D.

## 2021-10-25 NOTE — PROGRESS NOTES
Chief Complaint   Patient presents with   • Follow-Up     Seizures       Problem List Items Addressed This Visit     Abnormal finding on MRI of brain      Other Visit Diagnoses     Witnessed seizure-like activity (HCC)        Relevant Medications    LamoTRIgine 100 MG TABLET SR 24 HR    Screening for depression        B12 deficiency        Relevant Orders    VITAMIN B12    Vitamin D deficiency        Relevant Orders    VITAMIN D,25 HYDROXY          Interim History:  Theodora Ponce 26 y.o. female presents today with partner for seizure f/u.     She continues to have no seizures on lamotrigine ER 100mg QHS. She is tolerating this better. No rash. Mood is good. No SI or HI. She is not taking vit D. She is f/u with Dr. Jacobs for the abnormal MRI brain. She is not driving yet as she has not been to the [facility]. She is not taking B12. No other issues.     Past medical history:   Past Medical History:   Diagnosis Date   • Pericarditis 05/2018   • Seizure disorder (HCC)    • Tonsillitis        Past surgical history:   Past Surgical History:   Procedure Laterality Date   • CHOLECYSTECTOMY  05/2018   • APPENDECTOMY  04/2018   • TONSILLECTOMY  2/24/2010    Performed by YULIYA PEDERSEN at SURGERY SAME DAY [facility] ORS       Family history:   No family history on file.    Social history:   Social History     Socioeconomic History   • Marital status: Single     Spouse name: Not on file   • Number of children: Not on file   • Years of education: Not on file   • Highest education level: Not on file   Occupational History   • Not on file   Tobacco Use   • Smoking status: Never Smoker   • Smokeless tobacco: Never Used   Vaping Use   • Vaping Use: Never used   Substance and Sexual Activity   • Alcohol use: No     Comment: weekends   • Drug use: No   • Sexual activity: Not on file   Other Topics Concern   • Not on file   Social History Narrative    ** Merged History Encounter **          Social Determinants of Health     Financial  Resource Strain:    • Difficulty of Paying Living Expenses:    Food Insecurity:    • Worried About Running Out of Food in the Last Year:    • Ran Out of Food in the Last Year:    Transportation Needs:    • Lack of Transportation (Medical):    • Lack of Transportation (Non-Medical):    Physical Activity:    • Days of Exercise per Week:    • Minutes of Exercise per Session:    Stress:    • Feeling of Stress :    Social Connections:    • Frequency of Communication with Friends and Family:    • Frequency of Social Gatherings with Friends and Family:    • Attends Buddhism Services:    • Active Member of Clubs or Organizations:    • Attends Club or Organization Meetings:    • Marital Status:    Intimate Partner Violence:    • Fear of Current or Ex-Partner:    • Emotionally Abused:    • Physically Abused:    • Sexually Abused:        Current medications:   Current Outpatient Medications   Medication   • LamoTRIgine 100 MG TABLET SR 24 HR   • diclofenac DR (VOLTAREN) 75 MG Tablet Delayed Response   • ergocalciferol (DRISDOL) 01331 UNIT capsule   • methocarbamol (ROBAXIN) 500 MG Tab   • Diclofenac Sodium (VOLTAREN) 1 % Gel   • TYLENOL 8 HOUR PO     No current facility-administered medications for this visit.       Medication Allergy:  Allergies   Allergen Reactions   • Vicodin [Hydrocodone-Acetaminophen] Hives         Review of systems:     General: Denies fevers or chills, or nightsweats, or generalized fatigue.    Head: Denies headaches or dizziness or lightheadedness  Dermatologic:  Denies new rash  Musculoskeletal: Denies muscle pain or swelling, no atrophy, no neck and back pain or stiffness.   Neurologic: Denies facial droopiness, muscle weakness (focal or generalized), paresthesias, ataxia, change in speech or language, memory loss, abnormal movements, seizures, loss of consciousness, or episodes of confusion.   Psychiatric: Denies anxiety, depression, mood swings, suicidal or homicidal thoughts       Physical  "examination:   Vitals:    11/09/21 0933   BP: (!) 92/68   BP Location: Right arm   Patient Position: Sitting   BP Cuff Size: Adult   Pulse: 85   Temp: 36.9 °C (98.4 °F)   TempSrc: Temporal   SpO2: 95%   Weight: 78.9 kg (173 lb 15.1 oz)   Height: 1.6 m (5' 3\")     General: Patient in no acute distress, pleasant and cooperative.  HEENT: Normocephalic, no signs of acute trauma.   Neck: Supple. There is normal range of motion.   Musculoskeletal: joints exhibit full range of motion  Psychiatric: No hallucinatory behavior. No symptoms of depression or suicidal ideation. Mood and affect appear normal on exam.     NEUROLOGICAL EXAM:   Mental status, orientation: Awake, alert and fully oriented.   Speech and language: speech is clear and fluent. The patient is able to name, repeat and comprehend.   Memory: There is intact recollection of recent and remote events.   Motor exam: Strength is 5/5 in all extremities. Tone is normal. No abnormal movements were seen on exam.   Sensory exam reveals normal sense of light touch in all extremities.   Gait: The patient was able to get up from seated position on first attempt without requiring assistance. Found to be steady when walking.       ANCILLARY DATA REVIEWED:   Lab Data Review:  Reviewed in chart.     Records reviewed:   Reviewed in chart.    Imaging:   MRI brain w & w/o 2/17/21  1. A couple nonspecific tiny T2 hyperintense foci in the right frontal periventricular white matter which are indeterminate and could represent nonspecific gliosis although cannot exclude tiny chronic demyelinating lesions. No abnormal enhancement.  2. No evidence of mass lesion, heterotopic gray matter, gross cortical dysplasia or mesial temporal sclerosis.     EEG:  Routine EEG 2/19/21  This is a normal routine EEG recording in the awake, drowsy/sleep   state(s).  Clinical correlation is recommended.       ASSESSMENT AND PLAN:    1. Witnessed seizure-like activity (HCC)  - LamoTRIgine 100 MG TABLET SR " 24 HR; Take 100 mg by mouth at bedtime.  Dispense: 90 Tablet; Refill: 3    2. Abnormal finding on MRI of brain    3. Screening for depression    4. B12 deficiency  - VITAMIN B12; Future    5. Vitamin D deficiency  - VITAMIN D,25 HYDROXY; Future          CLINICAL DISCUSSION:  Nature of spells unknown. Epileptic vs non epileptic spells. Started in  after a series of surgical procedures complicated by pericardial effusions. She initially had 2 convulsions in one night. She was started on keppra then was weaned off in  d/t side effects. She then had a breakthrough seizure in Dec 2019. She was then started on low dose lamotrigine 100mg QHS and has been spell free since. No side effects. No rash. No triggers. There was tongue biting but no incontinence. Spells were at night when she was sleeping. Routine EEG normal but aware that normal EEG does not r/o epilepsy. Her MRI brain was abnormal with  nonspecific tiny T2 hyperintense foci in the right frontal periventricular white matter. Pt had established care with Dr. Jacobs in MS clinic. They will continue to monitor abnormality.     She remains spell free on current regimen.      No family hx of epilepsy or MS. No TBI hx.      Occasionally drinks alcohol. No recreational drug use.      Mood is good. No suicidal or homicidal thoughts.       She is homosexual and not interested in having sexual intercourse with the opposite sex. Not planning on getting pregnant. She is aware of the risk of pregnancy complications while taking ASMs which include congenital defects, abnormal fetal growth, spontaneous , etc. She will let me know if she plans on having a baby someday and we can start folic acid.      Past ASM's: Keppra (suicidal thoughts, drowsiness, fatigue)     Current ASM's: Lamotrigine SR 100mg QHS        Plan:  -continue ASM     - Discussed avoidance of spell/sz triggers: alcohol, sleep deprivation, energy drinks and stress.     - Discussed Vit D  supplementation. Recommended taking 2000-5000u daily. Did not tolerated Rx dose.      - Discussed driving restrictions. Okay to drive but aware to stop driving immediately if a spell occurs and report to us. She will f/u with [employer] and will let me know if she needs another clearance.     -Labs to be checked for next appointment: B12 and Vit D- early next year.      -Recommended taking B12 for deficiency.         FOLLOW-UP:   Return in about 1 year (around 11/9/2022), or if symptoms worsen or fail to improve.      EDUCATION AND COUNSELING:  -Education was provided to the patient and/or family regarding diagnosis and prognosis. The chronic and unpredictable nature of the condition were discussed. There is increased risk for additional events, which may carry potential for significant injuries and death. Discussed frequent seizure triggers: sleep deprivation, medication non-compliance, use of illegal drugs/alcohol, stress, and others.   -We reviewed in detail the current antiepileptic regimen. Potential side effects of antiepileptics were discussed at length, including but no limited to: hypersensitivity reactions (rash and others, some of which can be fatal), visual field changes (some of which may be irreversible), glaucoma, diplopia, kidney stones, osteopenia/osteoporosis/bone fractures, hyperthermia/anhydrosis, hyponatremia, tremors/abnormal movements, ataxia, dizziness, fatigue, increased risk for falls, risk for cardiac arrhythmias/syncope, gastrointestinal side effects(hepatitis, pancreatitis, gastritis, ulcers), gingival hypertrophy/bleeding, drowsiness, sedation, anxiety/nervousness, increased risk for suicide, increased risk for depression, and psychosis.   -We also reviewed drug-drug interactions and their potential effect on seizure control and medication side effects.    -We also discussed in detail potential effects of seizures, epilepsy, and medications during pregnancy, including but not limited to fetal  malformations, child developmental/intellectual disability, fetal/ risk for hemorrhages, stillbirth, maternal death, premature birth, and others. The patient/family aware that pregnancy should be avoided, unless desired, in which case we recommend discussing with us at least a year prior to planned conception. To avoid undesired pregnancy while on antiepileptics, we recommend dual contraception.   -Folic acid 2 mg is recommended for all females in childbearing age (12-44 years of age).   -Recommend chronic vitamin D supplementation and regular exercise (if not contraindicated).   -Patient/family educated on risk for SUDEP (Sudden Death in Epilepsy). Counseling was provided on the importance of strict medication and follow up compliance. The patient/family understand the risks associated with non-adherence with the medical plan as outlined, including but not limited to an increased risk for breakthrough seizures, which may contribute to injuries, disability, status epilepticus, and even death.   -Counseling was also provided on potential effects of alcohol and other drugs, which may lower seizure threshold and/or affect the metabolism of antiepileptic drugs. We recommend avoidance of alcohol and illegal drugs.  -Avoid sleep deprivation.   -We extensively discussed the aspects related to safety in drivers who suffer from epilepsy. The patient is encourage to report to the Division of Motor Vehicles of any condition and/or spells related to confusion, disorientation, and/or loss of awareness and/or loss of consciousness; as these may pose a safety issue if they occur while operating a motor vehicle. The patient and/or family are ultimately responsible for exercising caution and abiding to regulations in place.   -Other seizure precautions were discussed at length, including no diving, no skydiving, no climbing or exposure to unprotected heights, no unsupervised swimming, no Jacuzzi or bathing in bathtubs or deep  bodies of water. The patient/family have been advised about risks for operating any machinery while suffering from seizures / syncope / epilepsy and/or while taking antiepileptic drugs.   -The patient understands and agrees that due to the complexity of his/her diagnosis, results of any testing and further recommendations will typically be discussed/made during a face to face encounter in my office. The patient and/or family further understands it is their responsibility to keep proper follow up.     Patient/family agree with plan, as outlined.         Deandra Gipson, MSN, APRN, FNP-C  [facility]  Office: [phone]  Fax: [phone]    BILLING DOCUMENTATION:   Total time spent including chart review before and after visit was 32min. Over 50% of the time of the visit today was spent on counseling and or coordination of care wtih the patient and/or family, with greater than 50% of the total discussing my assessment and plan as stated above. This time did not include VNS programming, which was in addition to the total visit time.

## 2021-11-08 ENCOUNTER — HOSPITAL ENCOUNTER (EMERGENCY)
Facility: MEDICAL CENTER | Age: 26
End: 2021-11-08
Attending: EMERGENCY MEDICINE
Payer: COMMERCIAL

## 2021-11-08 ENCOUNTER — APPOINTMENT (OUTPATIENT)
Dept: RADIOLOGY | Facility: MEDICAL CENTER | Age: 26
End: 2021-11-08
Attending: EMERGENCY MEDICINE
Payer: COMMERCIAL

## 2021-11-08 VITALS
RESPIRATION RATE: 16 BRPM | BODY MASS INDEX: 30.43 KG/M2 | TEMPERATURE: 97 F | DIASTOLIC BLOOD PRESSURE: 57 MMHG | WEIGHT: 171.74 LBS | OXYGEN SATURATION: 96 % | HEIGHT: 63 IN | HEART RATE: 80 BPM | SYSTOLIC BLOOD PRESSURE: 119 MMHG

## 2021-11-08 DIAGNOSIS — R07.9 CHEST PAIN, UNSPECIFIED TYPE: ICD-10-CM

## 2021-11-08 DIAGNOSIS — M94.0 COSTOCHONDRITIS: ICD-10-CM

## 2021-11-08 LAB
ALBUMIN SERPL BCP-MCNC: 4.6 G/DL (ref 3.2–4.9)
ALBUMIN/GLOB SERPL: 1.5 G/DL
ALP SERPL-CCNC: 75 U/L (ref 30–99)
ALT SERPL-CCNC: 10 U/L (ref 2–50)
ANION GAP SERPL CALC-SCNC: 11 MMOL/L (ref 7–16)
AST SERPL-CCNC: 13 U/L (ref 12–45)
BASOPHILS # BLD AUTO: 0.4 % (ref 0–1.8)
BASOPHILS # BLD: 0.03 K/UL (ref 0–0.12)
BILIRUB SERPL-MCNC: 0.5 MG/DL (ref 0.1–1.5)
BUN SERPL-MCNC: 9 MG/DL (ref 8–22)
CALCIUM SERPL-MCNC: 9.4 MG/DL (ref 8.5–10.5)
CHLORIDE SERPL-SCNC: 105 MMOL/L (ref 96–112)
CO2 SERPL-SCNC: 23 MMOL/L (ref 20–33)
CREAT SERPL-MCNC: 0.62 MG/DL (ref 0.5–1.4)
EKG IMPRESSION: NORMAL
EOSINOPHIL # BLD AUTO: 0.05 K/UL (ref 0–0.51)
EOSINOPHIL NFR BLD: 0.7 % (ref 0–6.9)
ERYTHROCYTE [DISTWIDTH] IN BLOOD BY AUTOMATED COUNT: 43.4 FL (ref 35.9–50)
GLOBULIN SER CALC-MCNC: 3 G/DL (ref 1.9–3.5)
GLUCOSE SERPL-MCNC: 90 MG/DL (ref 65–99)
HCT VFR BLD AUTO: 44.3 % (ref 37–47)
HGB BLD-MCNC: 14.9 G/DL (ref 12–16)
IMM GRANULOCYTES # BLD AUTO: 0.02 K/UL (ref 0–0.11)
IMM GRANULOCYTES NFR BLD AUTO: 0.3 % (ref 0–0.9)
LYMPHOCYTES # BLD AUTO: 2.42 K/UL (ref 1–4.8)
LYMPHOCYTES NFR BLD: 33.4 % (ref 22–41)
MCH RBC QN AUTO: 29.8 PG (ref 27–33)
MCHC RBC AUTO-ENTMCNC: 33.6 G/DL (ref 33.6–35)
MCV RBC AUTO: 88.6 FL (ref 81.4–97.8)
MONOCYTES # BLD AUTO: 0.51 K/UL (ref 0–0.85)
MONOCYTES NFR BLD AUTO: 7 % (ref 0–13.4)
NEUTROPHILS # BLD AUTO: 4.21 K/UL (ref 2–7.15)
NEUTROPHILS NFR BLD: 58.2 % (ref 44–72)
NRBC # BLD AUTO: 0 K/UL
NRBC BLD-RTO: 0 /100 WBC
PLATELET # BLD AUTO: 329 K/UL (ref 164–446)
PMV BLD AUTO: 10.9 FL (ref 9–12.9)
POTASSIUM SERPL-SCNC: 3.9 MMOL/L (ref 3.6–5.5)
PROT SERPL-MCNC: 7.6 G/DL (ref 6–8.2)
RBC # BLD AUTO: 5 M/UL (ref 4.2–5.4)
SODIUM SERPL-SCNC: 139 MMOL/L (ref 135–145)
TROPONIN T SERPL-MCNC: <6 NG/L (ref 6–19)
WBC # BLD AUTO: 7.2 K/UL (ref 4.8–10.8)

## 2021-11-08 PROCEDURE — 700117 HCHG RX CONTRAST REV CODE 255: Performed by: EMERGENCY MEDICINE

## 2021-11-08 PROCEDURE — 99284 EMERGENCY DEPT VISIT MOD MDM: CPT

## 2021-11-08 PROCEDURE — 71275 CT ANGIOGRAPHY CHEST: CPT

## 2021-11-08 PROCEDURE — 85025 COMPLETE CBC W/AUTO DIFF WBC: CPT

## 2021-11-08 PROCEDURE — 84484 ASSAY OF TROPONIN QUANT: CPT

## 2021-11-08 PROCEDURE — 80053 COMPREHEN METABOLIC PANEL: CPT

## 2021-11-08 PROCEDURE — 71045 X-RAY EXAM CHEST 1 VIEW: CPT

## 2021-11-08 PROCEDURE — 93005 ELECTROCARDIOGRAM TRACING: CPT | Performed by: EMERGENCY MEDICINE

## 2021-11-08 PROCEDURE — 93005 ELECTROCARDIOGRAM TRACING: CPT

## 2021-11-08 RX ORDER — IBUPROFEN 800 MG/1
800 TABLET ORAL EVERY 8 HOURS PRN
Qty: 30 TABLET | Refills: 0 | Status: SHIPPED | OUTPATIENT
Start: 2021-11-08 | End: 2023-01-02

## 2021-11-08 RX ADMIN — IOHEXOL 80 ML: 350 INJECTION, SOLUTION INTRAVENOUS at 18:00

## 2021-11-08 ASSESSMENT — FIBROSIS 4 INDEX: FIB4 SCORE: 0.32

## 2021-11-08 NOTE — ED TRIAGE NOTES
"Chief Complaint   Patient presents with   • Chest Pain     x 4 days, right sided and radiates to substernal area has since moved to left side. has since developed increased SOB.  has been taking zithro for brohchitis     /87   Pulse (!) 104   Temp 36 °C (96.8 °F) (Temporal)   Resp 18   Ht 1.6 m (5' 3\")   Wt 77.9 kg (171 lb 11.8 oz)   SpO2 97%   Pt informed of wait times. Educated on triage process.  Asked to return to triage RN for any new or worsening of symptoms. Thanked for patience.        "

## 2021-11-08 NOTE — Clinical Note
Theodora Ponce was seen and treated in our emergency department on 11/8/2021.  She may return to work on 11/11/2021.       If you have any questions or concerns, please don't hesitate to call.      Quincy Brasher D.O.

## 2021-11-09 ENCOUNTER — OFFICE VISIT (OUTPATIENT)
Dept: NEUROLOGY | Facility: MEDICAL CENTER | Age: 26
End: 2021-11-09
Attending: NURSE PRACTITIONER
Payer: COMMERCIAL

## 2021-11-09 VITALS
BODY MASS INDEX: 30.82 KG/M2 | DIASTOLIC BLOOD PRESSURE: 68 MMHG | TEMPERATURE: 98.4 F | HEIGHT: 63 IN | HEART RATE: 85 BPM | SYSTOLIC BLOOD PRESSURE: 92 MMHG | OXYGEN SATURATION: 95 % | WEIGHT: 173.94 LBS

## 2021-11-09 DIAGNOSIS — Z13.31 SCREENING FOR DEPRESSION: ICD-10-CM

## 2021-11-09 DIAGNOSIS — R90.89 ABNORMAL FINDING ON MRI OF BRAIN: ICD-10-CM

## 2021-11-09 DIAGNOSIS — E55.9 VITAMIN D DEFICIENCY: ICD-10-CM

## 2021-11-09 DIAGNOSIS — E53.8 B12 DEFICIENCY: ICD-10-CM

## 2021-11-09 DIAGNOSIS — R56.9 WITNESSED SEIZURE-LIKE ACTIVITY (HCC): ICD-10-CM

## 2021-11-09 PROCEDURE — 99214 OFFICE O/P EST MOD 30 MIN: CPT | Performed by: NURSE PRACTITIONER

## 2021-11-09 PROCEDURE — 99211 OFF/OP EST MAY X REQ PHY/QHP: CPT | Performed by: NURSE PRACTITIONER

## 2021-11-09 RX ORDER — LAMOTRIGINE 100 MG/1
100 TABLET, EXTENDED RELEASE ORAL
Qty: 90 TABLET | Refills: 3 | Status: SHIPPED | OUTPATIENT
Start: 2021-11-09 | End: 2022-11-09

## 2021-11-09 ASSESSMENT — FIBROSIS 4 INDEX: FIB4 SCORE: 0.34

## 2021-11-09 NOTE — ED PROVIDER NOTES
ED Provider Note    CHIEF COMPLAINT  Chief Complaint   Patient presents with   • Chest Pain     x 4 days, right sided and radiates to substernal area has since moved to left side. has since developed increased SOB.  has been taking zithro for brohchitis       HPI  Theodora Ponce is a 26 y.o. female here for evaluation of chest pain.  Patient states she has intermittent chest pain over the last 4 days, and states that she has increasing shortness of breath with exertion.  She has had trouble because she has been being so short of breath when she walks around, that she has to stop.  She has no fever chills or vomiting, she has substernal chest pain that radiates around to the right side.  She has no history of the same regarding blood clots.  However she does have history of pericarditis.  Patient has no leg pain.  No other medical concerns.    ROS;  Please see HPI  O/W negative     PAST MEDICAL HISTORY   has a past medical history of Pericarditis (05/2018), Seizure disorder (HCC), and Tonsillitis.    SOCIAL HISTORY  Social History     Tobacco Use   • Smoking status: Never Smoker   • Smokeless tobacco: Never Used   Vaping Use   • Vaping Use: Never used   Substance and Sexual Activity   • Alcohol use: No     Comment: weekends   • Drug use: No   • Sexual activity: Not on file       SURGICAL HISTORY   has a past surgical history that includes tonsillectomy (2/24/2010); appendectomy (04/2018); and cholecystectomy (05/2018).    CURRENT MEDICATIONS  Home Medications     Reviewed by Dorota Wheeler R.N. (Registered Nurse) on 11/08/21 at 0917  Med List Status: Partial   Medication Last Dose Status   diclofenac DR (VOLTAREN) 75 MG Tablet Delayed Response  Active   Diclofenac Sodium (VOLTAREN) 1 % Gel  Active   ergocalciferol (DRISDOL) 61547 UNIT capsule  Active   LamoTRIgine 100 MG TABLET SR 24 HR  Active   methocarbamol (ROBAXIN) 500 MG Tab  Active   TYLENOL 8 HOUR PO  Active                ALLERGIES  Allergies  "  Allergen Reactions   • Vicodin [Hydrocodone-Acetaminophen] Hives       REVIEW OF SYSTEMS  See HPI for further details. Review of systems as above, otherwise all other systems are negative.     PHYSICAL EXAM  VITAL SIGNS: /67   Pulse 83   Temp 36 °C (96.8 °F) (Temporal)   Resp 18   Ht 1.6 m (5' 3\")   Wt 77.9 kg (171 lb 11.8 oz)   SpO2 96%   BMI 30.42 kg/m²     Constitutional: Well developed, well nourished. No acute distress.  HEENT: Normocephalic, atraumatic. MMM  Neck: Supple, Full range of motion   Chest/Pulmonary:  No respiratory distress.  Equal expansion.  Reproducible chest wall tenderness to palpation.   Musculoskeletal: No deformity, no edema, neurovascular intact.   Neuro: Clear speech, appropriate, cooperative, cranial nerves II-XII grossly intact.  Psych: Normal mood and affect      Results for orders placed or performed during the hospital encounter of 11/08/21   CBC w/ Differential   Result Value Ref Range    WBC 7.2 4.8 - 10.8 K/uL    RBC 5.00 4.20 - 5.40 M/uL    Hemoglobin 14.9 12.0 - 16.0 g/dL    Hematocrit 44.3 37.0 - 47.0 %    MCV 88.6 81.4 - 97.8 fL    MCH 29.8 27.0 - 33.0 pg    MCHC 33.6 33.6 - 35.0 g/dL    RDW 43.4 35.9 - 50.0 fL    Platelet Count 329 164 - 446 K/uL    MPV 10.9 9.0 - 12.9 fL    Neutrophils-Polys 58.20 44.00 - 72.00 %    Lymphocytes 33.40 22.00 - 41.00 %    Monocytes 7.00 0.00 - 13.40 %    Eosinophils 0.70 0.00 - 6.90 %    Basophils 0.40 0.00 - 1.80 %    Immature Granulocytes 0.30 0.00 - 0.90 %    Nucleated RBC 0.00 /100 WBC    Neutrophils (Absolute) 4.21 2.00 - 7.15 K/uL    Lymphs (Absolute) 2.42 1.00 - 4.80 K/uL    Monos (Absolute) 0.51 0.00 - 0.85 K/uL    Eos (Absolute) 0.05 0.00 - 0.51 K/uL    Baso (Absolute) 0.03 0.00 - 0.12 K/uL    Immature Granulocytes (abs) 0.02 0.00 - 0.11 K/uL    NRBC (Absolute) 0.00 K/uL   Complete Metabolic Panel (CMP)   Result Value Ref Range    Sodium 139 135 - 145 mmol/L    Potassium 3.9 3.6 - 5.5 mmol/L    Chloride 105 96 - 112 " mmol/L    Co2 23 20 - 33 mmol/L    Anion Gap 11.0 7.0 - 16.0    Glucose 90 65 - 99 mg/dL    Bun 9 8 - 22 mg/dL    Creatinine 0.62 0.50 - 1.40 mg/dL    Calcium 9.4 8.5 - 10.5 mg/dL    AST(SGOT) 13 12 - 45 U/L    ALT(SGPT) 10 2 - 50 U/L    Alkaline Phosphatase 75 30 - 99 U/L    Total Bilirubin 0.5 0.1 - 1.5 mg/dL    Albumin 4.6 3.2 - 4.9 g/dL    Total Protein 7.6 6.0 - 8.2 g/dL    Globulin 3.0 1.9 - 3.5 g/dL    A-G Ratio 1.5 g/dL   Troponin STAT   Result Value Ref Range    Troponin T <6 6 - 19 ng/L   ESTIMATED GFR   Result Value Ref Range    GFR If African American >60 >60 mL/min/1.73 m 2    GFR If Non African American >60 >60 mL/min/1.73 m 2   EKG (NOW)   Result Value Ref Range    Report       [facility] Emergency [facility]    Test Date:  2021  Pt Name:    TIFFANY VYAS                  Department: ER  MRN:        [id number]                      Room:  Gender:     Female                       Technician: [id number]  :        1995                   Requested By:ER TRIAGE PROTOCOL  Order #:    [id number]                    Reading MD:    Measurements  Intervals                                Axis  Rate:       80                           P:          15  NY:         132                          QRS:        48  QRSD:       72                           T:          49  QT:         344  QTc:        397    Interpretive Statements  SINUS RHYTHM  Compared to ECG 2018 05:35:51  Sinus tachycardia no longer present  T-wave abnormality no longer present       CT-CTA CHEST PULMONARY ARTERY W/ RECONS   Final Result      1.  No evidence of pulmonary embolism.      2.  Minimal atelectasis or pneumonia in each lower lobe.      3.  No pleural effusion.      4.  No significant pericardial effusion.      DX-CHEST-LIMITED (1 VIEW)   Final Result      No radiographic evidence of acute cardiopulmonary process.        Ekg;  nsr 80. No st elevation, no st depression, qtc 397.  Compared to ekg from  7/22/18.      PROCEDURES     MEDICAL RECORD  I have reviewed patient's medical record and pertinent results are listed.    COURSE & MEDICAL DECISION MAKING  I have reviewed any medical record information, laboratory studies and radiographic results as noted above.    6:11 PM  The pt is nontoxic appearing, comfortable, and afebrile. Her heart score is zero.  Her ct chest is negative, her enzymes are negative, and she is currently being treated with abx, for her bronchitis.  She also has reproducible chest wall pain tenderness on exam.     FINAL IMPRESSION  1. Costochondritis     2. Chest pain, unspecified type  ibuprofen (MOTRIN) 800 MG Tab         Electronically signed by: Quincy Brasher D.O., 11/8/2021 4:28 PM

## 2022-02-23 ENCOUNTER — HOSPITAL ENCOUNTER (OUTPATIENT)
Dept: RADIOLOGY | Facility: MEDICAL CENTER | Age: 27
End: 2022-02-23
Attending: PSYCHIATRY & NEUROLOGY
Payer: COMMERCIAL

## 2022-02-23 DIAGNOSIS — R90.89 ABNORMAL FINDING ON MRI OF BRAIN: ICD-10-CM

## 2022-02-23 PROCEDURE — 70553 MRI BRAIN STEM W/O & W/DYE: CPT

## 2022-02-23 PROCEDURE — 700117 HCHG RX CONTRAST REV CODE 255: Performed by: PSYCHIATRY & NEUROLOGY

## 2022-02-23 PROCEDURE — A9576 INJ PROHANCE MULTIPACK: HCPCS | Performed by: PSYCHIATRY & NEUROLOGY

## 2022-02-23 RX ADMIN — GADOTERIDOL 15 ML: 279.3 INJECTION, SOLUTION INTRAVENOUS at 08:37

## 2022-02-24 DIAGNOSIS — E55.9 VITAMIN D DEFICIENCY: ICD-10-CM

## 2022-02-24 LAB
25(OH)D3+25(OH)D2 SERPL-MCNC: 22.4 NG/ML (ref 30–100)
VIT B12 SERPL-MCNC: 1817 PG/ML (ref 232–1245)

## 2022-02-24 RX ORDER — ERGOCALCIFEROL 1.25 MG/1
50000 CAPSULE ORAL
Qty: 4 CAPSULE | Refills: 5 | Status: SHIPPED | OUTPATIENT
Start: 2022-02-24 | End: 2022-09-15 | Stop reason: SDUPTHER

## 2022-02-25 ENCOUNTER — TELEPHONE (OUTPATIENT)
Dept: NEUROLOGY | Facility: MEDICAL CENTER | Age: 27
End: 2022-02-25
Payer: COMMERCIAL

## 2022-02-25 NOTE — TELEPHONE ENCOUNTER
Called pt to tell her the following, per Deandra:    Please notify pt to stop taking daily OTC vit D. Rx sent to pharmacy. Take 1 cap weekly for 6 months. Thanks     JH            She said she remembers Deandra saying it was rare that vit D would continue to come back low after taking prescribed vit D. She was wondering if Deandra might know why she is continuously low on vit D. Also, she was wondering if the high vit B was an issue. Please advise.

## 2022-02-28 ENCOUNTER — TELEPHONE (OUTPATIENT)
Dept: NEUROLOGY | Facility: MEDICAL CENTER | Age: 27
End: 2022-02-28
Payer: COMMERCIAL

## 2022-02-28 NOTE — TELEPHONE ENCOUNTER
Called pt and LVM with the following information:      Most of us here in [place] are prone to low vit D. After a high dose supplementation, she needs to take daily vit d to keep it on the normal level. Her anti seizure med can also make her level even lower. I'm not worried with her B vitamin level. Thanks     JH

## 2022-03-28 ENCOUNTER — OFFICE VISIT (OUTPATIENT)
Dept: CARDIOLOGY | Facility: MEDICAL CENTER | Age: 27
End: 2022-03-28
Payer: COMMERCIAL

## 2022-03-28 VITALS
SYSTOLIC BLOOD PRESSURE: 112 MMHG | RESPIRATION RATE: 14 BRPM | HEART RATE: 86 BPM | HEIGHT: 63 IN | DIASTOLIC BLOOD PRESSURE: 82 MMHG | BODY MASS INDEX: 31.89 KG/M2 | WEIGHT: 180 LBS | OXYGEN SATURATION: 93 %

## 2022-03-28 DIAGNOSIS — R07.89 OTHER CHEST PAIN: ICD-10-CM

## 2022-03-28 DIAGNOSIS — R00.2 PALPITATIONS: ICD-10-CM

## 2022-03-28 DIAGNOSIS — I31.39 PERICARDIAL EFFUSION: ICD-10-CM

## 2022-03-28 DIAGNOSIS — Z86.79 HISTORY OF PERICARDITIS: ICD-10-CM

## 2022-03-28 LAB — EKG IMPRESSION: NORMAL

## 2022-03-28 PROCEDURE — 99204 OFFICE O/P NEW MOD 45 MIN: CPT | Mod: 25 | Performed by: STUDENT IN AN ORGANIZED HEALTH CARE EDUCATION/TRAINING PROGRAM

## 2022-03-28 PROCEDURE — 93000 ELECTROCARDIOGRAM COMPLETE: CPT | Performed by: STUDENT IN AN ORGANIZED HEALTH CARE EDUCATION/TRAINING PROGRAM

## 2022-03-28 RX ORDER — BENZONATATE 100 MG/1
CAPSULE ORAL
COMMUNITY
Start: 2022-01-04 | End: 2022-03-28

## 2022-03-28 RX ORDER — AZITHROMYCIN 250 MG/1
TABLET, FILM COATED ORAL
COMMUNITY
End: 2022-03-28

## 2022-03-28 RX ORDER — ONDANSETRON 4 MG/1
TABLET, ORALLY DISINTEGRATING ORAL
COMMUNITY
End: 2022-03-28

## 2022-03-28 RX ORDER — BENZONATATE 100 MG/1
CAPSULE ORAL
COMMUNITY
End: 2022-03-28

## 2022-03-28 RX ORDER — ALBUTEROL SULFATE 90 UG/1
AEROSOL, METERED RESPIRATORY (INHALATION)
COMMUNITY
Start: 2022-01-04 | End: 2022-03-28

## 2022-03-28 ASSESSMENT — ENCOUNTER SYMPTOMS
SYNCOPE: 0
WHEEZING: 0
NAUSEA: 0
PALPITATIONS: 0
DYSPNEA ON EXERTION: 0
DIARRHEA: 0
NEAR-SYNCOPE: 0
NIGHT SWEATS: 0
FOCAL WEAKNESS: 0
ABDOMINAL PAIN: 0
PND: 0
DIZZINESS: 0
FEVER: 0
WEAKNESS: 0
SHORTNESS OF BREATH: 0
COUGH: 0
IRREGULAR HEARTBEAT: 0
VOMITING: 0
ORTHOPNEA: 0

## 2022-03-28 ASSESSMENT — FIBROSIS 4 INDEX: FIB4 SCORE: 0.34

## 2022-03-28 NOTE — PROGRESS NOTES
Cardiology Initial Consultation Note    Date of note:    3/28/2022    Primary Care Provider: TOMMY Draper  Referring Provider: TOMMY Draper    Patient Name: Theodora Ponce   YOB: 1995  MRN:              [id number]    Chief Complaint: Chest pain    History of Present Illness: Ms. Theodora Ponce is a 26 y.o. 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    Of note, the patient had a history of pericarditis in 2018, treated with prednisone taper and also underwent thoracentesis for pleural effusion.  This occurred after having her appendix and gallbladder removed at [facility].  An echocardiogram at the time showed a small pericardial effusion without evidence of hemodynamic compromise.  Since then, the patient had chronic chest pain thought secondary to nerve damage.  The patient has been getting nerve block every few months.  The patient was able to exercise again without any issues.  However, about 2 weeks ago, after she exercise, she started noticing palpitations followed by chest pain.  Chest pain is described as sharp, occurring after exercise.  Chest pain resolved after rest (lasting for about 5 minutes). Since stopping exercise, she has not had any chest pain but she continues to have palpitations a few times a week at night.  She denies any orthopnea, PND, or leg swelling.  No syncope or presyncopal episodes.    Cardiovascular Risk Factors:  1. Smoking status: Never smoker  2. Type II Diabetes Mellitus: None  Lab Results   Component Value Date/Time    HBA1C 5.2 07/22/2018 12:09 AM     3. Hypertension: None  4. Dyslipidemia: No recent labs   Cholesterol,Tot   Date Value Ref Range Status   07/22/2018 102 100 - 199 mg/dL Final     LDL   Date Value Ref Range Status  "  07/22/2018 59 <100 mg/dL Final     HDL   Date Value Ref Range Status   07/22/2018 28 (A) >=40 mg/dL Final     Triglycerides   Date Value Ref Range Status   07/22/2018 76 0 - 149 mg/dL Final     5. Family history of early Coronary Artery Disease in a first degree relative (Male less than 55 years of age; Female less than 65 years of age): None  6.  Obesity and/or Metabolic Syndrome: BMI 31.89  7. Sedentary lifestyle: Active    Review of Systems   Constitutional: Negative for fever, malaise/fatigue and night sweats.   Cardiovascular: Positive for chest pain. Negative for dyspnea on exertion, irregular heartbeat, leg swelling, near-syncope, orthopnea, palpitations, paroxysmal nocturnal dyspnea and syncope.   Respiratory: Negative for cough, shortness of breath and wheezing.    Gastrointestinal: Negative for abdominal pain, diarrhea, nausea and vomiting.   Neurological: Negative for dizziness, focal weakness and weakness.       All other systems reviewed and are negative.       Current Outpatient Medications   Medication Sig Dispense Refill   • ergocalciferol (DRISDOL) 19823 UNIT capsule Take 1 Capsule by mouth every 7 days. 4 Capsule 5   • LamoTRIgine 100 MG TABLET SR 24 HR Take 100 mg by mouth at bedtime. 90 Tablet 3   • ibuprofen (MOTRIN) 800 MG Tab Take 1 Tablet by mouth every 8 hours as needed. 30 Tablet 0   • methocarbamol (ROBAXIN) 500 MG Tab Take  by mouth 4 times a day.       No current facility-administered medications for this visit.         Allergies   Allergen Reactions   • Vicodin [Hydrocodone-Acetaminophen] Hives       Physical Exam:  Ambulatory Vitals  /82 (BP Location: Left arm, Patient Position: Sitting, BP Cuff Size: Adult)   Pulse 86   Resp 14   Ht 1.6 m (5' 3\")   Wt 81.6 kg (180 lb)   SpO2 93%    Oxygen Therapy:  Pulse Oximetry: 93 %  BP Readings from Last 4 Encounters:   03/28/22 112/82   11/09/21 (!) 92/68   11/08/21 119/57   07/23/21 118/72       Weight/BMI: Body mass index is 31.89 " kg/m².  Wt Readings from Last 4 Encounters:   03/28/22 81.6 kg (180 lb)   11/09/21 78.9 kg (173 lb 15.1 oz)   11/08/21 77.9 kg (171 lb 11.8 oz)   07/23/21 82 kg (180 lb 12.4 oz)       General: Well appearing and in no apparent distress  Eyes: nl conjunctiva, no icteric sclera  ENT: wearing a mask, normal external appearance of ears  Neck: no visible JVP,  no carotid bruits  Lungs: normal respiratory effort, CTAB  Heart: RRR, no murmurs, no rubs or gallops,  no edema bilateral lower extremities. No LV/RV heave on cardiac palpatation. + bilateral radial pulses.  + bilateral dp pulses.   Abdomen: soft, non tender, non distended, no masses, normal bowel sounds.  No HSM.  Extremities/MSK: no clubbing, no cyanosis  Neurological: No focal sensory deficits  Psychiatric: Appropriate affect, A/O x 3, intact judgement and insight  Skin: Warm extremities      Lab Data Review:  Lab Results   Component Value Date/Time    CHOLSTRLTOT 102 07/22/2018 03:55 AM    LDL 59 07/22/2018 03:55 AM    HDL 28 (A) 07/22/2018 03:55 AM    TRIGLYCERIDE 76 07/22/2018 03:55 AM       Lab Results   Component Value Date/Time    SODIUM 139 11/08/2021 04:49 PM    POTASSIUM 3.9 11/08/2021 04:49 PM    CHLORIDE 105 11/08/2021 04:49 PM    CO2 23 11/08/2021 04:49 PM    GLUCOSE 90 11/08/2021 04:49 PM    BUN 9 11/08/2021 04:49 PM    CREATININE 0.62 11/08/2021 04:49 PM    CREATININE 1.2 03/17/2009 06:00 PM    BUNCREATRAT 16 02/26/2021 06:45 AM     Lab Results   Component Value Date/Time    ALKPHOSPHAT 75 11/08/2021 04:49 PM    ASTSGOT 13 11/08/2021 04:49 PM    ALTSGPT 10 11/08/2021 04:49 PM    TBILIRUBIN 0.5 11/08/2021 04:49 PM      Lab Results   Component Value Date/Time    WBC 7.2 11/08/2021 04:49 PM     Lab Results   Component Value Date/Time    HBA1C 5.2 07/22/2018 12:09 AM       Cardiac Imaging and Procedures Review:    EKG dated 3/28/2022: My personal interpretation is sinus rhythm, no ST changes    Echo dated 7/22/2018:   CONCLUSIONS  Normal left  ventricular size, wall thickness, and systolic function.  Small pericardial effusion without evidence of hemodynamic compromise.  No significant valve abnormalities.   No prior study is available for comparison.      Assessment & Plan     1. Other chest pain  EKG - Clinic Performed    CRP HIGH SENSITIVE (CARDIAC)    EC-ECHOCARDIOGRAM COMPLETE W/O CONT   2. History of pericarditis  CRP HIGH SENSITIVE (CARDIAC)    Sed Rate   3. Pericardial effusion  CRP HIGH SENSITIVE (CARDIAC)    EC-ECHOCARDIOGRAM COMPLETE W/O CONT   4. Palpitations  Holter Monitor Study    CBC WITH DIFFERENTIAL    Basic Metabolic Panel    TSH WITH REFLEX TO FT4         Shared Medical Decision Making:    Chest pain  History of pericarditis  History of pericardial effusion  Palpitations  Chest pain described as sharp, occurring after exercise.  Patient with history of recurrent pericarditis and pericardial effusion requiring prednisone.  No history of pericardiocentesis but did have thoracentesis in 2018.  -Obtain echocardiogram to assess LVEF, pericardium, and any structural abnormalities given history of pericarditis and current chest pain symptoms  -Obtain ESR and CRP to assess for inflammation.  Discussed possibly starting colchicine -- the patient like to hold off until testing completed, which I think is reasonable.  If ESR and CRP elevated, consider starting colchicine and scheduled NSAID prior to next visit.  -Obtain event monitor to assess for any arrhythmias given symptoms of palpitations  -Obtain CBC, BMP, and TSH to assess for etiologies of palpitations    All of the patient's excellent questions were answered to the best of my knowledge and to her satisfaction.  It was a pleasure seeing Ms. Theodora Ponce in my clinic today. Return in about 6 weeks (around 5/9/2022). Patient is aware to call the cardiology clinic with any questions or concerns.      Marko Ma MD  [facility]  [facility],  [address]  [address] [address]  [place] [address]  Phone: [phone]  Fax: [phone]

## 2022-03-30 LAB
BASOPHILS # BLD AUTO: 0.1 X10E3/UL (ref 0–0.2)
BASOPHILS NFR BLD AUTO: 1 %
BUN SERPL-MCNC: 11 MG/DL (ref 6–20)
BUN/CREAT SERPL: 15 (ref 9–23)
CALCIUM SERPL-MCNC: 9.7 MG/DL (ref 8.7–10.2)
CHLORIDE SERPL-SCNC: 103 MMOL/L (ref 96–106)
CO2 SERPL-SCNC: 19 MMOL/L (ref 20–29)
CREAT SERPL-MCNC: 0.72 MG/DL (ref 0.57–1)
CRP SERPL HS-MCNC: 0.94 MG/L (ref 0–3)
EGFRCR SERPLBLD CKD-EPI 2021: 117 ML/MIN/1.73
EOSINOPHIL # BLD AUTO: 0.1 X10E3/UL (ref 0–0.4)
EOSINOPHIL NFR BLD AUTO: 1 %
ERYTHROCYTE [DISTWIDTH] IN BLOOD BY AUTOMATED COUNT: 13.2 % (ref 11.7–15.4)
ERYTHROCYTE [SEDIMENTATION RATE] IN BLOOD BY WESTERGREN METHOD: 7 MM/HR (ref 0–32)
GLUCOSE SERPL-MCNC: 101 MG/DL (ref 65–99)
HCT VFR BLD AUTO: 45.4 % (ref 34–46.6)
HGB BLD-MCNC: 15.3 G/DL (ref 11.1–15.9)
IMM GRANULOCYTES # BLD AUTO: 0 X10E3/UL (ref 0–0.1)
IMM GRANULOCYTES NFR BLD AUTO: 0 %
IMMATURE CELLS  115398: NORMAL
LYMPHOCYTES # BLD AUTO: 2.5 X10E3/UL (ref 0.7–3.1)
LYMPHOCYTES NFR BLD AUTO: 35 %
MCH RBC QN AUTO: 30.1 PG (ref 26.6–33)
MCHC RBC AUTO-ENTMCNC: 33.7 G/DL (ref 31.5–35.7)
MCV RBC AUTO: 89 FL (ref 79–97)
MONOCYTES # BLD AUTO: 0.6 X10E3/UL (ref 0.1–0.9)
MONOCYTES NFR BLD AUTO: 8 %
MORPHOLOGY BLD-IMP: NORMAL
NEUTROPHILS # BLD AUTO: 4 X10E3/UL (ref 1.4–7)
NEUTROPHILS NFR BLD AUTO: 55 %
NRBC BLD AUTO-RTO: NORMAL %
PLATELET # BLD AUTO: 300 X10E3/UL (ref 150–450)
POTASSIUM SERPL-SCNC: 4.5 MMOL/L (ref 3.5–5.2)
RBC # BLD AUTO: 5.09 X10E6/UL (ref 3.77–5.28)
SODIUM SERPL-SCNC: 137 MMOL/L (ref 134–144)
TSH SERPL DL<=0.005 MIU/L-ACNC: 2.02 UIU/ML (ref 0.45–4.5)
WBC # BLD AUTO: 7.2 X10E3/UL (ref 3.4–10.8)

## 2022-04-01 ENCOUNTER — TELEPHONE (OUTPATIENT)
Dept: CARDIOLOGY | Facility: MEDICAL CENTER | Age: 27
End: 2022-04-01
Payer: COMMERCIAL

## 2022-04-01 NOTE — LETTER
April 1, 2022        Theodora Ponce  [address]  [place] [address]          Dear Theodora,    We have received the results of your recent:    Lab work     Your test came back within normal limits. No abnormalities of concern. Please follow up as previously discussed with your physician.      Feel free to call us with any questions.        Sincerely,          Perla Medical Assistant to Dr. Ma  Electronically Signed

## 2022-04-02 NOTE — TELEPHONE ENCOUNTER
----- Message from Javi Sahni R.N. sent at 3/30/2022  4:55 PM PDT -----  No abnormalities of concern. Follow up as scheduled.

## 2022-04-04 ENCOUNTER — HOSPITAL ENCOUNTER (OUTPATIENT)
Dept: CARDIOLOGY | Facility: MEDICAL CENTER | Age: 27
End: 2022-04-04
Attending: STUDENT IN AN ORGANIZED HEALTH CARE EDUCATION/TRAINING PROGRAM
Payer: COMMERCIAL

## 2022-04-04 ENCOUNTER — NON-PROVIDER VISIT (OUTPATIENT)
Dept: CARDIOLOGY | Facility: MEDICAL CENTER | Age: 27
End: 2022-04-04
Payer: COMMERCIAL

## 2022-04-04 DIAGNOSIS — I49.3 PVCS (PREMATURE VENTRICULAR CONTRACTIONS): ICD-10-CM

## 2022-04-04 DIAGNOSIS — R07.89 OTHER CHEST PAIN: ICD-10-CM

## 2022-04-04 DIAGNOSIS — I47.10 SVT (SUPRAVENTRICULAR TACHYCARDIA) (HCC): ICD-10-CM

## 2022-04-04 DIAGNOSIS — R00.2 PALPITATIONS: ICD-10-CM

## 2022-04-04 DIAGNOSIS — I49.1 PREMATURE ATRIAL CONTRACTIONS: ICD-10-CM

## 2022-04-04 DIAGNOSIS — I31.39 PERICARDIAL EFFUSION: ICD-10-CM

## 2022-04-04 PROCEDURE — 93306 TTE W/DOPPLER COMPLETE: CPT

## 2022-04-04 NOTE — PROGRESS NOTES
Patient enrolled in the 14 day [employer] XT Holter monitoring program, per Marko Ma M.D.    >In clinic hook up, monitor serial #[id number].  >Pending EOS.

## 2022-04-05 LAB
LV EJECT FRACT MOD 2C 99903: 63.58
LV EJECT FRACT MOD 4C 99902: 70.65
LV EJECT FRACT MOD BP 99901: 67.32

## 2022-04-05 PROCEDURE — 93306 TTE W/DOPPLER COMPLETE: CPT | Mod: 26 | Performed by: STUDENT IN AN ORGANIZED HEALTH CARE EDUCATION/TRAINING PROGRAM

## 2022-04-21 ENCOUNTER — TELEPHONE (OUTPATIENT)
Dept: CARDIOLOGY | Facility: MEDICAL CENTER | Age: 27
End: 2022-04-21
Payer: COMMERCIAL

## 2022-04-21 PROCEDURE — 93248 EXT ECG>7D<15D REV&INTERPJ: CPT | Performed by: STUDENT IN AN ORGANIZED HEALTH CARE EDUCATION/TRAINING PROGRAM

## 2022-04-21 PROCEDURE — 93246 EXT ECG>7D<15D RECORDING: CPT | Performed by: STUDENT IN AN ORGANIZED HEALTH CARE EDUCATION/TRAINING PROGRAM

## 2022-05-17 ASSESSMENT — ENCOUNTER SYMPTOMS
COUGH: 0
ABDOMINAL PAIN: 0
SHORTNESS OF BREATH: 0
VOMITING: 0
FOCAL WEAKNESS: 0
FEVER: 0
NIGHT SWEATS: 0
DYSPNEA ON EXERTION: 0
DIARRHEA: 0
SYNCOPE: 0
NAUSEA: 0
WEAKNESS: 0
DIZZINESS: 0
ORTHOPNEA: 0
WHEEZING: 0
NEAR-SYNCOPE: 0
IRREGULAR HEARTBEAT: 0
PALPITATIONS: 0
PND: 0

## 2022-05-17 NOTE — PROGRESS NOTES
Cardiology Follow-up Consultation Note    Date of note:    05/18/22  Primary Care Provider: TOMMY Draper    Patient Name: Theodora Ponce   YOB: 1995  MRN:              [id number]    Chief Complaint: Follow-up chest pain/pericarditis and palpitations    History of Present Illness: Ms. Theodora Ponce is a 26 y.o. female whose current medical problems include seizure, history of pericardial effusion, and history of pericarditis (thought secondary to infectious pericarditis) with recurrence (four times total) who is here for follow-up chest pain/pericarditis and palpitations.    The patient was last seen in my clinic on 3/28/2022 (her initial visit with me). Of note, the patient had a history of pericarditis in 2018, treated with prednisone taper and also underwent thoracentesis for pleural effusion.  This occurred after having her appendix and gallbladder removed at [facility].  An echocardiogram at the time showed a small pericardial effusion without evidence of hemodynamic compromise.  Since then, the patient had chronic chest pain thought secondary to nerve damage.  The patient has been getting nerve block every few months.  During the last visit with me, the patient reported that she was able to exercise without any issues until about 2 weeks prior to the visit when she started noticing sharp pain.  She also reported palpitations a few times a week at night.    CRP and ESR were ordered last visit, which were within normal limits.  We discussed possibly starting colchicine, but decided to hold off until labs were completed.  The patient was ordered an event monitor, which showed 1 run of SVT lasting 3 minutes and 2 seconds, otherwise normal.  The patient was also ordered an echocardiogram, which showed normal LVEF with normal pericardium without effusion.    The patient returns today for follow-up.  The patient reports feeling well today.  She has not exercised; and as such, she has  not had any chest pain episodes.  She denies any shortness of breath.  She denies any orthopnea, PND, or leg swelling.  No palpitations.  No syncope or presyncopal episodes.      Cardiovascular Risk Factors:  1. Smoking status: Never smoker  2. Type II Diabetes Mellitus: None  Lab Results   Component Value Date/Time    HBA1C 5.2 07/22/2018 12:09 AM     3. Hypertension: None  4. Dyslipidemia: No recent labs   Cholesterol,Tot   Date Value Ref Range Status   07/22/2018 102 100 - 199 mg/dL Final     LDL   Date Value Ref Range Status   07/22/2018 59 <100 mg/dL Final     HDL   Date Value Ref Range Status   07/22/2018 28 (A) >=40 mg/dL Final     Triglycerides   Date Value Ref Range Status   07/22/2018 76 0 - 149 mg/dL Final     5. Family history of early Coronary Artery Disease in a first degree relative (Male less than 55 years of age; Female less than 65 years of age): None  6.  Obesity and/or Metabolic Syndrome: BMI 31.89  7. Sedentary lifestyle: Active    Review of Systems   Constitutional: Negative for fever, malaise/fatigue and night sweats.   Cardiovascular: Positive for chest pain. Negative for dyspnea on exertion, irregular heartbeat, leg swelling, near-syncope, orthopnea, palpitations, paroxysmal nocturnal dyspnea and syncope.   Respiratory: Negative for cough, shortness of breath and wheezing.    Gastrointestinal: Negative for abdominal pain, diarrhea, nausea and vomiting.   Neurological: Negative for dizziness, focal weakness and weakness.       All other systems reviewed and are negative.       Current Outpatient Medications   Medication Sig Dispense Refill   • propranolol (INDERAL) 10 MG Tab Take 1 Tablet by mouth 2 times a day. 60 Tablet 11   • ergocalciferol (DRISDOL) 41754 UNIT capsule Take 1 Capsule by mouth every 7 days. 4 Capsule 5   • LamoTRIgine 100 MG TABLET SR 24 HR Take 100 mg by mouth at bedtime. 90 Tablet 3   • ibuprofen (MOTRIN) 800 MG Tab Take 1 Tablet by mouth every 8 hours as needed. 30  "Tablet 0   • methocarbamol (ROBAXIN) 500 MG Tab Take  by mouth 4 times a day.       No current facility-administered medications for this visit.         Allergies   Allergen Reactions   • Vicodin [Hydrocodone-Acetaminophen] Hives       Physical Exam:  Ambulatory Vitals  /60 (BP Location: Left arm, Patient Position: Sitting, BP Cuff Size: Adult)   Pulse (!) 125   Resp 16   Ht 1.6 m (5' 3\")   Wt 82.1 kg (181 lb)   SpO2 95%    Oxygen Therapy:  Pulse Oximetry: 95 %  BP Readings from Last 4 Encounters:   05/18/22 102/60   03/28/22 112/82   11/09/21 (!) 92/68   11/08/21 119/57       Weight/BMI: Body mass index is 32.06 kg/m².  Wt Readings from Last 4 Encounters:   05/18/22 82.1 kg (181 lb)   03/28/22 81.6 kg (180 lb)   11/09/21 78.9 kg (173 lb 15.1 oz)   11/08/21 77.9 kg (171 lb 11.8 oz)       General: Well appearing and in no apparent distress  Eyes: nl conjunctiva, no icteric sclera  ENT: wearing a mask, normal external appearance of ears  Neck: no visible JVP,  no carotid bruits  Lungs: normal respiratory effort, CTAB  Heart: RRR, no murmurs, no rubs or gallops,  no edema bilateral lower extremities. No LV/RV heave on cardiac palpatation. + bilateral radial pulses.  + bilateral dp pulses.   Abdomen: soft, non tender, non distended, no masses, normal bowel sounds.  No HSM.  Extremities/MSK: no clubbing, no cyanosis  Neurological: No focal sensory deficits  Psychiatric: Appropriate affect, A/O x 3, intact judgement and insight  Skin: Warm extremities      Lab Data Review:  Lab Results   Component Value Date/Time    CHOLSTRLTOT 102 07/22/2018 03:55 AM    LDL 59 07/22/2018 03:55 AM    HDL 28 (A) 07/22/2018 03:55 AM    TRIGLYCERIDE 76 07/22/2018 03:55 AM       Lab Results   Component Value Date/Time    SODIUM 137 03/29/2022 05:23 AM    SODIUM 139 11/08/2021 04:49 PM    POTASSIUM 4.5 03/29/2022 05:23 AM    POTASSIUM 3.9 11/08/2021 04:49 PM    CHLORIDE 103 03/29/2022 05:23 AM    CHLORIDE 105 11/08/2021 04:49 PM    " CO2 19 (L) 03/29/2022 05:23 AM    CO2 23 11/08/2021 04:49 PM    GLUCOSE 101 (H) 03/29/2022 05:23 AM    GLUCOSE 90 11/08/2021 04:49 PM    BUN 11 03/29/2022 05:23 AM    BUN 9 11/08/2021 04:49 PM    CREATININE 0.72 03/29/2022 05:23 AM    CREATININE 0.62 11/08/2021 04:49 PM    CREATININE 1.2 03/17/2009 06:00 PM    BUNCREATRAT 15 03/29/2022 05:23 AM     Lab Results   Component Value Date/Time    ALKPHOSPHAT 75 11/08/2021 04:49 PM    ASTSGOT 13 11/08/2021 04:49 PM    ALTSGPT 10 11/08/2021 04:49 PM    TBILIRUBIN 0.5 11/08/2021 04:49 PM      Lab Results   Component Value Date/Time    WBC 7.2 03/29/2022 05:23 AM    WBC 7.2 11/08/2021 04:49 PM     Lab Results   Component Value Date/Time    HBA1C 5.2 07/22/2018 12:09 AM       Cardiac Imaging and Procedures Review:    EKG dated 3/28/2022: My personal interpretation is sinus rhythm, no ST changes    Echo dated 4/5/2022  CONCLUSIONS  Normal left ventricular systolic function. The left ventricular   ejection fraction is visually estimated to be 65%.  The right ventricle is normal in size and systolic function.  No significant valvular abnormalities.  Estimated right ventricular systolic pressure is 25 mmHg.   Normal pericardium without effusion.  Compared to the images of the prior study 07/22/2018, there is no   evidence of pericardial effusion.    Echo dated 7/22/2018:   CONCLUSIONS  Normal left ventricular size, wall thickness, and systolic function.  Small pericardial effusion without evidence of hemodynamic compromise.  No significant valve abnormalities.   No prior study is available for comparison.    Event monitor 4/2022  DOS: 4/4/2022-4/18/2022     Summary:     The patient was monitored for 13 days and 15 hours.  Predominant underlying rhythm was sinus rhythm.  Minimum heart rate 48 bpm, maximum heart rate 182 bpm, and average heart rate 80 bpm.  1 run of supraventricular tachycardia occurred, lasting 3 minutes 2 seconds with a max rate of 162 bpm.  Rare PACs less than  1%.  Rare PVCs less than 1%.  Symptoms correlated with sinus rhythm with heart rate  bpm with PACs and PVCs.     Assessment & Plan     1. Paroxysmal supraventricular tachycardia (HCC)  propranolol (INDERAL) 10 MG Tab   2. Pericardial effusion     3. Other chest pain     4. Palpitations  propranolol (INDERAL) 10 MG Tab   5. History of pericarditis           Shared Medical Decision Making:    Chest pain, resolved  History of pericarditis  History of pericardial effusion  Chest pain described as sharp, occurring after exercise.  Patient with history of recurrent pericarditis and pericardial effusion requiring prednisone.  No history of pericardiocentesis but did have thoracentesis in 2018.  ESR/CRP normal last visit. Echocardiogram 4/2022 showed normal LVEF without any pericardial effusion.    -Treat for paroxysmal SVT as below  -Okay to return to exercise    Palpitations  Paroxysmal SVT  Event monitor showed 1 run of SVT, lasting 3 minutes and 2 seconds. No evidence of anemia.  Thyroid normal.  No evidence of electrolyte abnormalities.   -Start propranolol 10 mg twice daily  -Counseled on lifestyle changes such as avoiding caffeine and increasing fluid intake.    All of the patient's excellent questions were answered to the best of my knowledge and to her satisfaction.  It was a pleasure seeing Ms. Theodora Ponce in my clinic today. Return in about 6 months (around 11/18/2022). Patient is aware to call the cardiology clinic with any questions or concerns.      Marko Ma MD  [facility]  [facility], [address]  [address] [address]  Phone: [phone]  Fax: [phone]

## 2022-05-18 ENCOUNTER — OFFICE VISIT (OUTPATIENT)
Dept: CARDIOLOGY | Facility: MEDICAL CENTER | Age: 27
End: 2022-05-18
Payer: COMMERCIAL

## 2022-05-18 VITALS
WEIGHT: 181 LBS | HEIGHT: 63 IN | DIASTOLIC BLOOD PRESSURE: 60 MMHG | OXYGEN SATURATION: 95 % | RESPIRATION RATE: 16 BRPM | BODY MASS INDEX: 32.07 KG/M2 | SYSTOLIC BLOOD PRESSURE: 102 MMHG | HEART RATE: 125 BPM

## 2022-05-18 DIAGNOSIS — R07.89 OTHER CHEST PAIN: ICD-10-CM

## 2022-05-18 DIAGNOSIS — I31.39 PERICARDIAL EFFUSION: ICD-10-CM

## 2022-05-18 DIAGNOSIS — I47.10 PAROXYSMAL SUPRAVENTRICULAR TACHYCARDIA (HCC): ICD-10-CM

## 2022-05-18 DIAGNOSIS — R00.2 PALPITATIONS: ICD-10-CM

## 2022-05-18 DIAGNOSIS — Z86.79 HISTORY OF PERICARDITIS: ICD-10-CM

## 2022-05-18 LAB — EKG IMPRESSION: NORMAL

## 2022-05-18 PROCEDURE — 93000 ELECTROCARDIOGRAM COMPLETE: CPT | Performed by: STUDENT IN AN ORGANIZED HEALTH CARE EDUCATION/TRAINING PROGRAM

## 2022-05-18 PROCEDURE — 99214 OFFICE O/P EST MOD 30 MIN: CPT | Mod: 25 | Performed by: STUDENT IN AN ORGANIZED HEALTH CARE EDUCATION/TRAINING PROGRAM

## 2022-05-18 RX ORDER — PROPRANOLOL HYDROCHLORIDE 10 MG/1
10 TABLET ORAL 2 TIMES DAILY
Qty: 60 TABLET | Refills: 11 | Status: SHIPPED | OUTPATIENT
Start: 2022-05-18 | End: 2022-06-13

## 2022-05-18 ASSESSMENT — FIBROSIS 4 INDEX: FIB4 SCORE: 0.37

## 2022-05-19 ENCOUNTER — RESEARCH ENCOUNTER (OUTPATIENT)
Dept: MEDICAL GROUP | Facility: PHYSICIAN GROUP | Age: 27
End: 2022-05-19
Payer: COMMERCIAL

## 2022-05-19 DIAGNOSIS — Z00.6 RESEARCH STUDY PATIENT: ICD-10-CM

## 2022-06-11 DIAGNOSIS — R00.2 PALPITATIONS: ICD-10-CM

## 2022-06-11 DIAGNOSIS — I47.10 PAROXYSMAL SUPRAVENTRICULAR TACHYCARDIA (HCC): ICD-10-CM

## 2022-06-14 RX ORDER — PROPRANOLOL HYDROCHLORIDE 10 MG/1
TABLET ORAL
Qty: 180 TABLET | Refills: 3 | Status: SHIPPED | OUTPATIENT
Start: 2022-06-14 | End: 2022-11-18

## 2022-07-07 LAB
APOB+LDLR+PCSK9 GENE MUT ANL BLD/T: NOT DETECTED
BRCA1+BRCA2 DEL+DUP + FULL MUT ANL BLD/T: NOT DETECTED
MLH1+MSH2+MSH6+PMS2 GN DEL+DUP+FUL M: NOT DETECTED

## 2022-08-24 DIAGNOSIS — E55.9 VITAMIN D DEFICIENCY: ICD-10-CM

## 2022-09-15 RX ORDER — ERGOCALCIFEROL 1.25 MG/1
CAPSULE ORAL
Qty: 12 CAPSULE | Refills: 1 | Status: SHIPPED
Start: 2022-09-15 | End: 2023-08-31

## 2022-11-07 ENCOUNTER — TELEPHONE (OUTPATIENT)
Dept: NEUROLOGY | Facility: MEDICAL CENTER | Age: 27
End: 2022-11-07
Payer: COMMERCIAL

## 2022-11-08 ENCOUNTER — TELEPHONE (OUTPATIENT)
Dept: NEUROLOGY | Facility: MEDICAL CENTER | Age: 27
End: 2022-11-08
Payer: COMMERCIAL

## 2022-11-08 NOTE — TELEPHONE ENCOUNTER
Received request via: Patient    Was the patient seen in the last year in this department? No    Does the patient have an active prescription (recently filled or refills available) for medication(s) requested? No      Pt was with [facility] and did not get dale off 11/8 appt. Moved to [facility] 2/13. Needs refills on epilepsy scripts to bridge until that date.[facility]

## 2022-11-09 ENCOUNTER — APPOINTMENT (OUTPATIENT)
Dept: NEUROLOGY | Facility: MEDICAL CENTER | Age: 27
End: 2022-11-09
Payer: COMMERCIAL

## 2022-11-17 ASSESSMENT — ENCOUNTER SYMPTOMS
SHORTNESS OF BREATH: 0
PALPITATIONS: 0
DYSPNEA ON EXERTION: 0
PND: 0
VOMITING: 0
NAUSEA: 0
IRREGULAR HEARTBEAT: 0
WEAKNESS: 0
SYNCOPE: 0
FEVER: 0
ABDOMINAL PAIN: 0
FOCAL WEAKNESS: 0
WHEEZING: 0
DIARRHEA: 0
NIGHT SWEATS: 0
DIZZINESS: 0
NEAR-SYNCOPE: 0
ORTHOPNEA: 0
COUGH: 0

## 2022-11-18 ENCOUNTER — OFFICE VISIT (OUTPATIENT)
Dept: CARDIOLOGY | Facility: MEDICAL CENTER | Age: 27
End: 2022-11-18
Payer: COMMERCIAL

## 2022-11-18 VITALS
RESPIRATION RATE: 12 BRPM | SYSTOLIC BLOOD PRESSURE: 102 MMHG | BODY MASS INDEX: 32.96 KG/M2 | WEIGHT: 186 LBS | HEIGHT: 63 IN | HEART RATE: 79 BPM | DIASTOLIC BLOOD PRESSURE: 60 MMHG | OXYGEN SATURATION: 98 %

## 2022-11-18 DIAGNOSIS — R07.89 OTHER CHEST PAIN: ICD-10-CM

## 2022-11-18 DIAGNOSIS — I31.39 PERICARDIAL EFFUSION: ICD-10-CM

## 2022-11-18 DIAGNOSIS — Z86.79 HISTORY OF PERICARDITIS: ICD-10-CM

## 2022-11-18 DIAGNOSIS — R00.2 PALPITATIONS: ICD-10-CM

## 2022-11-18 DIAGNOSIS — I47.10 PAROXYSMAL SUPRAVENTRICULAR TACHYCARDIA (HCC): ICD-10-CM

## 2022-11-18 PROCEDURE — 99214 OFFICE O/P EST MOD 30 MIN: CPT | Performed by: STUDENT IN AN ORGANIZED HEALTH CARE EDUCATION/TRAINING PROGRAM

## 2022-11-18 ASSESSMENT — FIBROSIS 4 INDEX: FIB4 SCORE: 0.38

## 2022-11-18 NOTE — PROGRESS NOTES
Cardiology Follow-up Consultation Note    Date of note:    11/18/22  Primary Care Provider: TOMMY Draper    Patient Name: Theodora Ponce   YOB: 1995  MRN:              [id number]    Chief Complaint: Follow-up chest pain/pericarditis and palpitations    History of Present Illness: Ms. Theodora Ponce is a 27 y.o. female whose current medical problems include seizure, history of pericardial effusion, and history of pericarditis (thought secondary to infectious pericarditis) with recurrence (four times total) who is here for follow-up chest pain/pericarditis and palpitations.    The patient was last seen in my clinic on 05/18/22.  The patient was doing well during the last visit.  And she was started on propranolol for paroxysmal SVT.    Of note, the patient had a history of pericarditis in 2018, treated with prednisone taper and also underwent thoracentesis for pleural effusion.  This occurred after having her appendix and gallbladder removed at [facility].  An echocardiogram at the time showed a small pericardial effusion without evidence of hemodynamic compromise.  Since then, the patient had chronic chest pain thought secondary to nerve damage.  The patient has been getting nerve block every few months.  When the patient had an episode of recurrent sharp chest pain, CRP and ESR were ordered, which were normal.  We discussed possibly starting colchicine but decided to hold off.  An echocardiogram 4/2022 showed normal LVEF without pericardial effusion.    The patient returns today for follow-up.  She reports that propranolol made her feel anxious, and has not taken it since.  She continues to have occasional sharp chest pain episodes whenever her heart rate goes above 115.  She denies any shortness of breath.  No orthopnea, PND, or leg swelling.  No palpitations.  No syncope or presyncopal episodes.      Cardiovascular Risk Factors:  1. Smoking status: Never smoker  2. Type II Diabetes  "Mellitus: None  Lab Results   Component Value Date/Time    HBA1C 5.2 07/22/2018 12:09 AM     3. Hypertension: None  4. Dyslipidemia: No recent labs   Cholesterol,Tot   Date Value Ref Range Status   07/22/2018 102 100 - 199 mg/dL Final     LDL   Date Value Ref Range Status   07/22/2018 59 <100 mg/dL Final     HDL   Date Value Ref Range Status   07/22/2018 28 (A) >=40 mg/dL Final     Triglycerides   Date Value Ref Range Status   07/22/2018 76 0 - 149 mg/dL Final     5. Family history of early Coronary Artery Disease in a first degree relative (Male less than 55 years of age; Female less than 65 years of age): None  6.  Obesity and/or Metabolic Syndrome: BMI 31.89  7. Sedentary lifestyle: Active    Review of Systems   Constitutional: Negative for fever, malaise/fatigue and night sweats.   Cardiovascular:  Positive for chest pain. Negative for dyspnea on exertion, irregular heartbeat, leg swelling, near-syncope, orthopnea, palpitations, paroxysmal nocturnal dyspnea and syncope.   Respiratory:  Negative for cough, shortness of breath and wheezing.    Gastrointestinal:  Negative for abdominal pain, diarrhea, nausea and vomiting.   Neurological:  Negative for dizziness, focal weakness and weakness.     All other systems reviewed and are negative.       Current Outpatient Medications   Medication Sig Dispense Refill    vitamin D2, Ergocalciferol, (DRISDOL) 1.25 MG (16302 UT) Cap capsule TAKE 1 CAPSULE BY MOUTH ONE TIME PER WEEK 12 Capsule 1    ibuprofen (MOTRIN) 800 MG Tab Take 1 Tablet by mouth every 8 hours as needed. 30 Tablet 0    methocarbamol (ROBAXIN) 500 MG Tab Take  by mouth 4 times a day.       No current facility-administered medications for this visit.         Allergies   Allergen Reactions    Vicodin [Hydrocodone-Acetaminophen] Hives       Physical Exam:  Ambulatory Vitals  /60 (BP Location: Left arm, Patient Position: Sitting, BP Cuff Size: Adult)   Pulse 79   Resp 12   Ht 1.6 m (5' 3\")   Wt 84.4 kg " (186 lb)   SpO2 98%    Oxygen Therapy:  Pulse Oximetry: 98 %  BP Readings from Last 4 Encounters:   11/18/22 102/60   05/18/22 102/60   03/28/22 112/82   11/09/21 (!) 92/68       Weight/BMI: Body mass index is 32.95 kg/m².  Wt Readings from Last 4 Encounters:   11/18/22 84.4 kg (186 lb)   05/18/22 82.1 kg (181 lb)   03/28/22 81.6 kg (180 lb)   11/09/21 78.9 kg (173 lb 15.1 oz)       General: Well appearing and in no apparent distress  Eyes: nl conjunctiva, no icteric sclera  ENT: wearing a mask, normal external appearance of ears  Neck: no visible JVP,  no carotid bruits  Lungs: normal respiratory effort, CTAB  Heart: RRR, no murmurs, no rubs or gallops,  no edema bilateral lower extremities. No LV/RV heave on cardiac palpatation. + bilateral radial pulses.  + bilateral dp pulses.   Abdomen: soft, non tender, non distended, no masses, normal bowel sounds.  No HSM.  Extremities/MSK: no clubbing, no cyanosis  Neurological: No focal sensory deficits  Psychiatric: Appropriate affect, A/O x 3, intact judgement and insight  Skin: Warm extremities      Lab Data Review:  Lab Results   Component Value Date/Time    CHOLSTRLTOT 102 07/22/2018 03:55 AM    LDL 59 07/22/2018 03:55 AM    HDL 28 (A) 07/22/2018 03:55 AM    TRIGLYCERIDE 76 07/22/2018 03:55 AM       Lab Results   Component Value Date/Time    SODIUM 137 03/29/2022 05:23 AM    SODIUM 139 11/08/2021 04:49 PM    POTASSIUM 4.5 03/29/2022 05:23 AM    POTASSIUM 3.9 11/08/2021 04:49 PM    CHLORIDE 103 03/29/2022 05:23 AM    CHLORIDE 105 11/08/2021 04:49 PM    CO2 19 (L) 03/29/2022 05:23 AM    CO2 23 11/08/2021 04:49 PM    GLUCOSE 101 (H) 03/29/2022 05:23 AM    GLUCOSE 90 11/08/2021 04:49 PM    BUN 11 03/29/2022 05:23 AM    BUN 9 11/08/2021 04:49 PM    CREATININE 0.72 03/29/2022 05:23 AM    CREATININE 0.62 11/08/2021 04:49 PM    CREATININE 1.2 03/17/2009 06:00 PM    BUNCREATRAT 15 03/29/2022 05:23 AM     Lab Results   Component Value Date/Time    ALKPHOSPHAT 75 11/08/2021  04:49 PM    ASTSGOT 13 11/08/2021 04:49 PM    ALTSGPT 10 11/08/2021 04:49 PM    TBILIRUBIN 0.5 11/08/2021 04:49 PM      Lab Results   Component Value Date/Time    WBC 7.2 03/29/2022 05:23 AM    WBC 7.2 11/08/2021 04:49 PM     Lab Results   Component Value Date/Time    HBA1C 5.2 07/22/2018 12:09 AM       Cardiac Imaging and Procedures Review:    EKG dated 3/28/2022: My personal interpretation is sinus rhythm, no ST changes    Echo dated 4/5/2022  CONCLUSIONS  Normal left ventricular systolic function. The left ventricular   ejection fraction is visually estimated to be 65%.  The right ventricle is normal in size and systolic function.  No significant valvular abnormalities.  Estimated right ventricular systolic pressure is 25 mmHg.   Normal pericardium without effusion.  Compared to the images of the prior study 07/22/2018, there is no   evidence of pericardial effusion.    Echo dated 7/22/2018:   CONCLUSIONS  Normal left ventricular size, wall thickness, and systolic function.  Small pericardial effusion without evidence of hemodynamic compromise.  No significant valve abnormalities.   No prior study is available for comparison.    Event monitor 4/2022  DOS: 4/4/2022-4/18/2022     Summary:     The patient was monitored for 13 days and 15 hours.  Predominant underlying rhythm was sinus rhythm.  Minimum heart rate 48 bpm, maximum heart rate 182 bpm, and average heart rate 80 bpm.  1 run of supraventricular tachycardia occurred, lasting 3 minutes 2 seconds with a max rate of 162 bpm.  Rare PACs less than 1%.  Rare PVCs less than 1%.  Symptoms correlated with sinus rhythm with heart rate  bpm with PACs and PVCs.     Assessment & Plan     1. Other chest pain  Cardiac Stress Test Treadmill Only      2. History of pericarditis        3. Pericardial effusion        4. Palpitations        5. Paroxysmal supraventricular tachycardia (HCC)                Shared Medical Decision Making:    Atypical chest pain  History of  pericarditis  History of pericardial effusion  Chest pain described as sharp, occurring after exercise.  Patient with history of recurrent pericarditis and pericardial effusion requiring prednisone.  No history of pericardiocentesis but did have thoracentesis in 2018.  ESR/CRP normal. Echocardiogram 4/2022 showed normal LVEF without any pericardial effusion.  Chest pain possibly related to nerve pain.  -We will plan for treadmill EKG to assess for any arrhythmias or ischemia and BP response to exercise    Palpitations, improved  Paroxysmal SVT  Event monitor showed 1 run of SVT, lasting 3 minutes and 2 seconds. No evidence of anemia.  Thyroid normal.  No evidence of electrolyte abnormalities.   -Patient did not tolerate propranolol  -Counseled on lifestyle changes such as avoiding caffeine and increasing fluid intake.  -If symptoms worsen, can consider other beta-blockers.    All of the patient's excellent questions were answered to the best of my knowledge and to her satisfaction.  It was a pleasure seeing Ms. Theodora Ponce in my clinic today. Return in about 6 months (around 5/18/2023). Patient is aware to call the cardiology clinic with any questions or concerns.      Marko Ma MD  [facility]  [facility], [address]  [address] [address]  Phone: [phone]  Fax: [phone]

## 2022-12-01 ENCOUNTER — APPOINTMENT (OUTPATIENT)
Dept: RADIOLOGY | Facility: MEDICAL CENTER | Age: 27
End: 2022-12-01
Attending: STUDENT IN AN ORGANIZED HEALTH CARE EDUCATION/TRAINING PROGRAM
Payer: COMMERCIAL

## 2022-12-15 ENCOUNTER — APPOINTMENT (OUTPATIENT)
Dept: RADIOLOGY | Facility: MEDICAL CENTER | Age: 27
End: 2022-12-15
Attending: STUDENT IN AN ORGANIZED HEALTH CARE EDUCATION/TRAINING PROGRAM
Payer: COMMERCIAL

## 2023-01-02 ENCOUNTER — OFFICE VISIT (OUTPATIENT)
Dept: URGENT CARE | Facility: CLINIC | Age: 28
End: 2023-01-02
Payer: COMMERCIAL

## 2023-01-02 VITALS
TEMPERATURE: 97.4 F | HEIGHT: 63 IN | SYSTOLIC BLOOD PRESSURE: 108 MMHG | HEART RATE: 101 BPM | BODY MASS INDEX: 31.89 KG/M2 | WEIGHT: 180 LBS | DIASTOLIC BLOOD PRESSURE: 76 MMHG | RESPIRATION RATE: 14 BRPM | OXYGEN SATURATION: 97 %

## 2023-01-02 DIAGNOSIS — R05.1 ACUTE COUGH: ICD-10-CM

## 2023-01-02 PROBLEM — R20.2 PARESTHESIA OF LOWER EXTREMITY: Status: ACTIVE | Noted: 2022-12-09

## 2023-01-02 PROBLEM — M25.551 PAIN OF RIGHT HIP JOINT: Status: ACTIVE | Noted: 2022-11-21

## 2023-01-02 PROBLEM — M54.50 LOW BACK PAIN: Status: ACTIVE | Noted: 2022-11-21

## 2023-01-02 PROBLEM — M54.16 LUMBAR RADICULOPATHY: Status: ACTIVE | Noted: 2022-11-21

## 2023-01-02 PROCEDURE — 99213 OFFICE O/P EST LOW 20 MIN: CPT | Performed by: PHYSICIAN ASSISTANT

## 2023-01-02 RX ORDER — BENZONATATE 100 MG/1
100 CAPSULE ORAL 3 TIMES DAILY PRN
Qty: 60 CAPSULE | Refills: 0 | Status: SHIPPED | OUTPATIENT
Start: 2023-01-02 | End: 2023-03-06

## 2023-01-02 RX ORDER — METHYLPREDNISOLONE 4 MG/1
4 TABLET ORAL DAILY
Qty: 21 TABLET | Refills: 0 | Status: SHIPPED | OUTPATIENT
Start: 2023-01-02 | End: 2023-03-27

## 2023-01-02 RX ORDER — COVID-19 MOLECULAR TEST ASSAY
KIT MISCELLANEOUS
COMMUNITY
Start: 2022-12-28 | End: 2023-01-02

## 2023-01-02 RX ORDER — VALACYCLOVIR HYDROCHLORIDE 500 MG/1
TABLET, FILM COATED ORAL
COMMUNITY
End: 2023-01-02

## 2023-01-02 RX ORDER — LAMOTRIGINE 100 MG/1
1 TABLET, EXTENDED RELEASE ORAL
COMMUNITY
Start: 2022-12-13 | End: 2023-01-02

## 2023-01-02 RX ORDER — PREDNISONE 20 MG/1
TABLET ORAL
COMMUNITY
End: 2023-01-02

## 2023-01-02 RX ORDER — LAMOTRIGINE 100 MG/1
TABLET, EXTENDED RELEASE ORAL
COMMUNITY
End: 2023-04-03 | Stop reason: SDUPTHER

## 2023-01-02 RX ORDER — PREDNISONE 20 MG/1
20 TABLET ORAL
COMMUNITY
Start: 2022-11-21 | End: 2023-01-02

## 2023-01-02 RX ORDER — VALACYCLOVIR HYDROCHLORIDE 500 MG/1
TABLET, FILM COATED ORAL
COMMUNITY
Start: 2022-12-17 | End: 2023-01-02

## 2023-01-02 ASSESSMENT — ENCOUNTER SYMPTOMS
EYE REDNESS: 0
FEVER: 1
CHILLS: 0
ABDOMINAL PAIN: 0
SHORTNESS OF BREATH: 0
VOMITING: 0
DIAPHORESIS: 0
HEADACHES: 0
SINUS PAIN: 0
DIARRHEA: 1
EYE PAIN: 0
EYE DISCHARGE: 0
COUGH: 1
NAUSEA: 0
DIZZINESS: 0
SORE THROAT: 1
CONSTIPATION: 0
WHEEZING: 0

## 2023-01-02 ASSESSMENT — FIBROSIS 4 INDEX: FIB4 SCORE: 0.38

## 2023-01-02 NOTE — PROGRESS NOTES
"  Subjective:     Theodora Ponce  is a 27 y.o. female who presents for Fever (X4days, cough, sore throat, mucus, diarrhea, loss of voice, negative covid test, )       Presents today with fever, cough, sore throat x4 days.  Associated diarrhea.  She did had a negative COVID test.  Denies any recent\" sick contacts.  No history of asthma but does note previous history of pericarditis.  She did initially have a fever during the first few days of symptom onset but this has resolved.  No chest pain or shortness of breath, no nausea or vomiting.  Has been taking over-the-counter medications and Mucinex for her symptoms.     Review of Systems   Constitutional:  Positive for fever. Negative for chills, diaphoresis and malaise/fatigue.   HENT:  Positive for sore throat. Negative for congestion, ear discharge and sinus pain.    Eyes:  Negative for pain, discharge and redness.   Respiratory:  Positive for cough. Negative for shortness of breath and wheezing.    Cardiovascular:  Negative for chest pain.   Gastrointestinal:  Positive for diarrhea. Negative for abdominal pain, constipation, nausea and vomiting.   Genitourinary:  Negative for dysuria, frequency and urgency.   Neurological:  Negative for dizziness and headaches.    Allergies   Allergen Reactions    Vicodin [Hydrocodone-Acetaminophen] Hives    Hydrocodone-Acetaminophen Hives     Past Medical History:   Diagnosis Date    Pericarditis 05/2018    Seizure disorder (HCC)     Tonsillitis         Objective:   /76   Pulse (!) 101   Temp 36.3 °C (97.4 °F) (Temporal)   Resp 14   Ht 1.6 m (5' 3\")   Wt 81.6 kg (180 lb)   SpO2 97%   BMI 31.89 kg/m²   Physical Exam  Vitals and nursing note reviewed.   Constitutional:       General: She is not in acute distress.     Appearance: Normal appearance. She is not ill-appearing, toxic-appearing or diaphoretic.   HENT:      Head: Normocephalic.      Right Ear: Tympanic membrane, ear canal and external ear normal. There is no " impacted cerumen.      Left Ear: Tympanic membrane, ear canal and external ear normal. There is no impacted cerumen.      Nose: No congestion or rhinorrhea.      Mouth/Throat:      Mouth: Mucous membranes are moist.      Pharynx: No oropharyngeal exudate or posterior oropharyngeal erythema.   Eyes:      General:         Right eye: No discharge.         Left eye: No discharge.      Conjunctiva/sclera: Conjunctivae normal.   Cardiovascular:      Rate and Rhythm: Normal rate and regular rhythm.   Pulmonary:      Effort: Pulmonary effort is normal. No respiratory distress.      Breath sounds: Normal breath sounds. No stridor. No wheezing or rhonchi.   Musculoskeletal:      Cervical back: Neck supple.   Lymphadenopathy:      Cervical: No cervical adenopathy.   Neurological:      General: No focal deficit present.      Mental Status: She is alert and oriented to person, place, and time.   Psychiatric:         Mood and Affect: Mood normal.         Behavior: Behavior normal.         Thought Content: Thought content normal.         Judgment: Judgment normal.           Diagnostic testing: None    Assessment/Plan:     Encounter Diagnoses   Name Primary?    Acute cough           Plan for care for today's complaint includes Medrol Dosepak and Tessalon Perles for her acute cough.  At this time symptoms are likely viral in nature, no evidence to support antibiotic use at this time.  Patient is well-appearing vital signs are stable.  Continue to monitor symptoms and return to urgent care or follow-up with primary care provider if symptoms remain ongoing.  Follow-up in the emergency department if symptoms become severe, ER precautions discussed in office today..  Prescription for Medrol Dosepak, Tessalon Perles provided.    See AVS Instructions below for written guidance provided to patient on after-visit management and care in addition to our verbal discussion during the visit.    Please note that this dictation was created using  voice recognition software. I have attempted to correct all errors, but there may be sound-alike, spelling, grammar and possibly content errors that I did not discover before finalizing the note.    Alexiseron Schultz PA-C

## 2023-03-06 ENCOUNTER — OFFICE VISIT (OUTPATIENT)
Dept: URGENT CARE | Facility: PHYSICIAN GROUP | Age: 28
End: 2023-03-06
Payer: COMMERCIAL

## 2023-03-06 VITALS
SYSTOLIC BLOOD PRESSURE: 104 MMHG | OXYGEN SATURATION: 99 % | WEIGHT: 180 LBS | DIASTOLIC BLOOD PRESSURE: 70 MMHG | HEIGHT: 63 IN | HEART RATE: 90 BPM | TEMPERATURE: 97.7 F | RESPIRATION RATE: 16 BRPM | BODY MASS INDEX: 31.89 KG/M2

## 2023-03-06 DIAGNOSIS — J01.00 ACUTE NON-RECURRENT MAXILLARY SINUSITIS: ICD-10-CM

## 2023-03-06 DIAGNOSIS — R05.1 ACUTE COUGH: ICD-10-CM

## 2023-03-06 PROCEDURE — 99214 OFFICE O/P EST MOD 30 MIN: CPT | Performed by: PHYSICIAN ASSISTANT

## 2023-03-06 RX ORDER — BENZONATATE 200 MG/1
200 CAPSULE ORAL 3 TIMES DAILY PRN
Qty: 60 CAPSULE | Refills: 0 | Status: SHIPPED | OUTPATIENT
Start: 2023-03-06 | End: 2023-03-27

## 2023-03-06 RX ORDER — FLUTICASONE PROPIONATE 50 MCG
1 SPRAY, SUSPENSION (ML) NASAL DAILY
Qty: 16 G | Refills: 0 | Status: SHIPPED | OUTPATIENT
Start: 2023-03-06 | End: 2023-03-27

## 2023-03-06 RX ORDER — AMOXICILLIN AND CLAVULANATE POTASSIUM 875; 125 MG/1; MG/1
1 TABLET, FILM COATED ORAL 2 TIMES DAILY
Qty: 14 TABLET | Refills: 0 | Status: SHIPPED | OUTPATIENT
Start: 2023-03-06 | End: 2023-03-13

## 2023-03-06 ASSESSMENT — ENCOUNTER SYMPTOMS
COUGH: 1
HEADACHES: 1
SINUS PRESSURE: 1
SORE THROAT: 1
HOARSE VOICE: 1

## 2023-03-06 ASSESSMENT — FIBROSIS 4 INDEX: FIB4 SCORE: 0.38

## 2023-03-06 NOTE — PROGRESS NOTES
Subjective:   Theodora Ponce is a 27 y.o. female who presents for URI (X 5 days with mild sore throat that went away. Been having runny nose with bloody discharge, deep cough with thick yellow phlegm)        Sinusitis  This is a new problem. The current episode started in the past 7 days. The problem has been gradually worsening since onset. Maximum temperature: subjective fever day 1- resolved. The pain is moderate. Associated symptoms include congestion (Yellow and bloody mucopurulent nasal discharge.), coughing, ear pain, headaches, a hoarse voice, sinus pressure and a sore throat. Treatments tried: zquil, mucinex, cough drops and throat sprays. The treatment provided no relief.   Review of Systems   HENT:  Positive for congestion (Yellow and bloody mucopurulent nasal discharge.), ear pain, hoarse voice, sinus pressure and sore throat.    Respiratory:  Positive for cough.    Neurological:  Positive for headaches.     PMH:  has a past medical history of Pericarditis (05/2018), Seizure disorder ([facility]), and Tonsillitis.  MEDS:   Current Outpatient Medications:     amoxicillin-clavulanate (AUGMENTIN) 875-125 MG Tab, Take 1 Tablet by mouth 2 times a day for 7 days., Disp: 14 Tablet, Rfl: 0    fluticasone (FLONASE) 50 MCG/ACT nasal spray, Administer 1 Spray into affected nostril(S) every day., Disp: 16 g, Rfl: 0    benzonatate (TESSALON) 200 MG capsule, Take 1 Capsule by mouth 3 times a day as needed for Cough., Disp: 60 Capsule, Rfl: 0    LamoTRIgine 100 MG TABLET SR 24 HR, lamotrigine  mg tablet,extended release 24 hr  TAKE 1 TABLET BY MOUTH EVERYDAY AT BEDTIME, Disp: , Rfl:     vitamin D2, Ergocalciferol, (DRISDOL) 1.25 MG (39698 UT) Cap capsule, TAKE 1 CAPSULE BY MOUTH ONE TIME PER WEEK, Disp: 12 Capsule, Rfl: 1    methocarbamol (ROBAXIN) 500 MG Tab, Take  by mouth 4 times a day., Disp: , Rfl:     methylPREDNISolone (MEDROL DOSEPAK) 4 MG Tablet Therapy Pack, Take 1 Tablet by mouth every day. Follow schedule  "on package instructions. (Patient not taking: Reported on 3/6/2023), Disp: 21 Tablet, Rfl: 0  ALLERGIES:   Allergies   Allergen Reactions    Vicodin [Hydrocodone-Acetaminophen] Hives    Hydrocodone-Acetaminophen Hives     SURGHX:   Past Surgical History:   Procedure Laterality Date    CHOLECYSTECTOMY  05/2018    APPENDECTOMY  04/2018    TONSILLECTOMY  2/24/2010    Performed by YULIYA PEDERSEN at [facility]     SOCHX:  reports that she has never smoked. She has never used smokeless tobacco. She reports current alcohol use. She reports that she does not use drugs.  FH: Family history was reviewed, no pertinent findings to report   Objective:   /70 (BP Location: Right arm, Patient Position: Sitting, BP Cuff Size: Adult)   Pulse 90   Temp 36.5 °C (97.7 °F) (Temporal)   Resp 16   Ht 1.6 m (5' 3\")   Wt 81.6 kg (180 lb)   SpO2 99%   BMI 31.89 kg/m²   Physical Exam  Vitals reviewed.   Constitutional:       General: She is not in acute distress.     Appearance: Normal appearance. She is well-developed. She is not toxic-appearing.   HENT:      Head: Normocephalic and atraumatic.      Right Ear: Tympanic membrane, ear canal and external ear normal.      Left Ear: Tympanic membrane, ear canal and external ear normal.      Nose: Congestion and rhinorrhea present. Rhinorrhea is purulent and bloody.      Right Sinus: Maxillary sinus tenderness present.      Left Sinus: Maxillary sinus tenderness present.      Mouth/Throat:      Lips: Pink.      Mouth: Mucous membranes are moist.      Pharynx: Oropharynx is clear. Uvula midline.   Cardiovascular:      Rate and Rhythm: Normal rate and regular rhythm.      Heart sounds: Normal heart sounds, S1 normal and S2 normal.   Pulmonary:      Effort: Pulmonary effort is normal. No respiratory distress.      Breath sounds: Normal breath sounds. No stridor. No decreased breath sounds, wheezing, rhonchi or rales.   Skin:     General: Skin is dry.   Neurological: "      Comments: Alert and oriented.    Psychiatric:         Speech: Speech normal.         Behavior: Behavior normal.         Assessment/Plan:   1. Acute non-recurrent maxillary sinusitis  - amoxicillin-clavulanate (AUGMENTIN) 875-125 MG Tab; Take 1 Tablet by mouth 2 times a day for 7 days.  Dispense: 14 Tablet; Refill: 0  - fluticasone (FLONASE) 50 MCG/ACT nasal spray; Administer 1 Spray into affected nostril(S) every day.  Dispense: 16 g; Refill: 0    2. Acute cough  - benzonatate (TESSALON) 200 MG capsule; Take 1 Capsule by mouth 3 times a day as needed for Cough.  Dispense: 60 Capsule; Refill: 0    Considerations include but not limited to sinusitis, viral URI, bronchitis, pneumonia.    History and exam today consistent with sinusitis.    Pt instructed to complete full course of medication despite symptomatic improvement. Pt to take med with meals to alleviate GI upset. Pt to take a probiotic or eat Ampar[employer]’s yogurt/ kefir while taking the medication.    Flonase 1 squirt in each nostril, as instructed in clinic, once a day.  Use nasal saline TID to promote drainage.   Salt water gurgles to soothe sore throat.  Ayr saline gel to moisturize nasal passage and prevent bleeding.  Try to use sudafed sparingly in order to allow sinuses to drain.  Avoid the longer formulations and try to take only in the morning and/or at noon if needed.  If you fail to improve in 3-5 days or symptoms worsen/new symptoms develop, RTC for reevaluation    Differential diagnosis, natural history, supportive care, and indications for immediate follow-up discussed.

## 2023-03-07 ENCOUNTER — TELEPHONE (OUTPATIENT)
Dept: NEUROLOGY | Facility: MEDICAL CENTER | Age: 28
End: 2023-03-07
Payer: COMMERCIAL

## 2023-03-07 NOTE — TELEPHONE ENCOUNTER
NEUROLOGY PATIENT PRE-VISIT PLANNING     Patient was NOT contacted to complete PVP.    Patient Appointment is scheduled as: New Patient     Is visit type and length scheduled correctly? Yes    [facility] Patient Demographics? Yes    3.   Is referral attached to visit? Yes    4. Were records received from referring provider? Yes, patient has been seen in clinic previously so records are in Epic.     4. Patient was NOT contacted to have someone accompany them to visit.     5. Is this appointment scheduled as a Hospital Follow-Up?  No    6. Does the patient require any pre procedure or post procedure follow up? No    7. If any orders were placed at last visit or intended to be done for this visit do we have Results/Consult Notes? Yes  Labs - Labs ordered, completed on 02/24/22  and results are in chart.  Imaging - Imaging was not ordered at last office visit.  Referrals - No referrals were ordered at last office visit.    8. If patient appointment is for Botox - is order pended for provider? N/A

## 2023-03-16 ENCOUNTER — APPOINTMENT (OUTPATIENT)
Dept: NEUROLOGY | Facility: MEDICAL CENTER | Age: 28
End: 2023-03-16
Attending: PSYCHIATRY & NEUROLOGY
Payer: COMMERCIAL

## 2023-03-27 ENCOUNTER — OFFICE VISIT (OUTPATIENT)
Dept: URGENT CARE | Facility: CLINIC | Age: 28
End: 2023-03-27
Payer: COMMERCIAL

## 2023-03-27 VITALS
RESPIRATION RATE: 14 BRPM | BODY MASS INDEX: 31.89 KG/M2 | DIASTOLIC BLOOD PRESSURE: 66 MMHG | HEART RATE: 74 BPM | WEIGHT: 180 LBS | OXYGEN SATURATION: 100 % | TEMPERATURE: 98.1 F | SYSTOLIC BLOOD PRESSURE: 102 MMHG | HEIGHT: 63 IN

## 2023-03-27 DIAGNOSIS — L03.211 CELLULITIS OF FACE: ICD-10-CM

## 2023-03-27 PROCEDURE — 99213 OFFICE O/P EST LOW 20 MIN: CPT | Performed by: PHYSICIAN ASSISTANT

## 2023-03-27 RX ORDER — VALACYCLOVIR HYDROCHLORIDE 500 MG/1
TABLET, FILM COATED ORAL
COMMUNITY
Start: 2023-02-21

## 2023-03-27 RX ORDER — CEPHALEXIN 500 MG/1
500 CAPSULE ORAL 4 TIMES DAILY
Qty: 28 CAPSULE | Refills: 0 | Status: SHIPPED | OUTPATIENT
Start: 2023-03-27 | End: 2023-04-03

## 2023-03-27 ASSESSMENT — ENCOUNTER SYMPTOMS
COUGH: 0
SORE THROAT: 0
VOMITING: 0
EYE PAIN: 0
CHILLS: 0
WHEEZING: 0
SINUS PAIN: 0
FEVER: 0
CONSTIPATION: 0
HEADACHES: 0
DIAPHORESIS: 0
DIZZINESS: 0
DIARRHEA: 0
ABDOMINAL PAIN: 0
EYE REDNESS: 0
FLANK PAIN: 0
SHORTNESS OF BREATH: 0
NAUSEA: 0
EYE DISCHARGE: 0

## 2023-03-27 ASSESSMENT — FIBROSIS 4 INDEX: FIB4 SCORE: 0.38

## 2023-03-27 NOTE — PROGRESS NOTES
"  Subjective:     Theodora Ponce  is a 27 y.o. female who presents for Eye Problem ((R) x yesterday started with bump under her eyebrow and now is having pressure in eye lid.  No vision changes.)       She presents today with new onset right-sided facial swelling that began yesterday.  States that symptoms began as a small red bump present over the medial inferior border of the eyebrow that did self express a clear exudative fluid.  Since that time she has been experiencing spreading of pain from the lesion into the medial border of the eye and into the medial border of the zygomatic arch.  There is direct tenderness over the areas of erythema.  No change in vision or blurry vision.  Patient is having discomfort over the medial border of the eye near the tear duct, no pain with EOMs.  No other lesions noted, no pain of the ear, no pain of the other portions of the face.     Review of Systems   Constitutional:  Negative for chills, diaphoresis, fever and malaise/fatigue.   HENT:  Negative for congestion, ear discharge, sinus pain and sore throat.    Eyes:  Negative for pain, discharge and redness.   Respiratory:  Negative for cough, shortness of breath and wheezing.    Cardiovascular:  Negative for chest pain.   Gastrointestinal:  Negative for abdominal pain, constipation, diarrhea, nausea and vomiting.   Genitourinary:  Negative for dysuria, flank pain, frequency and urgency.   Skin:         Erythematous lump present over the medial inferior border of the eyebrow.   Neurological:  Negative for dizziness and headaches.    Allergies   Allergen Reactions    Vicodin [Hydrocodone-Acetaminophen] Hives    Hydrocodone-Acetaminophen Hives     Past Medical History:   Diagnosis Date    Pericarditis 05/2018    Seizure disorder ([facility])     Tonsillitis         Objective:   /66   Pulse 74   Temp 36.7 °C (98.1 °F) (Temporal)   Resp 14   Ht 1.6 m (5' 3\")   Wt 81.6 kg (180 lb)   LMP 03/26/2023   SpO2 100%   BMI 31.89 " kg/m²   Physical Exam  Vitals and nursing note reviewed.   Constitutional:       General: She is not in acute distress.     Appearance: She is not ill-appearing or toxic-appearing.   HENT:      Head: Normocephalic.        Comments: Examination of the face does reveal a small erythematous lesion that is point tender over the above marked region over the inferior/medial portion of the eyebrow with erythema extending in the inferior direction (red arrow).  No other lesions, no grouped vesicles, no pain following a dermatomal distribution.     Right Ear: Tympanic membrane, ear canal and external ear normal.      Left Ear: Tympanic membrane, ear canal and external ear normal.      Ears:      Comments: No vesicles or lesion within the external canals     Nose: No rhinorrhea.      Mouth/Throat:      Mouth: Mucous membranes are moist.      Pharynx: No oropharyngeal exudate or posterior oropharyngeal erythema.   Eyes:      General: No scleral icterus.        Right eye: No discharge.         Left eye: No discharge.      Extraocular Movements: Extraocular movements intact.      Conjunctiva/sclera: Conjunctivae normal.      Pupils: Pupils are equal, round, and reactive to light.   Pulmonary:      Effort: Pulmonary effort is normal. No respiratory distress.      Breath sounds: No stridor.   Musculoskeletal:      Cervical back: Neck supple.   Neurological:      Mental Status: She is alert and oriented to person, place, and time.   Psychiatric:         Mood and Affect: Mood normal.         Behavior: Behavior normal.         Thought Content: Thought content normal.         Judgment: Judgment normal.           Diagnostic testing: None    Assessment/Plan:     Encounter Diagnoses   Name Primary?    Cellulitis of face           Plan for care for today's complaint includes Keflex for cellulitis of the face.  Continue facial cleansing techniques as she is she has been.  Discussed signs and symptoms of worsening infection, signs and  symptoms of orbital cellulitis and signs and symptoms of shingles infection of the face, if these do arise then return immediately for reevaluation.  No evidence to support orbital cellulitis or shingles of the face based on physical exam findings today..  Prescription for Keflex provided.    See AVS Instructions below for written guidance provided to patient on after-visit management and care in addition to our verbal discussion during the visit.    Please note that this dictation was created using voice recognition software. I have attempted to correct all errors, but there may be sound-alike, spelling, grammar and possibly content errors that I did not discover before finalizing the note.    Alexiseron Schultz PA-C

## 2023-04-03 ENCOUNTER — OFFICE VISIT (OUTPATIENT)
Dept: NEUROLOGY | Facility: MEDICAL CENTER | Age: 28
End: 2023-04-03
Attending: PSYCHIATRY & NEUROLOGY
Payer: COMMERCIAL

## 2023-04-03 VITALS
SYSTOLIC BLOOD PRESSURE: 120 MMHG | HEART RATE: 77 BPM | DIASTOLIC BLOOD PRESSURE: 64 MMHG | TEMPERATURE: 97.1 F | OXYGEN SATURATION: 98 % | WEIGHT: 182.5 LBS | BODY MASS INDEX: 32.34 KG/M2 | HEIGHT: 63 IN

## 2023-04-03 DIAGNOSIS — R56.9 SEIZURES (HCC): ICD-10-CM

## 2023-04-03 PROCEDURE — 99417 PROLNG OP E/M EACH 15 MIN: CPT | Performed by: PSYCHIATRY & NEUROLOGY

## 2023-04-03 PROCEDURE — 99215 OFFICE O/P EST HI 40 MIN: CPT | Performed by: PSYCHIATRY & NEUROLOGY

## 2023-04-03 PROCEDURE — 99211 OFF/OP EST MAY X REQ PHY/QHP: CPT | Performed by: PSYCHIATRY & NEUROLOGY

## 2023-04-03 RX ORDER — LAMOTRIGINE 100 MG/1
100 TABLET, EXTENDED RELEASE ORAL
Qty: 90 TABLET | Refills: 3 | Status: SHIPPED | OUTPATIENT
Start: 2023-04-03 | End: 2024-03-04 | Stop reason: SDUPTHER

## 2023-04-03 ASSESSMENT — FIBROSIS 4 INDEX: FIB4 SCORE: 0.38

## 2023-04-03 ASSESSMENT — PATIENT HEALTH QUESTIONNAIRE - PHQ9: CLINICAL INTERPRETATION OF PHQ2 SCORE: 0

## 2023-04-03 NOTE — PATIENT INSTRUCTIONS
Please continue lamotrigine SR24 100 mg daily with no changes.    Please continue vitamin D supplementation.    Please let our office know if you have any changes in your seizure frequency and/characteristics. Otherwise, please keep the diary of your events and bring it with you at the time of your next follow up visit with our office.     Please take folic acid 1 mg daily. This is an over-the-counter supplement that is recommended to prevent certain developmental problems in your baby, in case you become pregnant in the future.    Please let our office know as soon as you become pregnant or plan to become pregnant.    If you are caring for a baby/young child, please make sure to be sitting on a soft surface while holding your baby/young child, so in case you have a seizure, your baby/young child is not injured due to fall.     Please let us know if you observe that your baby is excessively sleepy/has other changes and the pediatrician feels that there are no other explanations except possible adverse effects of antiseizure medication(s) your are taking while nursing your baby.     Please note that the following might precipitate seizures: missed doses of antiseizure medications, being sick with fever, stress, fatigue, sleep deprivation, not eating regularly, not drinking enough water, drinking too much alcohol, stopping alcohol suddenly if you are currently using it on a regular/daily basis, and/or using recreational drugs, among others.    Please note that the following might lead to an injury, potentially a life-threatening injury, in case you have a seizure and/or lose awareness while:   - being in a large body of water by yourself, such as bath, pool, lake, ocean, among others (risk of drowning)   - being on unprotected heights (risk of fall)   - being around and/or operating heavy machinery (risk of injury)   - being around open fire/hot surfaces (risk of burns)   - any other activities/circumstances, in which  if you lose awareness, you might injure yourself and/or others.    Please call for help (crisis line and/or 911) in case you have thoughts of harming yourself and/or others.    Please abstain from driving until further notice.    Due to the high volume of patients we are trying to help, your physician will not be able to respond by phone or in MyChart to your routine concerns between appointments.  This does not reflect a lack of interest or concern for you or your diagnosis.  Please bring these questions and concerns to your appointment where your physician can answer.  Please relay more pressing concerns to our office, either via MyChart, or by phone; if not able to reach us please visit nearby Urgent Care Center or Emergency Department.  If any emergent medical needs, please seek emergent medical help and/or call 911.    Please note that we are not able to fill out paperwork that might be related to your work, utility company, disability, and/or driving, among others, in between the visits.  Please schedule a dedicated appointment to address your paperwork, so we can do that in a timely manner.  This is not due to lack of concern or interest for your disease-related work/administrative problems, but to make sure that we provide the best possible care and to fill out your paperwork in a correct and timely manner.    Thank you for entrusting your neurological care to [facility] and we look forward to continuing to serve you.

## 2023-04-03 NOTE — PROGRESS NOTES
"[facility] Epilepsy Program  New Patient Visit      Patient's Name: Theodora Ponce  YOB: 1995  MRN: [id number]  Date of Service: 04/03/2023    Referring Provider: Onofre Torres M.D.  [address] [place] [address]  [place] [id number]    Chief Concern: Seizures and right frontal lobe T2 hyperintensities noted on MRI brain    HPI: The patient is a 27 y.o., right-handed female, who initially presented to my clinic for evaluation of seizures on 04/03/2023.    The patient was in her usual health until 2018, when she had appendicitis, followed by cholecystitis, followed by pleuritis, and then finally pericarditis; she was evaluated for an autoimmune disorder, but was never diagnosed with any particular autoimmune disorder. She was treated with steroids at that time and eventually got better. She eventually was evaluated at [facility] in [place], but that was at the time when her inflammation subsided.     During this period, she had her first bilateral tonic-clonic seizure, as noted under event type #1, and afterwards started having spells described under the event type #2.She was initially tried on Keppra, but due to mood issues switched to lamotrigine, and eventually to lamotrigine extended release. She had no adverse effects to lamotrigine - no skin rash, no insomnia, no dizziness/balance issues.     Due to some non-specific white matter changes noted on MRI brain, she was evaluated for MS, but she never had symptoms consistent with MS. She never had vision changes, Lhermitte's sign, bladder/bowel incontinence, difficulties walking, nor persistent excessive fatigue. She has been getting yearly MRI studies.     She recently completed PT for her lower back pain and sciatica and is doing fine at this time; no weakness, numbness, back pain, bladder, nor bowel incontinence.     She does have intermittent work finding difficulties since 2018.     Semiology:    Event type #1: \"Seizures\".  Year/Age " "of Onset: 2018 (age 23)  Initial features: She feels fatigued before going to bed. All her seizures occurred out of sleep.   Event features: Bilateral tonic-clonic seizures, with no clear lateralization. Tongue bite, but no bladder/bowel incontinence. She does not have a recollection for the time during the event and she is not responsive during the event.   Post-ictal features: Angry and has to urinate right after a seizure. Confused and exhausted.   Duration: On average 1 minute, but one was 3.5 minutes.   Frequency: The first event was in August 2018. The second was 2 hours later. The third event occurred in December 2019.   Precipitating factors: Being off antiseizure medication (the last event was precipitated by being off Keppra).     Event type #2: \"Spells of metallic taste\".  Year/Age of Onset: 2018 (age 23)  Initial features: Metallic tasted in her mouth (like blood).  Event features: She experiences some nausea, heart racing, sweating, feels lighted, feels like something is coming over her, feels numb, has to sit down and \"breathe through it\". No confusion, no psychic phenomena, and no loss of time.   Post-ictal features: Feels a bit exhausted for a couple hours.   Duration: Seconds to minutes for most of her symptoms, but metallic taste and nausea up to couple hours.   Frequency: She used to have twice a month; she had none since she was switched to lamotrigine extended release around 2020.   Precipitating factors: Not clear.     History of status epilepticus: no  History of physical injury related to seizures: no  History of surgery related to epilepsy: no  Family Planning: No plans for pregnancy.  Current Driving Status: She does not have a 's license and does not drive.   Seizure Clusters: No  Longest Seizure Freedom: No seizures since December 2019.     Pertinent Ancillary Test Results:    MRI brain studies:   - MRI brain wo/w contrast (02/17/2021 at [facility]): \"IMPRESSION: 1. A couple " "nonspecific tiny T2 hyperintense foci in the right frontal periventricular white matter which are indeterminate and could represent nonspecific gliosis although cannot exclude tiny chronic demyelinating lesions. No abnormal enhancement.   2. No evidence of mass lesion, heterotopic gray matter, gross cortical dysplasia or mesial temporal sclerosis.     - MRI brain wo/w contrast (02/23/2022 at [facility]): \"No acute process. Stable appearance of tiny T2 hyperintense lesions in the right frontal region as described above, which may represent chronic demyelinating lesions. No new lesions are seen. No abnormal enhancement is identified.\"     - MRI c-spine w/wo contrast (04/06/2021 @ [facility]): \"IMPRESSION:   1.  No abnormal cervical cord signal or enhancement.  2.  Minimal spondylosis. No spinal or foraminal stenosis.     MRI t-spine w/wo contrast [place] [facility] [place] \"IMPRESSION:     1.  No abnormal thoracic cord signal or enhancement.  2.  T6-T7 small posterior disc extrusion.  3.  No significant thoracic spinal or foraminal stenosis.    EEG studies:   - Standard EEG (02/19/2021 Dr. Haque): This is a normal routine EEG recording in the awake, drowsy/sleep state(s).  Clinical correlation is recommended. Note: A normal EEG does not rule out epilepsy.  If the clinical suspicion remains high for seizures, a prolonged recording to capture clinical or subclinical events may be helpful.    Current Antiseizure Medications: Lamotrigine  mg daily.     Previously Tried Antiseizure Medications: Lamotrigine 100 mg daily. Keppra (had severe mood issues, suicidal ideation, and somnolence).     Review of Systems: As above.    Risk Factors For Epilepsy/Seizure Disorder: The patient is a product of premature pregnancy,  and the delivery was complicated with hypoxia due to cord wrapped around her neck; she was a blue baby. The early development was normal and the patient started waking, talking, and engaging in " "social interaction as expected. There were no challenges during school and no reported attendance of special education programs. There is no history of head trauma. There is no history of stroke. There is no history of CNS infections, such as encephalitis and/or meningitis. There is no personal history of febrile seizures. There is no history of neurosurgical interventions. There is no reported history of staring spells during childhood/school years.    Past Medical History:  Past Medical History:   Diagnosis Date    Pericarditis 05/2018    Seizure disorder (HCC)     Tonsillitis      Past Surgical History:  Past Surgical History:   Procedure Laterality Date    CHOLECYSTECTOMY  05/2018    APPENDECTOMY  04/2018    TONSILLECTOMY  2/24/2010    Performed by YULIYA PEDERSEN at SURGERY SAME DAY [facility] ORS      Social History:  Social History     Tobacco Use    Smoking status: Never    Smokeless tobacco: Never   Vaping Use    Vaping Use: Never used   Substance Use Topics    Alcohol use: Yes     Comment: occ    Drug use: No     Family History:  There is known family history of febrile seizures - her sister and her sister's son had febrile seizures.    Allergies:  Allergies   Allergen Reactions    Vicodin [Hydrocodone-Acetaminophen] Hives    Hydrocodone-Acetaminophen Hives     Current Medications:    Current Outpatient Medications:     valACYclovir, TAKE 4 TABLETS AT FIRST SIGN OF ATTACK AND THEN TAKE 4 TABLETS 12 HOURS LATER, PRN    LamoTRIgine, lamotrigine  mg tablet,extended release 24 hr  TAKE 1 TABLET BY MOUTH EVERYDAY AT BEDTIME, Taking    vitamin D2 (Ergocalciferol), TAKE 1 CAPSULE BY MOUTH ONE TIME PER WEEK, Taking    methocarbamol, Take  by mouth 4 times a day., PRN    Physical Examination:    Ambulatory Vitals  Encounter Vitals  Temperature: 36.2 °C (97.1 °F)  Temp src: Temporal  Blood Pressure: 120/64  Pulse: 77  Pulse Oximetry: 98 %  Weight: 82.8 kg (182 lb 8 oz)  Height: 160 cm (5' 3\")  BMI " (Calculated): 32.33    Cardiac ausculation and lung ausculation were unremarkable.     Neurological Examination:  Mental Status: The patient is alert and oriented to person, place, time, and situation. Speech is fluent, with no aphasia nor dysarthria noted. Affect is normal.    Cranial Nerve Examination:  CN I: Olfaction examination is deferred.  CN II: Visual fields are full to confrontation examination and show no visual field defect.   CN III, IV, VI: Eye movements are normal in all directions. Pupils are reactive to direct and consensual light. There is no relative afferent pupillary defect. There is no nystagmus.  CN V: Facial sensation to light touch is intact throughout.   CN VII: No significant facial muscle or other soft tissue asymmetry.  CN VIII: Hearing intact to rubbing sounds bilaterally.   CN IX, X: Soft palate elevates symmetrically.  CN XI: Symmetrical shoulder shrug exam.  CN XII: Midline tongue protrusion and moves symmetrically to each side.     Motor Examination:  Muscle strength is intact (5/5) throughout. Muscle tone is normal throughout. No abnormal movements are observed. No pronator drift is noted.     Muscle Stretch Reflexes Examination:  Muscle stretch reflexes are normal (2+) throughout and symmetric.  Babinski reflex is absent bilaterally.  Ankle clonus is absent bilaterally.     Sensory Examination:  Preserved sensation to light touch in all extremities.     Coordination:  Normal finger to nose testing bilaterally, no postural nor intentional tremor was noted.     Stance/gait:  Normal regular gait with normal arm swings and stride length. Able to perform tandem gait. Romberg sign is absent.     Labs:   - no recent CBC and CMP    ASSESSMENT AND PLAN:    1. 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 The etiology is most likely related to the reported hypoxia at birth in context of umbilical cord  being around her neck; she also has family history of febrile seizures. The systemic inflammatory event in 2018 was likely a precipitating factor.  She is doing well on lamotrigine at this time and has no adverse effects from it.  - LamoTRIgine 100 MG TABLET SR 24 HR; Take 100 mg by mouth at bedtime.  Dispense: 90 Tablet; Refill: 3  - CBC WITHOUT DIFFERENTIAL; Future  - Comp Metabolic Panel; Future  - lamotrigine level  - continue vitamin D supplementation  - no plans for pregnancy nor driving at this time.   - discussed in detail seizure precautions and seizure-precipitating factors    2. Concern for demyelinating disorder.  In context of her non-specific, right frontal, white matter lesions, a concern was raised that she has an MS or a similar demyelinating disorder. Her MRI brain imaging remained stable and her MRI c-spine and t-spine showed no demyelinating lesions.  - we reviewed symptoms of MS and she has none; her exam was normal as well.  - in this context, we will do yearly MRI brain w/wo contrast for now, and follow her closely clinically:   - MR-BRAIN-WITH & W/O; Future    3. Lower back pain and sciatica (history of)  - no symptoms nor signs of myelopathy at this time  - we had a discussion that if she experience any new neurological symptoms, such as new pain in her neck/back, weakness/numbness, difficulties walking, and/or bladder/bowel incontinence, among others, to seek emergent medical help; the patient verbalized understanding and agreement.   - she should also continue to follow up with spine specialist as needed.    Patient Instructions   Please continue lamotrigine SR24 100 mg daily with no changes.    Please continue vitamin D supplementation.    Please let our office know if you have any changes in your seizure frequency and/characteristics. Otherwise, please keep the diary of your events and bring it with you at the time of your next follow up visit with our office.     Please take folic acid  1 mg daily. This is an over-the-counter supplement that is recommended to prevent certain developmental problems in your baby, in case you become pregnant in the future.    Please let our office know as soon as you become pregnant or plan to become pregnant.    If you are caring for a baby/young child, please make sure to be sitting on a soft surface while holding your baby/young child, so in case you have a seizure, your baby/young child is not injured due to fall.     Please let us know if you observe that your baby is excessively sleepy/has other changes and the pediatrician feels that there are no other explanations except possible adverse effects of antiseizure medication(s) your are taking while nursing your baby.     Please note that the following might precipitate seizures: missed doses of antiseizure medications, being sick with fever, stress, fatigue, sleep deprivation, not eating regularly, not drinking enough water, drinking too much alcohol, stopping alcohol suddenly if you are currently using it on a regular/daily basis, and/or using recreational drugs, among others.    Please note that the following might lead to an injury, potentially a life-threatening injury, in case you have a seizure and/or lose awareness while:   - being in a large body of water by yourself, such as bath, pool, lake, ocean, among others (risk of drowning)   - being on unprotected heights (risk of fall)   - being around and/or operating heavy machinery (risk of injury)   - being around open fire/hot surfaces (risk of burns)   - any other activities/circumstances, in which if you lose awareness, you might injure yourself and/or others.    Please call for help (crisis line and/or 911) in case you have thoughts of harming yourself and/or others.    Please abstain from driving until further notice.    Due to the high volume of patients we are trying to help, your physician will not be able to respond by phone or in [facility] [employer] to your  routine concerns between appointments.  This does not reflect a lack of interest or concern for you or your diagnosis.  Please bring these questions and concerns to your appointment where your physician can answer.  Please relay more pressing concerns to our office, either via MyChart, or by phone; if not able to reach us please visit nearby Urgent Care Center or Emergency Department.  If any emergent medical needs, please seek emergent medical help and/or call 911.    Please note that we are not able to fill out paperwork that might be related to your work, utility company, disability, and/or driving, among others, in between the visits.  Please schedule a dedicated appointment to address your paperwork, so we can do that in a timely manner.  This is not due to lack of concern or interest for your disease-related work/administrative problems, but to make sure that we provide the best possible care and to fill out your paperwork in a correct and timely manner.    Thank you for entrusting your neurological care to [facility] and we look forward to continuing to serve you.     Follow up in 3 months.     My total time spent caring for the patient on the day of the encounter was 61 minutes.   This does not include time spent on separately billable procedures/tests.    Marvin De Jesus MD  Neurology Attending, Epilepsy Program  [facility]

## 2023-05-15 ENCOUNTER — TELEPHONE (OUTPATIENT)
Dept: CARDIOLOGY | Facility: MEDICAL CENTER | Age: 28
End: 2023-05-15
Payer: COMMERCIAL

## 2023-05-15 NOTE — TELEPHONE ENCOUNTER
Called pt regards to pending stress test HK ordered previous visit. Pt did not answer, LVM providing call back number.

## 2023-05-30 ENCOUNTER — TELEPHONE (OUTPATIENT)
Dept: CARDIOLOGY | Facility: MEDICAL CENTER | Age: 28
End: 2023-05-30
Payer: COMMERCIAL

## 2023-05-30 NOTE — TELEPHONE ENCOUNTER
DR BATISTA    Spoke with pt about missed appt 5/24 with Dr Ma, said she needs to cb and make appt. Wants to call imaging and get her stress test done first.

## 2023-07-10 ENCOUNTER — APPOINTMENT (OUTPATIENT)
Dept: NEUROLOGY | Facility: MEDICAL CENTER | Age: 28
End: 2023-07-10
Attending: PSYCHIATRY & NEUROLOGY
Payer: COMMERCIAL

## 2023-08-14 ENCOUNTER — APPOINTMENT (OUTPATIENT)
Dept: NEUROLOGY | Facility: MEDICAL CENTER | Age: 28
End: 2023-08-14
Attending: PSYCHIATRY & NEUROLOGY
Payer: COMMERCIAL

## 2023-08-15 ENCOUNTER — HOSPITAL ENCOUNTER (EMERGENCY)
Facility: MEDICAL CENTER | Age: 28
End: 2023-08-15
Attending: EMERGENCY MEDICINE
Payer: COMMERCIAL

## 2023-08-15 ENCOUNTER — APPOINTMENT (OUTPATIENT)
Dept: RADIOLOGY | Facility: MEDICAL CENTER | Age: 28
End: 2023-08-15
Attending: EMERGENCY MEDICINE
Payer: COMMERCIAL

## 2023-08-15 VITALS
TEMPERATURE: 97.1 F | WEIGHT: 180 LBS | BODY MASS INDEX: 31.89 KG/M2 | DIASTOLIC BLOOD PRESSURE: 59 MMHG | HEART RATE: 86 BPM | SYSTOLIC BLOOD PRESSURE: 111 MMHG | HEIGHT: 63 IN | RESPIRATION RATE: 16 BRPM | OXYGEN SATURATION: 98 %

## 2023-08-15 DIAGNOSIS — R07.9 CHEST PAIN, UNSPECIFIED TYPE: ICD-10-CM

## 2023-08-15 LAB
ALBUMIN SERPL BCP-MCNC: 5 G/DL (ref 3.2–4.9)
ALBUMIN/GLOB SERPL: 1.9 G/DL
ALP SERPL-CCNC: 75 U/L (ref 30–99)
ALT SERPL-CCNC: 15 U/L (ref 2–50)
ANION GAP SERPL CALC-SCNC: 8 MMOL/L (ref 7–16)
AST SERPL-CCNC: 17 U/L (ref 12–45)
BASOPHILS # BLD AUTO: 0.5 % (ref 0–1.8)
BASOPHILS # BLD: 0.05 K/UL (ref 0–0.12)
BILIRUB SERPL-MCNC: 0.6 MG/DL (ref 0.1–1.5)
BUN SERPL-MCNC: 8 MG/DL (ref 8–22)
CALCIUM ALBUM COR SERPL-MCNC: 9 MG/DL (ref 8.5–10.5)
CALCIUM SERPL-MCNC: 9.8 MG/DL (ref 8.5–10.5)
CHLORIDE SERPL-SCNC: 103 MMOL/L (ref 96–112)
CO2 SERPL-SCNC: 27 MMOL/L (ref 20–33)
CREAT SERPL-MCNC: 0.75 MG/DL (ref 0.5–1.4)
CRP SERPL HS-MCNC: 1.76 MG/DL (ref 0–0.75)
D DIMER PPP IA.FEU-MCNC: 0.42 UG/ML (FEU) (ref 0–0.5)
EKG IMPRESSION: NORMAL
EOSINOPHIL # BLD AUTO: 0.03 K/UL (ref 0–0.51)
EOSINOPHIL NFR BLD: 0.3 % (ref 0–6.9)
ERYTHROCYTE [DISTWIDTH] IN BLOOD BY AUTOMATED COUNT: 42.4 FL (ref 35.9–50)
ERYTHROCYTE [SEDIMENTATION RATE] IN BLOOD BY WESTERGREN METHOD: 4 MM/HOUR (ref 0–25)
GFR SERPLBLD CREATININE-BSD FMLA CKD-EPI: 111 ML/MIN/1.73 M 2
GLOBULIN SER CALC-MCNC: 2.6 G/DL (ref 1.9–3.5)
GLUCOSE SERPL-MCNC: 91 MG/DL (ref 65–99)
HCG SERPL QL: NEGATIVE
HCT VFR BLD AUTO: 43.6 % (ref 37–47)
HGB BLD-MCNC: 14.5 G/DL (ref 12–16)
IMM GRANULOCYTES # BLD AUTO: 0.04 K/UL (ref 0–0.11)
IMM GRANULOCYTES NFR BLD AUTO: 0.4 % (ref 0–0.9)
LYMPHOCYTES # BLD AUTO: 2.39 K/UL (ref 1–4.8)
LYMPHOCYTES NFR BLD: 22.9 % (ref 22–41)
MCH RBC QN AUTO: 29.3 PG (ref 27–33)
MCHC RBC AUTO-ENTMCNC: 33.3 G/DL (ref 32.2–35.5)
MCV RBC AUTO: 88.1 FL (ref 81.4–97.8)
MONOCYTES # BLD AUTO: 0.75 K/UL (ref 0–0.85)
MONOCYTES NFR BLD AUTO: 7.2 % (ref 0–13.4)
NEUTROPHILS # BLD AUTO: 7.17 K/UL (ref 1.82–7.42)
NEUTROPHILS NFR BLD: 68.7 % (ref 44–72)
NRBC # BLD AUTO: 0 K/UL
NRBC BLD-RTO: 0 /100 WBC (ref 0–0.2)
PLATELET # BLD AUTO: 293 K/UL (ref 164–446)
PMV BLD AUTO: 10.9 FL (ref 9–12.9)
POTASSIUM SERPL-SCNC: 3.7 MMOL/L (ref 3.6–5.5)
PROT SERPL-MCNC: 7.6 G/DL (ref 6–8.2)
RBC # BLD AUTO: 4.95 M/UL (ref 4.2–5.4)
SODIUM SERPL-SCNC: 138 MMOL/L (ref 135–145)
TROPONIN T SERPL-MCNC: <6 NG/L (ref 6–19)
WBC # BLD AUTO: 10.4 K/UL (ref 4.8–10.8)

## 2023-08-15 PROCEDURE — 99285 EMERGENCY DEPT VISIT HI MDM: CPT

## 2023-08-15 PROCEDURE — 93005 ELECTROCARDIOGRAM TRACING: CPT | Performed by: EMERGENCY MEDICINE

## 2023-08-15 PROCEDURE — 93005 ELECTROCARDIOGRAM TRACING: CPT

## 2023-08-15 PROCEDURE — 96374 THER/PROPH/DIAG INJ IV PUSH: CPT

## 2023-08-15 PROCEDURE — 36415 COLL VENOUS BLD VENIPUNCTURE: CPT

## 2023-08-15 PROCEDURE — 86140 C-REACTIVE PROTEIN: CPT

## 2023-08-15 PROCEDURE — 85652 RBC SED RATE AUTOMATED: CPT

## 2023-08-15 PROCEDURE — 85379 FIBRIN DEGRADATION QUANT: CPT

## 2023-08-15 PROCEDURE — 700111 HCHG RX REV CODE 636 W/ 250 OVERRIDE (IP): Performed by: EMERGENCY MEDICINE

## 2023-08-15 PROCEDURE — 80053 COMPREHEN METABOLIC PANEL: CPT

## 2023-08-15 PROCEDURE — 85025 COMPLETE CBC W/AUTO DIFF WBC: CPT

## 2023-08-15 PROCEDURE — 84703 CHORIONIC GONADOTROPIN ASSAY: CPT

## 2023-08-15 PROCEDURE — 71045 X-RAY EXAM CHEST 1 VIEW: CPT

## 2023-08-15 PROCEDURE — 84484 ASSAY OF TROPONIN QUANT: CPT

## 2023-08-15 RX ORDER — ESOMEPRAZOLE MAGNESIUM 40 MG/1
40 CAPSULE, DELAYED RELEASE ORAL
Qty: 30 CAPSULE | Refills: 0 | Status: SHIPPED | OUTPATIENT
Start: 2023-08-15 | End: 2023-08-31

## 2023-08-15 RX ORDER — PREDNISONE 20 MG/1
40 TABLET ORAL DAILY
Qty: 10 TABLET | Refills: 0 | Status: SHIPPED | OUTPATIENT
Start: 2023-08-15 | End: 2023-08-20

## 2023-08-15 RX ORDER — KETOROLAC TROMETHAMINE 30 MG/ML
15 INJECTION, SOLUTION INTRAMUSCULAR; INTRAVENOUS ONCE
Status: COMPLETED | OUTPATIENT
Start: 2023-08-15 | End: 2023-08-15

## 2023-08-15 RX ADMIN — KETOROLAC TROMETHAMINE 15 MG: 30 INJECTION, SOLUTION INTRAMUSCULAR; INTRAVENOUS at 16:07

## 2023-08-15 ASSESSMENT — FIBROSIS 4 INDEX: FIB4 SCORE: 0.38

## 2023-08-15 NOTE — ED PROVIDER NOTES
ED Provider Note    CHIEF COMPLAINT  Chief Complaint   Patient presents with    Back Pain    Chest Pain     X1 day - Hx of pericarditis       EXTERNAL RECORDS REVIEWED  Reviewed extensive records from 2018 when the patient was hospitalized and diagnosed with pleural effusions and pericarditis    HPI/ROS  LIMITATION TO HISTORY   None  OUTSIDE HISTORIAN(S):  None    Theodora Ponce is a 27 y.o. female who presents for a constellation of symptoms including chest discomfort reported shortness of breath not feeling well.  Symptoms began yesterday evening around 7 PM.  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  She was hospitalized in 2018 after appendectomy as he subsequently developed bilateral pleural effusions requiring thoracentesis as well as pericarditis for which she improved after steroids.  She had a complete resolution after several weeks of therapy.  She specifically denies any leg swelling or hemoptysis.  No high fevers or productive cough.  No strokelike symptoms such as numbness weakness tingling to the arms legs or face.  No fevers or chills.  Pain is described as pressure and burning, worse with leaning back and leaning forward upright position relieves discomfort.  No lower abdominal pain flank pain or hematuria    PAST MEDICAL HISTORY   has a past medical history of Pericarditis (05/2018), Seizure disorder (HCC), and Tonsillitis.    SURGICAL HISTORY   has a past surgical history that includes tonsillectomy (2/24/2010); appendectomy (04/2018); and cholecystectomy (05/2018).    FAMILY HISTORY  No family history on file.    SOCIAL HISTORY  Social History     Tobacco Use    Smoking status: Never    Smokeless tobacco: Never   Vaping Use    Vaping Use: Never used   Substance and Sexual Activity    Alcohol use: Yes     Comment: occ    Drug use: No    Sexual activity: Not on file       CURRENT MEDICATIONS  Home Medications       Reviewed by Faye Lucio R.N. (Registered Nurse) on  "08/15/23 at 1309  Med List Status: Not Addressed     Medication Last Dose Status   LamoTRIgine 100 MG TABLET SR 24 HR  Active   meloxicam (MOBIC) 15 MG tablet  Active   methocarbamol (ROBAXIN) 500 MG Tab  Active   valACYclovir (VALTREX) 500 MG Tab  Active   vitamin D2, Ergocalciferol, (DRISDOL) 1.25 MG (42395 UT) Cap capsule  Active                    ALLERGIES  Allergies   Allergen Reactions    Vicodin [Hydrocodone-Acetaminophen] Hives    Hydrocodone-Acetaminophen Hives       PHYSICAL EXAM  VITAL SIGNS: BP (P) 95/61   Pulse (P) 86   Temp 36.1 °C (96.9 °F) (Temporal)   Resp (P) 20   Ht 1.6 m (5' 3\")   Wt 81.6 kg (180 lb)   LMP 07/12/2023 (Approximate)   SpO2 (P) 100%   BMI 31.89 kg/m²    Pulse ox interpretation: I interpret this pulse ox as normal.  Constitutional: Alert and oriented x 3, mild distress  HEENT: Atraumatic normocephalic, pupils are equal round reactive to light extraocular movements are intact. The nares is clear, external ears are normal, mouth shows moist mucous membranes normal dentition for age  Neck: Supple, no JVD no tracheal deviation  Cardiovascular: Regular rate and rhythm no murmur rub or gallop 2+ pulses peripherally x4  Thorax & Lungs: No respiratory distress, no wheezes rales or rhonchi, No chest tenderness.   GI: Soft nontender nondistended positive bowel sounds, no peritoneal signs  Skin: Warm dry no acute rash or lesion  Musculoskeletal: Moving all extremities with full range and 5 of 5 strength no acute  deformity negative Homans' sign bilaterally  Neurologic: Cranial nerves III through XII are grossly intact no sensory deficit no cerebellar dysfunction   Psychiatric: Anxious        DIAGNOSTIC STUDIES / PROCEDURES    ERP performed quick look transthoracic ultrasound of the heart: 2 views both subxiphoid and transthoracic were utilized with a curvilinear probe.  There did not appear to be any pericardial effusion and ejection fraction appeared to be grossly normal.  No further " analysis attempted        LABS  Results for orders placed or performed during the hospital encounter of 08/15/23   CBC WITH DIFFERENTIAL   Result Value Ref Range    WBC 10.4 4.8 - 10.8 K/uL    RBC 4.95 4.20 - 5.40 M/uL    Hemoglobin 14.5 12.0 - 16.0 g/dL    Hematocrit 43.6 37.0 - 47.0 %    MCV 88.1 81.4 - 97.8 fL    MCH 29.3 27.0 - 33.0 pg    MCHC 33.3 32.2 - 35.5 g/dL    RDW 42.4 35.9 - 50.0 fL    Platelet Count 293 164 - 446 K/uL    MPV 10.9 9.0 - 12.9 fL    Neutrophils-Polys 68.70 44.00 - 72.00 %    Lymphocytes 22.90 22.00 - 41.00 %    Monocytes 7.20 0.00 - 13.40 %    Eosinophils 0.30 0.00 - 6.90 %    Basophils 0.50 0.00 - 1.80 %    Immature Granulocytes 0.40 0.00 - 0.90 %    Nucleated RBC 0.00 0.00 - 0.20 /100 WBC    Neutrophils (Absolute) 7.17 1.82 - 7.42 K/uL    Lymphs (Absolute) 2.39 1.00 - 4.80 K/uL    Monos (Absolute) 0.75 0.00 - 0.85 K/uL    Eos (Absolute) 0.03 0.00 - 0.51 K/uL    Baso (Absolute) 0.05 0.00 - 0.12 K/uL    Immature Granulocytes (abs) 0.04 0.00 - 0.11 K/uL    NRBC (Absolute) 0.00 K/uL   Comp Metabolic Panel   Result Value Ref Range    Sodium 138 135 - 145 mmol/L    Potassium 3.7 3.6 - 5.5 mmol/L    Chloride 103 96 - 112 mmol/L    Co2 27 20 - 33 mmol/L    Anion Gap 8.0 7.0 - 16.0    Glucose 91 65 - 99 mg/dL    Bun 8 8 - 22 mg/dL    Creatinine 0.75 0.50 - 1.40 mg/dL    Calcium 9.8 8.5 - 10.5 mg/dL    Correct Calcium 9.0 8.5 - 10.5 mg/dL    AST(SGOT) 17 12 - 45 U/L    ALT(SGPT) 15 2 - 50 U/L    Alkaline Phosphatase 75 30 - 99 U/L    Total Bilirubin 0.6 0.1 - 1.5 mg/dL    Albumin 5.0 (H) 3.2 - 4.9 g/dL    Total Protein 7.6 6.0 - 8.2 g/dL    Globulin 2.6 1.9 - 3.5 g/dL    A-G Ratio 1.9 g/dL   HCG QUAL SERUM   Result Value Ref Range    Beta-Hcg Qualitative Serum Negative Negative   Sed Rate   Result Value Ref Range    Sed Rate [facility] 4 0 - 25 mm/hour   CRP QUANTITIVE (NON-CARDIAC)   Result Value Ref Range    Stat C-Reactive Protein 1.76 (H) 0.00 - 0.75 mg/dL   D-DIMER   Result Value Ref Range     D-Dimer 0.42 0.00 - 0.50 ug/mL (FEU)   ESTIMATED GFR   Result Value Ref Range    GFR (CKD-EPI) 111 >60 mL/min/1.73 m 2   TROPONIN   Result Value Ref Range    Troponin T <6 6 - 19 ng/L   EKG   Result Value Ref Range    Report       [facility]    Test Date:  2023-08-15  Pt Name:    TIFFANY VYAS                  Department: ER  MRN:        [id number]                      Room:  Gender:     Female                       Technician: [id number]  :        1995                   Requested By:ER TRIAGE PROTOCOL  Order #:    [id number]                    Reading MD: DERIC GLASS MD    Measurements  Intervals                                Axis  Rate:       90                           P:          62  NV:         138                          QRS:        37  QRSD:       82                           T:          29  QT:         350  QTc:        429    Interpretive Statements  Sinus rhythm  Compared to ECG 2022 16:22:03  Sinus tachycardia no longer present  T-wave abnormality no longer present  Electronically Signed On 08- 14:19:43 PDT by DERIC GLASS MD          RADIOLOGY  I have independently interpreted the diagnostic imaging associated with this visit and am waiting the final reading from the radiologist.   My preliminary interpretation is as follows: No evidence of heart failure or enlarged cardiac silhouette  Radiologist interpretation:   DX-CHEST-PORTABLE (1 VIEW)   Final Result      No acute cardiopulmonary abnormality.          COURSE & MEDICAL DECISION MAKING    ED Observation Status? No; Patient does not meet criteria for ED Observation.     INITIAL ASSESSMENT, COURSE AND PLAN  Care Narrative:     This is a very pleasant 27-year-old female presents with over 12 hours of chest discomfort.  I reviewed her extensive records and she previously had some sort of autoimmune inflammatory process in her thorax that led to bilateral pleural effusions, pericarditis around 5  "years ago.  Here she had mild tachycardia but had no high fever hypotension or hypoxia.  Her lungs are clear.  CBC demonstrated no leukocytosis or bandemia.  Metabolic panel is entirely normal serum pregnancy is negative.  D-dimer testing is negative as well.  Troponin is nondetectable strongly suggesting against myopericarditis and with discomfort for over 12 hours single troponin should suffice as there is no clinical evidence of acute coronary syndrome.  I suspect and I told the patient she could have a very mild case of pericarditis but chest x-ray laboratory studies D-dimer inflammatory markers are reassuring and normal including sed rate.  My transthoracic \"bedside ultrasound did not demonstrate any pericardial effusion.  I will start her on a very short course of prednisone and refer her back to cardiology for follow-up and formal echocardiogram if symptoms persist.  Heart score is 0      ADDITIONAL PROBLEM LIST    DISPOSITION AND DISCUSSIONS  I have discussed management of the patient with the following physicians and TEE's: None    Discussion of management with other QHP or appropriate source(s): None    Escalation of care considered, and ultimately not performed: Considered formal echo    Barriers to care at this time, including but not limited to: None    Decision tools and prescription drugs considered including, but not limited to: Patient will be prescribed a short course of prednisone    FINAL DIAGNOSIS  Chest pain  Possible early pericarditis       Electronically signed by: Pete Juares M.D., 8/15/2023 3:10 PM      "

## 2023-08-15 NOTE — ED TRIAGE NOTES
"Chief Complaint   Patient presents with    Back Pain    Chest Pain     X1 day - Hx of pericarditis     Pt ambulatory to triage for complaints of chest and back pain and pressure. Pt states pain is worse upon movement, especially banding over. Pt states she had pericarditis in 2018 and this feels similar to that episode.     Pt is alert and oriented, speaking in full sentences, follows commands and responds appropriately to questions.      Pt placed in lobby. Pt educated on triage process and apologized for wait time. Pt encouraged to alert staff for any changes.     Patient and staff wearing appropriate PPE      /83   Pulse (!) 105   Temp 36.1 °C (96.9 °F) (Temporal)   Resp 18   Ht 1.6 m (5' 3\")   Wt 81.6 kg (180 lb)   SpO2 98%       "

## 2023-08-16 NOTE — ED NOTES
Discharge instructions given to patient, prescriptions provided, a verbal understanding of all instructions was stated. IV removed, cathlon intact, site without s/s of infection. Pt preferred to walk out accompanied by self VSS,  all belongings accounted for.

## 2023-08-18 ENCOUNTER — TELEPHONE (OUTPATIENT)
Dept: HEALTH INFORMATION MANAGEMENT | Facility: OTHER | Age: 28
End: 2023-08-18
Payer: COMMERCIAL

## 2023-08-18 ENCOUNTER — TELEPHONE (OUTPATIENT)
Dept: CARDIOLOGY | Facility: MEDICAL CENTER | Age: 28
End: 2023-08-18
Payer: COMMERCIAL

## 2023-08-18 NOTE — TELEPHONE ENCOUNTER
Spoke with patient who confirmed she has not been able to scheduled stress test yet as she wasn't sure she would need it, patient would like to go ahead and schedule, provided number for imaging scheduling, and reminded of upcoming appointment date and time.

## 2023-08-22 ENCOUNTER — APPOINTMENT (OUTPATIENT)
Dept: CARDIOLOGY | Facility: MEDICAL CENTER | Age: 28
End: 2023-08-22
Attending: EMERGENCY MEDICINE
Payer: COMMERCIAL

## 2023-08-29 NOTE — PROGRESS NOTES
Chief Complaint   Patient presents with    Chest Pain     Hospital follow up       Subjective     Theodora Ponce is a 27 y.o. female who presents today for follow-up after recent hospital emergency department visit for chest pain.  Was thought to have a mild case of pericarditis and started on a short steroid taper.    Patient has a medical history significant for seizure, pericardial effusion, recurrent pericarditis(4 times, thought to be secondary to infectious pericarditis), and palpitations.  Patient last seen in clinic by Dr. Ma November 18, 2022.    Today in clinic patient states that her pain has completely resolved, other than this episode she has been pain-free since last year.  When she was in the hospital she presented with 2 days of stomach flu/virus and then had acute onset of pain related to pericarditis.  Pain was the same as previous episodes, it did resolve after the second day of her steroid taper.  Her PCP is concerned that her symptoms and recurrent pericarditis may be related to some underlying autoimmune issue and is planning to start additional work-up in the near future.     Past Medical History:   Diagnosis Date    Pericarditis 05/2018    Seizure disorder (HCC)     Tonsillitis      Past Surgical History:   Procedure Laterality Date    CHOLECYSTECTOMY  05/2018    APPENDECTOMY  04/2018    TONSILLECTOMY  2/24/2010    Performed by YULIYA PEDERSEN at SURGERY SAME DAY [facility] ORS     No family history on file.  Social History     Socioeconomic History    Marital status:      Spouse name: Not on file    Number of children: Not on file    Years of education: Not on file    Highest education level: Not on file   Occupational History    Not on file   Tobacco Use    Smoking status: Never    Smokeless tobacco: Never   Vaping Use    Vaping Use: Never used   Substance and Sexual Activity    Alcohol use: Yes     Comment: occ    Drug use: No    Sexual activity: Not on file   Other Topics  Concern    Not on file   Social History Narrative    ** Merged History Encounter **          Social Determinants of Health     Financial Resource Strain: Not on file   Food Insecurity: Not on file   Transportation Needs: Not on file   Physical Activity: Not on file   Stress: Not on file   Social Connections: Not on file   Intimate Partner Violence: Not on file   Housing Stability: Not on file     Allergies   Allergen Reactions    Vicodin [Hydrocodone-Acetaminophen] Hives    Hydrocodone-Acetaminophen Hives     Outpatient Encounter Medications as of 8/31/2023   Medication Sig Dispense Refill    LamoTRIgine 100 MG TABLET SR 24 HR Take 100 mg by mouth at bedtime. 90 Tablet 3    valACYclovir (VALTREX) 500 MG Tab TAKE 4 TABLETS AT FIRST SIGN OF ATTACK AND THEN TAKE 4 TABLETS 12 HOURS LATER      methocarbamol (ROBAXIN) 500 MG Tab Take  by mouth 4 times a day as needed.      [DISCONTINUED] azithromycin (ZITHROMAX) 250 MG Tab       [DISCONTINUED] benzonatate (TESSALON) 100 MG Cap Take 1 capsule 3 times a day by oral route for 7 days. (Patient not taking: Reported on 8/31/2023)      [DISCONTINUED] LamoTRIgine 100 MG TABLET SR 24 HR Take 1 Tablet by mouth at bedtime.      [DISCONTINUED] methocarbamol (ROBAXIN) 500 MG Tab Take 1 Tablet by mouth every 24 hours as needed.      [DISCONTINUED] ondansetron (ZOFRAN ODT) 4 MG TABLET DISPERSIBLE Place 1 tablet 3 times a day by translingual route for 5 days. (Patient not taking: Reported on 8/31/2023)      [DISCONTINUED] predniSONE (DELTASONE) 20 MG Tab Take 1 Tablet by mouth every day.      [DISCONTINUED] Vitamin D, Ergocalciferol, 91373 units Cap Take 1 Capsule by mouth every 7 days.      [DISCONTINUED] esomeprazole (NEXIUM) 40 MG delayed-release capsule Take 1 Capsule by mouth every morning before breakfast. 30 Capsule 0    [DISCONTINUED] meloxicam (MOBIC) 15 MG tablet Take 1 Tablet by mouth every day. 30 Tablet 0    [DISCONTINUED] vitamin D2, Ergocalciferol, (DRISDOL) 1.25 MG (72358  "UT) Cap capsule TAKE 1 CAPSULE BY MOUTH ONE TIME PER WEEK 12 Capsule 1     No facility-administered encounter medications on file as of 8/31/2023.     Review of Systems   Respiratory:  Negative for shortness of breath.    Cardiovascular:  Negative for chest pain, palpitations, orthopnea and leg swelling.   Neurological:  Negative for dizziness and loss of consciousness.   All other systems reviewed and are negative.             Objective     /66 (BP Location: Left arm, Patient Position: Sitting)   Pulse 74   Resp 16   Ht 1.6 m (5' 3\")   Wt 85.7 kg (189 lb)   LMP 07/12/2023 (Approximate)   SpO2 98%   BMI 33.48 kg/m²     Physical Exam  Vitals reviewed.   Constitutional:       General: She is not in acute distress.     Appearance: She is normal weight.   Cardiovascular:      Rate and Rhythm: Normal rate and regular rhythm.      Heart sounds: No murmur heard.  Pulmonary:      Effort: Pulmonary effort is normal. No respiratory distress.      Breath sounds: Normal breath sounds. No rhonchi.   Abdominal:      General: There is no distension.      Tenderness: There is no abdominal tenderness.   Musculoskeletal:      Cervical back: Normal range of motion.      Right lower leg: No edema.      Left lower leg: No edema.   Neurological:      General: No focal deficit present.      Mental Status: She is alert.            Lab Results   Component Value Date/Time    CHOLSTRLTOT 102 07/22/2018 03:55 AM    LDL 59 07/22/2018 03:55 AM    HDL 28 (A) 07/22/2018 03:55 AM    TRIGLYCERIDE 76 07/22/2018 03:55 AM       Lab Results   Component Value Date/Time    SODIUM 138 08/15/2023 02:21 PM    POTASSIUM 3.7 08/15/2023 02:21 PM    CHLORIDE 103 08/15/2023 02:21 PM    CO2 27 08/15/2023 02:21 PM    GLUCOSE 91 08/15/2023 02:21 PM    BUN 8 08/15/2023 02:21 PM    CREATININE 0.75 08/15/2023 02:21 PM    CREATININE 1.2 03/17/2009 06:00 PM    BUNCREATRAT 15 03/29/2022 05:23 AM     Lab Results   Component Value Date/Time    ALKPHOSPHAT 75 " 08/15/2023 02:21 PM    ASTSGOT 17 08/15/2023 02:21 PM    ALTSGPT 15 08/15/2023 02:21 PM    TBILIRUBIN 0.6 08/15/2023 02:21 PM          Assessment & Plan     1. Acute idiopathic pericarditis        2. Seizures (HCC)            Medical Decision Making: Today's Assessment/Status/Plan:        Atypical chest pain  History of pericarditis  History of pericardial effusion  -Patient with history of recurrent pericarditis and pericardial effusion requiring prednisone.  Initial episode and subsequent episodes followed an abdominal infection which required having her appendix and gallbladder removed in 2018  -This most recent episode occurred following a stomach flu/virus  -Resolved with steroid taper  -Last year after recurrent pericarditis a treadmill EKG test was ordered, results were completely normal with normal exercise response and no evidence of EKG changes  -Patient followed by her PCP and they plan to do additional autoimmune work-up for other underlying issue     Palpitations, improved  Paroxysmal SVT  Event monitor showed 1 run of SVT, lasting 3 minutes and 2 seconds. No evidence of anemia.  Thyroid normal.  No evidence of electrolyte abnormalities.   -No recurrence of palpitations since last year after starting a 24-hour release seizure medication  -Patient did not tolerate propranolol    Patient to follow-up as needed    Gladis Brunson, MSN, APRN  [facility]  [phone]    Please note this dictation was created using voice recognition software.  I have made every reasonable attempt to correct obvious errors, but there may be errors of grammar and possibly content that I did not discover before finalizing the note.

## 2023-08-30 ENCOUNTER — HOSPITAL ENCOUNTER (OUTPATIENT)
Dept: RADIOLOGY | Facility: MEDICAL CENTER | Age: 28
End: 2023-08-30
Attending: STUDENT IN AN ORGANIZED HEALTH CARE EDUCATION/TRAINING PROGRAM
Payer: COMMERCIAL

## 2023-08-30 DIAGNOSIS — R07.89 OTHER CHEST PAIN: ICD-10-CM

## 2023-08-30 PROCEDURE — 93017 CV STRESS TEST TRACING ONLY: CPT

## 2023-08-30 PROCEDURE — 93018 CV STRESS TEST I&R ONLY: CPT | Performed by: STUDENT IN AN ORGANIZED HEALTH CARE EDUCATION/TRAINING PROGRAM

## 2023-08-31 ENCOUNTER — OFFICE VISIT (OUTPATIENT)
Dept: CARDIOLOGY | Facility: MEDICAL CENTER | Age: 28
End: 2023-08-31
Attending: EMERGENCY MEDICINE
Payer: COMMERCIAL

## 2023-08-31 VITALS
HEIGHT: 63 IN | DIASTOLIC BLOOD PRESSURE: 66 MMHG | BODY MASS INDEX: 33.49 KG/M2 | SYSTOLIC BLOOD PRESSURE: 102 MMHG | WEIGHT: 189 LBS | RESPIRATION RATE: 16 BRPM | HEART RATE: 74 BPM | OXYGEN SATURATION: 98 %

## 2023-08-31 DIAGNOSIS — I30.0 ACUTE IDIOPATHIC PERICARDITIS: ICD-10-CM

## 2023-08-31 DIAGNOSIS — R56.9 SEIZURES (HCC): ICD-10-CM

## 2023-08-31 PROCEDURE — 99213 OFFICE O/P EST LOW 20 MIN: CPT | Performed by: NURSE PRACTITIONER

## 2023-08-31 PROCEDURE — 99212 OFFICE O/P EST SF 10 MIN: CPT | Performed by: NURSE PRACTITIONER

## 2023-08-31 PROCEDURE — 3078F DIAST BP <80 MM HG: CPT | Performed by: NURSE PRACTITIONER

## 2023-08-31 PROCEDURE — 3074F SYST BP LT 130 MM HG: CPT | Performed by: NURSE PRACTITIONER

## 2023-08-31 PROCEDURE — 99211 OFF/OP EST MAY X REQ PHY/QHP: CPT | Performed by: NURSE PRACTITIONER

## 2023-08-31 RX ORDER — ERGOCALCIFEROL 1.25 MG/1
1 CAPSULE ORAL
COMMUNITY
End: 2023-08-31

## 2023-08-31 RX ORDER — BENZONATATE 100 MG/1
CAPSULE ORAL
COMMUNITY
End: 2023-08-31

## 2023-08-31 RX ORDER — AZITHROMYCIN 250 MG/1
TABLET, FILM COATED ORAL
COMMUNITY
End: 2023-08-31

## 2023-08-31 RX ORDER — METHOCARBAMOL 500 MG/1
1 TABLET, FILM COATED ORAL
COMMUNITY
End: 2023-08-31

## 2023-08-31 RX ORDER — ONDANSETRON 4 MG/1
TABLET, ORALLY DISINTEGRATING ORAL
COMMUNITY
End: 2023-08-31

## 2023-08-31 RX ORDER — LAMOTRIGINE 100 MG/1
1 TABLET, EXTENDED RELEASE ORAL
COMMUNITY
End: 2023-08-31

## 2023-08-31 RX ORDER — PREDNISONE 20 MG/1
1 TABLET ORAL
COMMUNITY
End: 2023-08-31

## 2023-08-31 ASSESSMENT — ENCOUNTER SYMPTOMS
SHORTNESS OF BREATH: 0
DIZZINESS: 0
ORTHOPNEA: 0
LOSS OF CONSCIOUSNESS: 0
PALPITATIONS: 0

## 2023-08-31 ASSESSMENT — FIBROSIS 4 INDEX: FIB4 SCORE: 0.42

## 2023-12-19 ENCOUNTER — HOSPITAL ENCOUNTER (OUTPATIENT)
Dept: LAB | Facility: MEDICAL CENTER | Age: 28
End: 2023-12-19
Attending: PSYCHIATRY & NEUROLOGY
Payer: COMMERCIAL

## 2023-12-19 DIAGNOSIS — R56.9 SEIZURES (HCC): ICD-10-CM

## 2023-12-19 LAB
ALBUMIN SERPL BCP-MCNC: 4.6 G/DL (ref 3.2–4.9)
ALBUMIN/GLOB SERPL: 1.9 G/DL
ALP SERPL-CCNC: 66 U/L (ref 30–99)
ALT SERPL-CCNC: 14 U/L (ref 2–50)
ANION GAP SERPL CALC-SCNC: 10 MMOL/L (ref 7–16)
AST SERPL-CCNC: 13 U/L (ref 12–45)
BILIRUB SERPL-MCNC: 0.4 MG/DL (ref 0.1–1.5)
BUN SERPL-MCNC: 7 MG/DL (ref 8–22)
CALCIUM ALBUM COR SERPL-MCNC: 8.5 MG/DL (ref 8.5–10.5)
CALCIUM SERPL-MCNC: 9 MG/DL (ref 8.5–10.5)
CHLORIDE SERPL-SCNC: 105 MMOL/L (ref 96–112)
CO2 SERPL-SCNC: 23 MMOL/L (ref 20–33)
CREAT SERPL-MCNC: 0.7 MG/DL (ref 0.5–1.4)
GFR SERPLBLD CREATININE-BSD FMLA CKD-EPI: 120 ML/MIN/1.73 M 2
GLOBULIN SER CALC-MCNC: 2.4 G/DL (ref 1.9–3.5)
GLUCOSE SERPL-MCNC: 89 MG/DL (ref 65–99)
POTASSIUM SERPL-SCNC: 3.8 MMOL/L (ref 3.6–5.5)
PROT SERPL-MCNC: 7 G/DL (ref 6–8.2)
SODIUM SERPL-SCNC: 138 MMOL/L (ref 135–145)

## 2023-12-19 PROCEDURE — 80175 DRUG SCREEN QUAN LAMOTRIGINE: CPT

## 2023-12-19 PROCEDURE — 80053 COMPREHEN METABOLIC PANEL: CPT

## 2023-12-19 PROCEDURE — 36415 COLL VENOUS BLD VENIPUNCTURE: CPT

## 2023-12-21 LAB — LAMOTRIGINE SERPL-MCNC: 1.9 UG/ML (ref 3–15)

## 2024-01-15 ENCOUNTER — HOSPITAL ENCOUNTER (OUTPATIENT)
Dept: RADIOLOGY | Facility: MEDICAL CENTER | Age: 29
End: 2024-01-15
Attending: PSYCHIATRY & NEUROLOGY
Payer: COMMERCIAL

## 2024-01-15 DIAGNOSIS — R56.9 SEIZURES (HCC): ICD-10-CM

## 2024-01-15 PROCEDURE — 70553 MRI BRAIN STEM W/O & W/DYE: CPT

## 2024-01-15 PROCEDURE — 700117 HCHG RX CONTRAST REV CODE 255: Performed by: PSYCHIATRY & NEUROLOGY

## 2024-01-15 PROCEDURE — A9579 GAD-BASE MR CONTRAST NOS,1ML: HCPCS | Performed by: PSYCHIATRY & NEUROLOGY

## 2024-01-15 RX ADMIN — GADOTERIDOL 18 ML: 279.3 INJECTION, SOLUTION INTRAVENOUS at 14:18

## 2024-01-17 ENCOUNTER — APPOINTMENT (OUTPATIENT)
Dept: URGENT CARE | Facility: PHYSICIAN GROUP | Age: 29
End: 2024-01-17
Payer: COMMERCIAL

## 2024-02-19 ENCOUNTER — OFFICE VISIT (OUTPATIENT)
Dept: URGENT CARE | Facility: PHYSICIAN GROUP | Age: 29
End: 2024-02-19
Payer: COMMERCIAL

## 2024-02-19 VITALS
BODY MASS INDEX: 31.89 KG/M2 | WEIGHT: 180 LBS | HEIGHT: 63 IN | TEMPERATURE: 97.8 F | OXYGEN SATURATION: 97 % | SYSTOLIC BLOOD PRESSURE: 108 MMHG | HEART RATE: 88 BPM | RESPIRATION RATE: 16 BRPM | DIASTOLIC BLOOD PRESSURE: 62 MMHG

## 2024-02-19 DIAGNOSIS — R68.89 FLU-LIKE SYMPTOMS: ICD-10-CM

## 2024-02-19 PROCEDURE — 99213 OFFICE O/P EST LOW 20 MIN: CPT | Performed by: NURSE PRACTITIONER

## 2024-02-19 PROCEDURE — 3074F SYST BP LT 130 MM HG: CPT | Performed by: NURSE PRACTITIONER

## 2024-02-19 PROCEDURE — 3078F DIAST BP <80 MM HG: CPT | Performed by: NURSE PRACTITIONER

## 2024-02-19 RX ORDER — ALBUTEROL SULFATE 90 UG/1
2 AEROSOL, METERED RESPIRATORY (INHALATION) EVERY 6 HOURS PRN
Qty: 8.5 G | Refills: 0 | Status: SHIPPED | OUTPATIENT
Start: 2024-02-19 | End: 2024-03-04

## 2024-02-19 RX ORDER — DEXTROMETHORPHAN HYDROBROMIDE AND PROMETHAZINE HYDROCHLORIDE 15; 6.25 MG/5ML; MG/5ML
5 SYRUP ORAL EVERY 6 HOURS PRN
Qty: 100 ML | Refills: 0 | Status: SHIPPED | OUTPATIENT
Start: 2024-02-19 | End: 2024-03-04

## 2024-02-19 ASSESSMENT — FIBROSIS 4 INDEX: FIB4 SCORE: 0.34

## 2024-02-19 NOTE — PROGRESS NOTES
"Subjective:   Theodora Ponce is a 28 y.o. female who presents for Cough (X5 days cough, fever ( 2 days), mucus bright yellow with some red, chest pain, )    Patient is a 28-year-old female who presents clinic today reporting 5 days ago she started having bodyaches, headaches, fever, and cough, over the past 2 days she started having phlegm that is yellow to green in color with some red streaks and chest pain.  The fevers and bodyaches have resolved.  She does get intermittent shortness of breath, wheezing, diarrhea.  Has been taking over-the-counter DayQuil, NyQuil, emergency, Mucinex, and acetaminophen which did help with symptoms.  Is tolerating fluids well at home and is staying well-hydrated.    Patient does have prior history of pericarditis and pleural effusion, has been worked up extensively without any known cause.  Denies any acute symptoms today.    Medications, Allergies, and current problem list reviewed today in Epic.     Objective:     /62   Pulse 88   Temp 36.6 °C (97.8 °F) (Temporal)   Resp 16   Ht 1.6 m (5' 3\")   Wt 81.6 kg (180 lb)   SpO2 97%     Physical Exam  Vitals reviewed.   Constitutional:       Appearance: Normal appearance. She is ill-appearing.   HENT:      Head: Normocephalic.      Right Ear: Tympanic membrane, ear canal and external ear normal.      Left Ear: Tympanic membrane, ear canal and external ear normal.      Nose: Mucosal edema, congestion and rhinorrhea present. Rhinorrhea is clear.      Right Sinus: Maxillary sinus tenderness present. No frontal sinus tenderness.      Left Sinus: Maxillary sinus tenderness present. No frontal sinus tenderness.      Mouth/Throat:      Mouth: Mucous membranes are moist.      Pharynx: No oropharyngeal exudate or posterior oropharyngeal erythema.   Eyes:      Extraocular Movements: Extraocular movements intact.      Conjunctiva/sclera: Conjunctivae normal.      Pupils: Pupils are equal, round, and reactive to light.   Cardiovascular: "      Rate and Rhythm: Normal rate and regular rhythm.   Pulmonary:      Effort: Pulmonary effort is normal. No respiratory distress.      Breath sounds: Normal breath sounds. No stridor. No wheezing, rhonchi or rales.      Comments: Dry cough  Musculoskeletal:         General: Normal range of motion.      Cervical back: Normal range of motion and neck supple.   Lymphadenopathy:      Cervical: No cervical adenopathy.   Skin:     General: Skin is warm and dry.   Neurological:      Mental Status: She is alert and oriented to person, place, and time.   Psychiatric:         Mood and Affect: Mood normal.         Behavior: Behavior normal.         Thought Content: Thought content normal.         Judgment: Judgment normal.         Assessment/Plan:     Diagnosis and associated orders:     1. Flu-like symptoms  albuterol 108 (90 Base) MCG/ACT Aero Soln inhalation aerosol    promethazine-dextromethorphan (PROMETHAZINE-DM) 6.25-15 MG/5ML syrup         Comments/MDM:     This is acute condition.  Patient is ill-appearing in clinic with flulike symptoms.  She is nontoxic-appearing.  Lung sounds are clear on physical exam.  Heart sounds are normal without action rub.  Low suspicion at this time for acute pleural effusion or pericarditis.  Vital signs within normal range.  Did discuss with patient viral screening however she declined at this time and will continue with supportive care measures at home.  Prescription of albuterol and Promethazine DM sent to pharmacy to help with symptom relief.  Side effects medication discussed.  Recommended Tylenol and Motrin as needed.  Also recommended picking up guaifenesin to help with sputum.  If symptoms or not improving or if they acutely worsen recommended that she follow-up back in clinic or the emergency department.  Patient was involved with shared decision-making throughout the exam today and verbalizes understanding regards to plan of care, discharge instructions, and follow-up          Differential diagnosis, natural history, supportive care, and indications for immediate follow-up discussed.    Advised the patient to follow-up with the primary care physician for recheck, reevaluation, and consideration of further management.    I personally reviewed prior external notes and test results pertinent to today's visit as well as additional imaging and testing completed in clinic today.     Please note that this dictation was created using voice recognition software. I have made a reasonable attempt to correct obvious errors, but I expect that there are errors of grammar and possibly content that I did not discover before finalizing the note.

## 2024-03-04 ENCOUNTER — OFFICE VISIT (OUTPATIENT)
Dept: NEUROLOGY | Facility: MEDICAL CENTER | Age: 29
End: 2024-03-04
Attending: PSYCHIATRY & NEUROLOGY
Payer: COMMERCIAL

## 2024-03-04 VITALS
OXYGEN SATURATION: 99 % | WEIGHT: 198.19 LBS | SYSTOLIC BLOOD PRESSURE: 118 MMHG | HEART RATE: 83 BPM | BODY MASS INDEX: 35.12 KG/M2 | HEIGHT: 63 IN | RESPIRATION RATE: 16 BRPM | DIASTOLIC BLOOD PRESSURE: 72 MMHG | TEMPERATURE: 98.6 F

## 2024-03-04 DIAGNOSIS — R56.9 SEIZURES (HCC): ICD-10-CM

## 2024-03-04 PROCEDURE — 99211 OFF/OP EST MAY X REQ PHY/QHP: CPT | Performed by: PSYCHIATRY & NEUROLOGY

## 2024-03-04 PROCEDURE — 99215 OFFICE O/P EST HI 40 MIN: CPT | Performed by: PSYCHIATRY & NEUROLOGY

## 2024-03-04 RX ORDER — LAMOTRIGINE 25 MG/1
50 TABLET, EXTENDED RELEASE ORAL DAILY
Qty: 60 TABLET | Refills: 5 | Status: SHIPPED | OUTPATIENT
Start: 2024-03-04

## 2024-03-04 RX ORDER — LAMOTRIGINE 100 MG/1
100 TABLET, EXTENDED RELEASE ORAL
Qty: 90 TABLET | Refills: 3 | Status: SHIPPED | OUTPATIENT
Start: 2024-03-04

## 2024-03-04 ASSESSMENT — PATIENT HEALTH QUESTIONNAIRE - PHQ9: CLINICAL INTERPRETATION OF PHQ2 SCORE: 0

## 2024-03-04 ASSESSMENT — FIBROSIS 4 INDEX: FIB4 SCORE: 0.34

## 2024-03-04 NOTE — PROGRESS NOTES
[facility] Epilepsy Program  Follow Up Visit      Patient's Name: Theodora Ponce  YOB: 1995  MRN: [id number]  Date of Service: 03/04/2024    Referring Provider: Onofre Torres M.D.  [address] [place] [address]  [place] [id number]    Chief Concern: Seizures and right frontal lobe T2 hyperintensities noted on MRI brain.    The patient presents for a follow up visit. She presents alone.     HPI (as obtained at the time of the initial visit and updated as necessary): The patient is a 28 y.o., right-handed female, who initially presented to my clinic for evaluation of seizures on 04/03/2023.    The patient was in her usual health until 2018, when she had appendicitis, followed by cholecystitis, followed by pleuritis, and then finally pericarditis; she was evaluated for an autoimmune disorder, but was never diagnosed with any particular autoimmune disorder. She was treated with steroids at that time and eventually got better. She eventually was evaluated at [facility] in [place], but that was at the time when her inflammation subsided.     During this period, she had her first bilateral tonic-clonic seizure, as noted under event type #1, and afterwards started having spells described under the event type #2.She was initially tried on Keppra, but due to mood issues switched to lamotrigine, and eventually to lamotrigine extended release. She had no adverse effects to lamotrigine - no skin rash, no insomnia, no dizziness/balance issues.     Due to some non-specific white matter changes noted on MRI brain, she was evaluated for MS, but she never had symptoms consistent with MS. She never had vision changes, Lhermitte's sign, bladder/bowel incontinence, difficulties walking, nor persistent excessive fatigue. She has been getting yearly MRI studies.     She recently completed PT for her lower back pain and sciatica and is doing fine at this time; no weakness, numbness, back pain, bladder, nor bowel  "incontinence.     She does have intermittent work finding difficulties since 2018.     Semiology:    Event type #1: \"Seizures\".  Year/Age of Onset: 2018 (age 23)  Initial features: She feels fatigued before going to bed. All her seizures occurred out of sleep.   Event features: Bilateral tonic-clonic seizures, with no clear lateralization. Tongue bite, but no bladder/bowel incontinence. She does not have a recollection for the time during the event and she is not responsive during the event.   Post-ictal features: Angry and has to urinate right after a seizure. Confused and exhausted.   Duration: On average 1 minute, but one was 3.5 minutes.   Frequency: The first event was in August 2018. The second was 2 hours later. The third event occurred in December 2019.   Precipitating factors: Being off antiseizure medication (the last event was precipitated by being off Keppra).     Event type #2: \"Spells of metallic taste\".  Year/Age of Onset: 2018 (age 23)  Initial features: Metallic tasted in her mouth (like blood).  Event features: She experiences some nausea, heart racing, sweating, feels lighted, feels like something is coming over her, feels numb, has to sit down and \"breathe through it\". No confusion, no psychic phenomena, and no loss of time.   Post-ictal features: Feels a bit exhausted for a couple hours.   Duration: Seconds to minutes for most of her symptoms, but metallic taste and nausea up to couple hours.   Frequency: She used to have twice a month; she had none since she was switched to lamotrigine extended release around 2020.   Precipitating factors: Not clear.     History of status epilepticus: no  History of physical injury related to seizures: no  History of surgery related to epilepsy: no  Family Planning: No plans for pregnancy.  Current Driving Status: She does not have a 's license and does not drive.   Seizure Clusters: No  Longest Seizure Freedom: No seizures since December 2019. " "    Pertinent Ancillary Test Results:    MRI brain studies:   - MRI brain wo/w contrast (02/17/2021 at [facility]): \"IMPRESSION: 1. A couple nonspecific tiny T2 hyperintense foci in the right frontal periventricular white matter which are indeterminate and could represent nonspecific gliosis although cannot exclude tiny chronic demyelinating lesions. No abnormal enhancement.   2. No evidence of mass lesion, heterotopic gray matter, gross cortical dysplasia or mesial temporal sclerosis.     - MRI brain wo/w contrast (02/23/2022 at [facility]): \"No acute process. Stable appearance of tiny T2 hyperintense lesions in the right frontal region as described above, which may represent chronic demyelinating lesions. No new lesions are seen. No abnormal enhancement is identified.\"     - MRI brain w/wo contrast (01/15/2024 at [facility]): \"IMPRESSION: 1.  Stable few nonspecific supratentorial T2 hyperintensities. There are no new white matter T2 hyperintensities. 2.  History of seizures: There is no evidence of cortical dysplasia, or neuronal migrational abnormality or hippocampal sclerosis.\" I reviewed key images with the patient on 03/04/2024.     - MRI c-spine w/wo contrast (04/06/2021 @ [facility]): \"IMPRESSION:   1.  No abnormal cervical cord signal or enhancement.  2.  Minimal spondylosis. No spinal or foraminal stenosis.     MRI t-spine w/wo contrast (04/06/2021 [facility]): \"IMPRESSION:     1.  No abnormal thoracic cord signal or enhancement.  2.  T6-T7 small posterior disc extrusion.  3.  No significant thoracic spinal or foraminal stenosis.    EEG studies:   - Standard EEG (02/19/2021 Dr. Haque): This is a normal routine EEG recording in the awake, drowsy/sleep state(s).  Clinical correlation is recommended. Note: A normal EEG does not rule out epilepsy.  If the clinical suspicion remains high for seizures, a prolonged recording to capture clinical or subclinical events may be " helpful.    INTERIM HISTORY (03/04/2024): The patient had no stereotypical episodes consistent with her previously described seizures.  She is taking lamotrigine  mg daily and tolerates it well.    The patient did notice getting occasional sensation of pins-and-needles mostly over her left arm, lasting 30 to 60 seconds, and occurring once to twice per week, with no loss no changes in awareness.  She also had one episode of waking up during the night with sensation of shortness of breath, which occurred toward the end of a bout of influenza B.  She reports being quite sick with influenza B about 3 weeks ago.    She reports that she is interested in having children in the future, but not immediately.    She reports gaining weight without changing any of her habits.    Current Antiseizure Medications: Lamotrigine  mg daily.     Previously Tried Antiseizure Medications: Lamotrigine 100 mg daily. Keppra (had severe mood issues, suicidal ideation, and somnolence).     Review of Systems: She had fever 3 weeks ago in context of being very sick with Influenza B. She gained 17 lbs in the interim. No SI/HI.    Risk Factors For Epilepsy/Seizure Disorder: The patient is a product of premature pregnancy,  and the delivery was complicated with hypoxia due to cord wrapped around her neck; she was a blue baby. The early development was normal and the patient started waking, talking, and engaging in social interaction as expected. There were no challenges during school and no reported attendance of special education programs. There is no history of head trauma. There is no history of stroke. There is no history of CNS infections, such as encephalitis and/or meningitis. There is no personal history of febrile seizures. There is no history of neurosurgical interventions. There is no reported history of staring spells during childhood/school years.    Past Medical History:  Past Medical History:   Diagnosis Date     "Pericarditis 05/2018    Seizure disorder (HCC)     Tonsillitis      Past Surgical History:  Past Surgical History:   Procedure Laterality Date    CHOLECYSTECTOMY  05/2018    APPENDECTOMY  04/2018    TONSILLECTOMY  2/24/2010    Performed by YULIYA PEDERSEN at SURGERY SAME DAY [facility]      Social History:  Social History     Tobacco Use    Smoking status: Never    Smokeless tobacco: Never   Vaping Use    Vaping Use: Never used   Substance Use Topics    Alcohol use: Yes     Alcohol/week: 1.2 - 1.8 oz     Types: 2 - 3 Standard drinks or equivalent per week     Comment: occ    Drug use: No     Family History:  There is known family history of febrile seizures - her sister and her sister's son had febrile seizures.        3/4/2024    11:30 AM 4/3/2023     8:40 AM 2/3/2021     7:20 AM   PHQ-9 Screening   Little interest or pleasure in doing things 0 - not at all 0 - not at all 0 - not at all   Feeling down, depressed, or hopeless 0 - not at all 0 - not at all 0 - not at all   PHQ-2 Total Score 0 0 0     [place] Suicide Reassessment  New or continued thoughts about killing self?: No  Preparing to end life?: No    Allergies:  Allergies   Allergen Reactions    Vicodin [Hydrocodone-Acetaminophen] Hives    Hydrocodone-Acetaminophen Hives     Current Medications:    Current Outpatient Medications:     LamoTRIgine, 100 mg, Oral, QHS, Taking    valACYclovir, TAKE 4 TABLETS AT FIRST SIGN OF ATTACK AND THEN TAKE 4 TABLETS 12 HOURS LATER, PRN    methocarbamol, Take  by mouth 4 times a day as needed., PRN    albuterol, 2 Puff, Inhalation, Q6HRS PRN    promethazine-dextromethorphan, 5 mL, Oral, Q6HRS PRN    Physical Examination:    Ambulatory Vitals  Encounter Vitals  Temperature: 37 °C (98.6 °F)  Temp src: Temporal  Blood Pressure: 118/72  Pulse: 83  Respiration: 16  Pulse Oximetry: 99 %  Weight: 89.9 kg (198 lb 3.1 oz)  Height: 160 cm (5' 3\")  BMI (Calculated): 35.11    Neurological Examination:  Mental Status: The patient " is alert and oriented to person, place, time, and situation. Speech is fluent, with no aphasia nor dysarthria noted. Affect is normal.    Cranial Nerve Examination:  CN I: Olfaction examination is deferred.  CN II: Visual fields are full to confrontation examination and show no visual field defect.   CN III, IV, VI: Eye movements are normal in all directions. Pupils are reactive to direct and consensual light. There is no relative afferent pupillary defect. There is no nystagmus.  CN V: Facial sensation to light touch is intact throughout.   CN VII: No significant facial muscle or other soft tissue asymmetry.  CN VIII: Hearing intact to rubbing sounds bilaterally.   CN IX, X: Soft palate elevates symmetrically.  CN XI: Symmetrical shoulder shrug exam.  CN XII: Midline tongue protrusion and moves symmetrically to each side.     Motor Examination:  Muscle strength is intact (5/5) throughout. Muscle tone is normal throughout. No abnormal movements are observed. No pronator drift is noted.     Muscle Stretch Reflexes Examination:  Muscle stretch reflexes are normal (2+) throughout and symmetric.    Sensory Examination:  Preserved sensation to light touch in all extremities.     Coordination:  Normal finger to nose testing bilaterally, no postural nor intentional tremor was noted.     Stance/gait:  Normal regular gait with normal arm swings and stride length. Able to perform tandem gait. Romberg sign is absent.     Labs:   - lamotrigine (12/19/2023): 1.9    ASSESSMENT AND PLAN:    1. 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 The etiology is most likely related to the reported hypoxia at birth in context of umbilical cord being around her neck; she also has family history of febrile seizures. The systemic inflammatory event in 2018 was likely a precipitating factor.  She is doing well on lamotrigine at this time and has no adverse  effects from it.    In context of the above-noted episodes of episodic and stereotypical tingling, as well as low level of lamotrigine, we will increase lamotrigine to 150 mg daily gradually.  Adverse effects were discussed in detail.  - LamoTRIgine 100 MG TABLET SR 24 HR; Take 100 mg by mouth at bedtime.  Dispense: 90 Tablet; Refill: 3  - LamoTRIgine 25 MG TABLET SR 24 HR; Take 50 mg by mouth every day.  Dispense: 60 Tablet; Refill: 5    We will obtain basic blood work:  - LAMOTRIGINE; Future  - CBC WITH DIFFERENTIAL; Future  - Comp Metabolic Panel; Future    Reviewed MRI brain as noted below.  Reviewed the most recent labs.    Discussed future pregnancy, and a need to obtain lamotrigine level prior to conceiving, and then getting lamotrigine level regularly after that.    Epilepsy counseling provided in writing and verbally.    2. Concern for demyelinating disorder.  In context of her non-specific, right frontal, white matter lesions, a concern was raised that she has an MS or a similar demyelinating disorder. Her MRI brain imaging remained stable and her MRI c-spine and t-spine showed no demyelinating lesions.  - we reviewed symptoms of MS and she has none; her exam was normal as well.  - we reviewed her most recent MRI brain, which is stable.  - we will plan for repeat MRI brain for January 2025.    3. Lower back pain and sciatica (history of).  Her symptoms got better after chiropractic treatment.  - no symptoms nor signs of myelopathy at this time  - we had a discussion that if she experience any new neurological symptoms, such as new pain in her neck/back, weakness/numbness, difficulties walking, and/or bladder/bowel incontinence, among others, to seek emergent medical help; the patient verbalized understanding and agreement.   - she should also continue to follow up with spine specialist as needed.    The patient was advised to follow-up with her primary care physician for the above described episode of  shortness of breath, as well as weight gain, to check her thyroid.    Patient Instructions   Please take lamotrigine SR24 as following:    Week 1: Lamotrigine SR24 125 mg every day.  Week 2: Lamotrigine SR24 125 mg every day.  Week 3: Lamotrigine SR24 150 mg every day.    Please stop lamotrigine immediately and  seek emergent medical attention if you experience any spreading skin rash, lesions in your mouth/private areas, and/or fever.     Please continue vitamin D supplementation.    Please let our office know if you have any changes in your seizure frequency and/characteristics. Otherwise, please keep the diary of your events and bring it with you at the time of your next follow up visit with our office.     Please take folic acid 1 mg daily. This is an over-the-counter supplement that is recommended to prevent certain developmental problems in your baby, in case you become pregnant in the future.    Please let our office know as soon as you become pregnant or plan to become pregnant.    If you are caring for a baby/young child, please make sure to be sitting on a soft surface while holding your baby/young child, so in case you have a seizure, your baby/young child is not injured due to fall.     Please let us know if you observe that your baby is excessively sleepy/has other changes and the pediatrician feels that there are no other explanations except possible adverse effects of antiseizure medication(s) your are taking while nursing your baby.     Please note that the following might precipitate seizures: missed doses of antiseizure medications, being sick with fever, stress, fatigue, sleep deprivation, not eating regularly, not drinking enough water, drinking too much alcohol, stopping alcohol suddenly if you are currently using it on a regular/daily basis, and/or using recreational drugs, among others.    Please note that the following might lead to an injury, potentially a life-threatening injury, in case  you have a seizure and/or lose awareness while:   - being in a large body of water by yourself, such as bath, pool, lake, ocean, among others (risk of drowning)   - being on unprotected heights (risk of fall)   - being around and/or operating heavy machinery (risk of injury)   - being around open fire/hot surfaces (risk of burns)   - any other activities/circumstances, in which if you lose awareness, you might injure yourself and/or others.    Please call for help (crisis line and/or 911) in case you have thoughts of harming yourself and/or others.    Please abstain from driving until further notice.    ------------------------------------------------------------------------------------------  Instructions for your family/caregivers:  Please call 911 if the patient has a seizure longer than 2-3 minutes, if seizures are back to back without him recovering to his baseline, or he does not start recovering within 5-10 minutes after the seizure stops. During the seizure - please turn him on his side, please make sure his head is protected (for example, you should put a pillow under his head, if one is available), and please do not put anything in his mouth.   -------------------------------------------------------------------------------------------    It is important that your seizures are well controlled and you have none or have them rarely. In addition to avoiding injury related to breakthrough seizures, frequent seizures increase risk of SUDEP (sudden unexpected death in epilepsy), where a person goes into a seizure and then never wakes up - this is a rare complication of seizure disorder; one of the best available ways to prevent it is to control your seizures well.     Due to the high volume of patients we are trying to help, your physician will not be able to respond by phone or in MyChart to your routine concerns between appointments.  This does not reflect a lack of interest or concern for you or your  diagnosis.  Please bring these questions and concerns to your appointment where your physician can answer.  Please relay more pressing concerns to our office, either via MyChart, or by phone; if not able to reach us please visit nearby Urgent Care Center or Emergency Department.  If any emergent medical needs, please seek emergent medical help and/or call 911.    Please note that we are not able to fill out paperwork that might be related to your work, utility company, disability, and/or driving, among others, in between the visits.  Please schedule a dedicated appointment to address your paperwork, so we can do that in a timely manner.  This is not due to lack of concern or interest for your disease-related work/administrative problems, but to make sure that we provide the best possible care and to fill out your paperwork in a correct and timely manner.    Thank you for entrusting your neurological care to [facility] and we look forward to continuing to serve you.       Due to the high volume of patients we are trying to help, your physician will not be able to respond by phone or in MyChart to your routine concerns between appointments.  This does not reflect a lack of interest or concern for you or your diagnosis.  Please bring these questions and concerns to your appointment where your physician can answer.  Please relay more pressing concerns to our office, either via MyChart, or by phone; if not able to reach us please visit nearby Urgent Care Center or Emergency Department.  If any emergent medical needs, please seek emergent medical help and/or call 911.    Please note that we are not able to fill out paperwork that might be related to your work, utility company, disability, and/or driving, among others, in between the visits.  Please schedule a dedicated appointment to address your paperwork, so we can do that in a timely manner.  This is not due to lack of concern or interest for your disease-related  work/administrative problems, but to make sure that we provide the best possible care and to fill out your paperwork in a correct and timely manner.    Thank you for entrusting your neurological care to [facility] and we look forward to continuing to serve you.    Follow up in 3 months.     My total time spent caring for the patient on the day of the encounter was 41 minutes.   This does not include time spent on separately billable procedures/tests.    Marvin De Jesus MD  Neurology Attending, Epilepsy Program  [facility]

## 2024-03-04 NOTE — PATIENT INSTRUCTIONS
Please take lamotrigine SR24 as following:    Week 1: Lamotrigine SR24 125 mg every day.  Week 2: Lamotrigine SR24 125 mg every day.  Week 3: Lamotrigine SR24 150 mg every day.    Please stop lamotrigine immediately and  seek emergent medical attention if you experience any spreading skin rash, lesions in your mouth/private areas, and/or fever.     Please continue vitamin D supplementation.    Please let our office know if you have any changes in your seizure frequency and/characteristics. Otherwise, please keep the diary of your events and bring it with you at the time of your next follow up visit with our office.     Please take folic acid 1 mg daily. This is an over-the-counter supplement that is recommended to prevent certain developmental problems in your baby, in case you become pregnant in the future.    Please let our office know as soon as you become pregnant or plan to become pregnant.    If you are caring for a baby/young child, please make sure to be sitting on a soft surface while holding your baby/young child, so in case you have a seizure, your baby/young child is not injured due to fall.     Please let us know if you observe that your baby is excessively sleepy/has other changes and the pediatrician feels that there are no other explanations except possible adverse effects of antiseizure medication(s) your are taking while nursing your baby.     Please note that the following might precipitate seizures: missed doses of antiseizure medications, being sick with fever, stress, fatigue, sleep deprivation, not eating regularly, not drinking enough water, drinking too much alcohol, stopping alcohol suddenly if you are currently using it on a regular/daily basis, and/or using recreational drugs, among others.    Please note that the following might lead to an injury, potentially a life-threatening injury, in case you have a seizure and/or lose awareness while:   - being in a large body of water by yourself,  such as bath, pool, lake, ocean, among others (risk of drowning)   - being on unprotected heights (risk of fall)   - being around and/or operating heavy machinery (risk of injury)   - being around open fire/hot surfaces (risk of burns)   - any other activities/circumstances, in which if you lose awareness, you might injure yourself and/or others.    Please call for help (crisis line and/or 911) in case you have thoughts of harming yourself and/or others.    Please abstain from driving until further notice.    ------------------------------------------------------------------------------------------  Instructions for your family/caregivers:  Please call 911 if the patient has a seizure longer than 2-3 minutes, if seizures are back to back without him recovering to his baseline, or he does not start recovering within 5-10 minutes after the seizure stops. During the seizure - please turn him on his side, please make sure his head is protected (for example, you should put a pillow under his head, if one is available), and please do not put anything in his mouth.   -------------------------------------------------------------------------------------------    It is important that your seizures are well controlled and you have none or have them rarely. In addition to avoiding injury related to breakthrough seizures, frequent seizures increase risk of SUDEP (sudden unexpected death in epilepsy), where a person goes into a seizure and then never wakes up - this is a rare complication of seizure disorder; one of the best available ways to prevent it is to control your seizures well.     Due to the high volume of patients we are trying to help, your physician will not be able to respond by phone or in [employer] to your routine concerns between appointments.  This does not reflect a lack of interest or concern for you or your diagnosis.  Please bring these questions and concerns to your appointment where your physician can  answer.  Please relay more pressing concerns to our office, either via MyChart, or by phone; if not able to reach us please visit nearby Urgent Care Center or Emergency Department.  If any emergent medical needs, please seek emergent medical help and/or call 911.    Please note that we are not able to fill out paperwork that might be related to your work, utility company, disability, and/or driving, among others, in between the visits.  Please schedule a dedicated appointment to address your paperwork, so we can do that in a timely manner.  This is not due to lack of concern or interest for your disease-related work/administrative problems, but to make sure that we provide the best possible care and to fill out your paperwork in a correct and timely manner.    Thank you for entrusting your neurological care to [facility] and we look forward to continuing to serve you.       Due to the high volume of patients we are trying to help, your physician will not be able to respond by phone or in MyChart to your routine concerns between appointments.  This does not reflect a lack of interest or concern for you or your diagnosis.  Please bring these questions and concerns to your appointment where your physician can answer.  Please relay more pressing concerns to our office, either via MyChart, or by phone; if not able to reach us please visit nearby Urgent Care Center or Emergency Department.  If any emergent medical needs, please seek emergent medical help and/or call 911.    Please note that we are not able to fill out paperwork that might be related to your work, utility company, disability, and/or driving, among others, in between the visits.  Please schedule a dedicated appointment to address your paperwork, so we can do that in a timely manner.  This is not due to lack of concern or interest for your disease-related work/administrative problems, but to make sure that we provide the best possible care and to fill  out your paperwork in a correct and timely manner.    Thank you for entrusting your neurological care to [facility] and we look forward to continuing to serve you.

## 2024-05-22 PROCEDURE — RXMED WILLOW AMBULATORY MEDICATION CHARGE: Performed by: PSYCHIATRY & NEUROLOGY

## 2024-05-23 ENCOUNTER — PHARMACY VISIT (OUTPATIENT)
Dept: PHARMACY | Facility: MEDICAL CENTER | Age: 29
End: 2024-05-23
Payer: MEDICARE

## 2024-05-23 PROCEDURE — RXOTC WILLOW AMBULATORY OTC CHARGE

## 2024-06-24 ENCOUNTER — TELEPHONE (OUTPATIENT)
Dept: NEUROLOGY | Facility: MEDICAL CENTER | Age: 29
End: 2024-06-24
Payer: COMMERCIAL

## 2024-06-24 NOTE — TELEPHONE ENCOUNTER
NEUROLOGY PATIENT PRE-VISIT PLANNING     Patient was NOT contacted to complete PVP.  Note: Patient will not be contacted if there is no indication to call.     Patient Appointment is scheduled as: Established Patient     Is visit type and length scheduled correctly? Yes    [employer] Patient is checked in Patient Demographics? Yes    3.   Is referral attached to visit? Yes    4. Were records received from referring provider? Yes    4. Patient was NOT contacted to have someone accompany them to visit.     5. Is this appointment scheduled as a Hospital Follow-Up?  No    6. Does the patient require any pre procedure or post procedure follow up? No    7. If any orders were placed at last visit or intended to be done for this visit do we have Results/Consult Notes? No  Labs - Labs ordered, NOT completed. Patient advised to complete prior to next appointment.  Imaging - Imaging ordered, completed and results are in chart.  Referrals - No referrals were ordered at last office visit.  Note: If patient appointment is for lab or imaging review and patient did not complete the studies, check with provider if OK to reschedule patient until completed.    8. If patient appointment is for Botox - is order pended for provider? N/A    9. Was Plan Assessment from last Neurology Office Visit Reviewed?  Yes

## 2024-06-28 ENCOUNTER — HOSPITAL ENCOUNTER (OUTPATIENT)
Dept: LAB | Facility: MEDICAL CENTER | Age: 29
End: 2024-06-28
Attending: NURSE PRACTITIONER
Payer: COMMERCIAL

## 2024-06-28 DIAGNOSIS — R56.9 SEIZURES (HCC): ICD-10-CM

## 2024-06-28 LAB
ALBUMIN SERPL BCP-MCNC: 4.5 G/DL (ref 3.2–4.9)
ALBUMIN SERPL BCP-MCNC: 4.6 G/DL (ref 3.2–4.9)
ALBUMIN/GLOB SERPL: 1.8 G/DL
ALBUMIN/GLOB SERPL: 1.8 G/DL
ALP SERPL-CCNC: 69 U/L (ref 30–99)
ALP SERPL-CCNC: 69 U/L (ref 30–99)
ALT SERPL-CCNC: 16 U/L (ref 2–50)
ALT SERPL-CCNC: 18 U/L (ref 2–50)
ANION GAP SERPL CALC-SCNC: 12 MMOL/L (ref 7–16)
ANION GAP SERPL CALC-SCNC: 12 MMOL/L (ref 7–16)
AST SERPL-CCNC: 17 U/L (ref 12–45)
AST SERPL-CCNC: 21 U/L (ref 12–45)
BASOPHILS # BLD AUTO: 0.7 % (ref 0–1.8)
BASOPHILS # BLD AUTO: 0.8 % (ref 0–1.8)
BASOPHILS # BLD: 0.05 K/UL (ref 0–0.12)
BASOPHILS # BLD: 0.06 K/UL (ref 0–0.12)
BILIRUB SERPL-MCNC: 0.6 MG/DL (ref 0.1–1.5)
BILIRUB SERPL-MCNC: 0.6 MG/DL (ref 0.1–1.5)
BUN SERPL-MCNC: 8 MG/DL (ref 8–22)
BUN SERPL-MCNC: 9 MG/DL (ref 8–22)
CALCIUM ALBUM COR SERPL-MCNC: 8.7 MG/DL (ref 8.5–10.5)
CALCIUM ALBUM COR SERPL-MCNC: 8.8 MG/DL (ref 8.5–10.5)
CALCIUM SERPL-MCNC: 9.2 MG/DL (ref 8.5–10.5)
CALCIUM SERPL-MCNC: 9.2 MG/DL (ref 8.5–10.5)
CHLORIDE SERPL-SCNC: 104 MMOL/L (ref 96–112)
CHLORIDE SERPL-SCNC: 104 MMOL/L (ref 96–112)
CHOLEST SERPL-MCNC: 157 MG/DL (ref 100–199)
CO2 SERPL-SCNC: 21 MMOL/L (ref 20–33)
CO2 SERPL-SCNC: 22 MMOL/L (ref 20–33)
CREAT SERPL-MCNC: 0.57 MG/DL (ref 0.5–1.4)
CREAT SERPL-MCNC: 0.67 MG/DL (ref 0.5–1.4)
EOSINOPHIL # BLD AUTO: 0.06 K/UL (ref 0–0.51)
EOSINOPHIL # BLD AUTO: 0.06 K/UL (ref 0–0.51)
EOSINOPHIL NFR BLD: 0.8 % (ref 0–6.9)
EOSINOPHIL NFR BLD: 0.8 % (ref 0–6.9)
ERYTHROCYTE [DISTWIDTH] IN BLOOD BY AUTOMATED COUNT: 42.1 FL (ref 35.9–50)
ERYTHROCYTE [DISTWIDTH] IN BLOOD BY AUTOMATED COUNT: 42.8 FL (ref 35.9–50)
EST. AVERAGE GLUCOSE BLD GHB EST-MCNC: 103 MG/DL
FASTING STATUS PATIENT QL REPORTED: NORMAL
FASTING STATUS PATIENT QL REPORTED: NORMAL
FOLATE SERPL-MCNC: 13.2 NG/ML
GFR SERPLBLD CREATININE-BSD FMLA CKD-EPI: 121 ML/MIN/1.73 M 2
GFR SERPLBLD CREATININE-BSD FMLA CKD-EPI: 126 ML/MIN/1.73 M 2
GLOBULIN SER CALC-MCNC: 2.5 G/DL (ref 1.9–3.5)
GLOBULIN SER CALC-MCNC: 2.5 G/DL (ref 1.9–3.5)
GLUCOSE SERPL-MCNC: 89 MG/DL (ref 65–99)
GLUCOSE SERPL-MCNC: 89 MG/DL (ref 65–99)
HBA1C MFR BLD: 5.2 % (ref 4–5.6)
HCT VFR BLD AUTO: 42.6 % (ref 37–47)
HCT VFR BLD AUTO: 43 % (ref 37–47)
HDLC SERPL-MCNC: 34 MG/DL
HGB BLD-MCNC: 14.2 G/DL (ref 12–16)
HGB BLD-MCNC: 14.3 G/DL (ref 12–16)
IMM GRANULOCYTES # BLD AUTO: 0.05 K/UL (ref 0–0.11)
IMM GRANULOCYTES # BLD AUTO: 0.05 K/UL (ref 0–0.11)
IMM GRANULOCYTES NFR BLD AUTO: 0.7 % (ref 0–0.9)
IMM GRANULOCYTES NFR BLD AUTO: 0.7 % (ref 0–0.9)
LDLC SERPL CALC-MCNC: 108 MG/DL
LYMPHOCYTES # BLD AUTO: 2.44 K/UL (ref 1–4.8)
LYMPHOCYTES # BLD AUTO: 2.48 K/UL (ref 1–4.8)
LYMPHOCYTES NFR BLD: 31.8 % (ref 22–41)
LYMPHOCYTES NFR BLD: 32.3 % (ref 22–41)
MCH RBC QN AUTO: 28.7 PG (ref 27–33)
MCH RBC QN AUTO: 29.1 PG (ref 27–33)
MCHC RBC AUTO-ENTMCNC: 33 G/DL (ref 32.2–35.5)
MCHC RBC AUTO-ENTMCNC: 33.6 G/DL (ref 32.2–35.5)
MCV RBC AUTO: 86.8 FL (ref 81.4–97.8)
MCV RBC AUTO: 87 FL (ref 81.4–97.8)
MONOCYTES # BLD AUTO: 0.59 K/UL (ref 0–0.85)
MONOCYTES # BLD AUTO: 0.62 K/UL (ref 0–0.85)
MONOCYTES NFR BLD AUTO: 7.7 % (ref 0–13.4)
MONOCYTES NFR BLD AUTO: 8.1 % (ref 0–13.4)
NEUTROPHILS # BLD AUTO: 4.43 K/UL (ref 1.82–7.42)
NEUTROPHILS # BLD AUTO: 4.46 K/UL (ref 1.82–7.42)
NEUTROPHILS NFR BLD: 57.7 % (ref 44–72)
NEUTROPHILS NFR BLD: 57.9 % (ref 44–72)
NRBC # BLD AUTO: 0 K/UL
NRBC # BLD AUTO: 0 K/UL
NRBC BLD-RTO: 0 /100 WBC (ref 0–0.2)
NRBC BLD-RTO: 0 /100 WBC (ref 0–0.2)
PLATELET # BLD AUTO: 274 K/UL (ref 164–446)
PLATELET # BLD AUTO: 296 K/UL (ref 164–446)
PMV BLD AUTO: 11.7 FL (ref 9–12.9)
PMV BLD AUTO: 11.7 FL (ref 9–12.9)
POTASSIUM SERPL-SCNC: 3.8 MMOL/L (ref 3.6–5.5)
POTASSIUM SERPL-SCNC: 3.9 MMOL/L (ref 3.6–5.5)
PROT SERPL-MCNC: 7 G/DL (ref 6–8.2)
PROT SERPL-MCNC: 7.1 G/DL (ref 6–8.2)
RBC # BLD AUTO: 4.91 M/UL (ref 4.2–5.4)
RBC # BLD AUTO: 4.94 M/UL (ref 4.2–5.4)
SODIUM SERPL-SCNC: 137 MMOL/L (ref 135–145)
SODIUM SERPL-SCNC: 138 MMOL/L (ref 135–145)
T4 FREE SERPL-MCNC: 1.25 NG/DL (ref 0.93–1.7)
TRIGL SERPL-MCNC: 76 MG/DL (ref 0–149)
TSH SERPL DL<=0.005 MIU/L-ACNC: 1.32 UIU/ML (ref 0.38–5.33)
VIT B12 SERPL-MCNC: 456 PG/ML (ref 211–911)
WBC # BLD AUTO: 7.7 K/UL (ref 4.8–10.8)
WBC # BLD AUTO: 7.7 K/UL (ref 4.8–10.8)

## 2024-06-28 PROCEDURE — 83036 HEMOGLOBIN GLYCOSYLATED A1C: CPT

## 2024-06-28 PROCEDURE — 36415 COLL VENOUS BLD VENIPUNCTURE: CPT

## 2024-06-28 PROCEDURE — 84443 ASSAY THYROID STIM HORMONE: CPT

## 2024-06-28 PROCEDURE — 80175 DRUG SCREEN QUAN LAMOTRIGINE: CPT

## 2024-06-28 PROCEDURE — 82746 ASSAY OF FOLIC ACID SERUM: CPT

## 2024-06-28 PROCEDURE — 82607 VITAMIN B-12: CPT

## 2024-06-28 PROCEDURE — 80053 COMPREHEN METABOLIC PANEL: CPT | Mod: 91

## 2024-06-28 PROCEDURE — 84439 ASSAY OF FREE THYROXINE: CPT

## 2024-06-28 PROCEDURE — 85025 COMPLETE CBC W/AUTO DIFF WBC: CPT | Mod: 91

## 2024-06-28 PROCEDURE — 80053 COMPREHEN METABOLIC PANEL: CPT

## 2024-06-28 PROCEDURE — 83525 ASSAY OF INSULIN: CPT

## 2024-06-28 PROCEDURE — 80061 LIPID PANEL: CPT

## 2024-06-28 PROCEDURE — 85025 COMPLETE CBC W/AUTO DIFF WBC: CPT

## 2024-06-30 LAB
INSULIN P FAST SERPL-ACNC: 10 UIU/ML (ref 3–25)
LAMOTRIGINE SERPL-MCNC: 1.9 UG/ML (ref 3–15)

## 2024-07-08 ENCOUNTER — APPOINTMENT (OUTPATIENT)
Dept: NEUROLOGY | Facility: MEDICAL CENTER | Age: 29
End: 2024-07-08
Attending: PSYCHIATRY & NEUROLOGY
Payer: COMMERCIAL

## 2024-08-16 PROCEDURE — RXMED WILLOW AMBULATORY MEDICATION CHARGE: Performed by: PSYCHIATRY & NEUROLOGY

## 2024-08-21 ENCOUNTER — OFFICE VISIT (OUTPATIENT)
Dept: NEUROLOGY | Facility: MEDICAL CENTER | Age: 29
End: 2024-08-21
Attending: PSYCHIATRY & NEUROLOGY
Payer: COMMERCIAL

## 2024-08-21 ENCOUNTER — PHARMACY VISIT (OUTPATIENT)
Dept: PHARMACY | Facility: MEDICAL CENTER | Age: 29
End: 2024-08-21
Payer: MEDICARE

## 2024-08-21 VITALS
TEMPERATURE: 98.3 F | SYSTOLIC BLOOD PRESSURE: 118 MMHG | OXYGEN SATURATION: 98 % | HEIGHT: 63 IN | WEIGHT: 207.23 LBS | DIASTOLIC BLOOD PRESSURE: 70 MMHG | RESPIRATION RATE: 14 BRPM | HEART RATE: 71 BPM | BODY MASS INDEX: 36.72 KG/M2

## 2024-08-21 DIAGNOSIS — R90.89 ABNORMAL FINDING ON MRI OF BRAIN: ICD-10-CM

## 2024-08-21 DIAGNOSIS — M54.50 CHRONIC MIDLINE LOW BACK PAIN, UNSPECIFIED WHETHER SCIATICA PRESENT: ICD-10-CM

## 2024-08-21 DIAGNOSIS — G89.29 CHRONIC MIDLINE LOW BACK PAIN, UNSPECIFIED WHETHER SCIATICA PRESENT: ICD-10-CM

## 2024-08-21 DIAGNOSIS — R56.9 SEIZURES (HCC): ICD-10-CM

## 2024-08-21 PROCEDURE — 99211 OFF/OP EST MAY X REQ PHY/QHP: CPT | Performed by: PSYCHIATRY & NEUROLOGY

## 2024-08-21 PROCEDURE — 3074F SYST BP LT 130 MM HG: CPT | Performed by: PSYCHIATRY & NEUROLOGY

## 2024-08-21 PROCEDURE — 99214 OFFICE O/P EST MOD 30 MIN: CPT | Performed by: PSYCHIATRY & NEUROLOGY

## 2024-08-21 PROCEDURE — 3078F DIAST BP <80 MM HG: CPT | Performed by: PSYCHIATRY & NEUROLOGY

## 2024-08-21 RX ORDER — LAMOTRIGINE 25 MG/1
50 TABLET, EXTENDED RELEASE ORAL DAILY
Qty: 180 TABLET | Refills: 1 | Status: SHIPPED | OUTPATIENT
Start: 2024-08-21

## 2024-08-21 ASSESSMENT — FIBROSIS 4 INDEX: FIB4 SCORE: 0.52

## 2024-08-21 ASSESSMENT — PATIENT HEALTH QUESTIONNAIRE - PHQ9: CLINICAL INTERPRETATION OF PHQ2 SCORE: 0

## 2024-08-21 NOTE — PROGRESS NOTES
[facility] Epilepsy Program  Follow Up Visit      Patient's Name: Theodora Ponce  YOB: 1995  MRN: [id number]  Date of Service: 08/21/2024.    Referring Provider: Onofre Torres M.D.  [address] [place] [address]  [place] [id number]    Chief Concern: Seizures and right frontal lobe T2 hyperintensities noted on MRI brain.    The patient presents for a follow up visit. She presents alone.     HPI (as obtained at the time of the initial visit and updated as necessary): The patient is a 28 y.o., right-handed female, who initially presented to my clinic for evaluation of seizures on 04/03/2023.    The patient was in her usual health until 2018, when she had appendicitis, followed by cholecystitis, followed by pleuritis, and then finally pericarditis; she was evaluated for an autoimmune disorder, but was never diagnosed with any particular autoimmune disorder. She was treated with steroids at that time and eventually got better. She eventually was evaluated at [facility] in [place], but that was at the time when her inflammation subsided.     During this period, she had her first bilateral tonic-clonic seizure, as noted under event type #1, and afterwards started having spells described under the event type #2.She was initially tried on Keppra, but due to mood issues switched to lamotrigine, and eventually to lamotrigine extended release. She had no adverse effects to lamotrigine - no skin rash, no insomnia, no dizziness/balance issues.     Due to some non-specific white matter changes noted on MRI brain, she was evaluated for MS, but she never had symptoms consistent with MS. She never had vision changes, Lhermitte's sign, bladder/bowel incontinence, difficulties walking, nor persistent excessive fatigue. She has been getting yearly MRI studies.     She recently completed PT for her lower back pain and sciatica and is doing fine at this time; no weakness, numbness, back pain, bladder, nor bowel  "incontinence.     She does have intermittent work finding difficulties since 2018.     Early in 2024, she was experiencing brief episodes of pins-and-needles mostly over her left arm, lasting 30-60 seconds, but these have resolved once lamotrigine SR was increased from 100 mg daily to 150 mg daily.     Semiology:    Event type #1: \"Seizures\".  Year/Age of Onset: 2018 (age 23)  Initial features: She feels fatigued before going to bed. All her seizures occurred out of sleep.   Event features: Bilateral tonic-clonic seizures, with no clear lateralization. Tongue bite, but no bladder/bowel incontinence. She does not have a recollection for the time during the event and she is not responsive during the event.   Post-ictal features: Angry and has to urinate right after a seizure. Confused and exhausted.   Duration: On average 1 minute, but one was 3.5 minutes.   Frequency: The first event was in August 2018. The second was 2 hours later. The third event occurred in December 2019.   Precipitating factors: Being off antiseizure medication (the last event was precipitated by being off Keppra).     Event type #2: \"Spells of metallic taste\".  Year/Age of Onset: 2018 (age 23)  Initial features: Metallic tasted in her mouth (like blood).  Event features: She experiences some nausea, heart racing, sweating, feels lighted, feels like something is coming over her, feels numb, has to sit down and \"breathe through it\". No confusion, no psychic phenomena, and no loss of time.   Post-ictal features: Feels a bit exhausted for a couple hours.   Duration: Seconds to minutes for most of her symptoms, but metallic taste and nausea up to couple hours.   Frequency: She used to have twice a month; she had none since she was switched to lamotrigine extended release around 2020.   Precipitating factors: Not clear.     History of status epilepticus: no  History of physical injury related to seizures: no  History of surgery related to epilepsy: " "no  Family Planning: No plans for pregnancy.  Current Driving Status: She does not have a 's license and does not drive.   Seizure Clusters: No  Longest Seizure Freedom: No seizures since December 2019.     Pertinent Ancillary Test Results:    MRI brain studies:   - MRI brain wo/w contrast (02/17/2021 at [facility]): \"IMPRESSION: 1. A couple nonspecific tiny T2 hyperintense foci in the right frontal periventricular white matter which are indeterminate and could represent nonspecific gliosis although cannot exclude tiny chronic demyelinating lesions. No abnormal enhancement.   2. No evidence of mass lesion, heterotopic gray matter, gross cortical dysplasia or mesial temporal sclerosis.     - MRI brain wo/w contrast (02/23/2022 at [facility]): \"No acute process. Stable appearance of tiny T2 hyperintense lesions in the right frontal region as described above, which may represent chronic demyelinating lesions. No new lesions are seen. No abnormal enhancement is identified.\"     - MRI brain w/wo contrast (01/15/2024 at [facility]): \"IMPRESSION: 1.  Stable few nonspecific supratentorial T2 hyperintensities. There are no new white matter T2 hyperintensities. 2.  History of seizures: There is no evidence of cortical dysplasia, or neuronal migrational abnormality or hippocampal sclerosis.\" I reviewed key images with the patient on 03/04/2024.     - MRI c-spine w/wo contrast (04/06/2021 @ [facility]): \"IMPRESSION:   1.  No abnormal cervical cord signal or enhancement.  2.  Minimal spondylosis. No spinal or foraminal stenosis.     MRI t-spine w/wo contrast (04/06/2021 [facility]): \"IMPRESSION:     1.  No abnormal thoracic cord signal or enhancement.  2.  T6-T7 small posterior disc extrusion.  3.  No significant thoracic spinal or foraminal stenosis.    EEG studies:   - Standard EEG (02/19/2021 Dr. Haque): This is a normal routine EEG recording in the awake, drowsy/sleep state(s).  Clinical " correlation is recommended. Note: A normal EEG does not rule out epilepsy.  If the clinical suspicion remains high for seizures, a prolonged recording to capture clinical or subclinical events may be helpful.    INTERIM HISTORY (08/21/2024): The patient had no stereotypical episodes consistent with her previously described seizures.  She is taking lamotrigine  mg daily and tolerates it well.    Early in 2024, she was experiencing brief episodes of pins-and-needles mostly over her left arm, lasting 30-60 seconds, but these have resolved once lamotrigine SR was increased from 100 mg daily to 150 mg daily.     She is following up with PCP for weight gain.    Her back pain is stable and no bladder/bowel incontinence.     Current Antiseizure Medications: Lamotrigine  mg daily.     Previously Tried Antiseizure Medications: Lamotrigine 100 mg daily. Keppra (had severe mood issues, suicidal ideation, and somnolence).     Review of Systems: No recent fevers. She gained additional 9 lbs in the interim (in addition to 17 lbs reported at the prior visit). No SI/HI.     Risk Factors For Epilepsy/Seizure Disorder: The patient is a product of premature pregnancy,  and the delivery was complicated with hypoxia due to cord wrapped around her neck; she was a blue baby. The early development was normal and the patient started waking, talking, and engaging in social interaction as expected. There were no challenges during school and no reported attendance of special education programs. There is no history of head trauma. There is no history of stroke. There is no history of CNS infections, such as encephalitis and/or meningitis. There is no personal history of febrile seizures. There is no history of neurosurgical interventions. There is no reported history of staring spells during childhood/school years.    Past Medical History:  Past Medical History:   Diagnosis Date    Pericarditis 05/2018    Seizure disorder (HCC)      "Tonsillitis      Past Surgical History:  Past Surgical History:   Procedure Laterality Date    CHOLECYSTECTOMY  05/2018    APPENDECTOMY  04/2018    TONSILLECTOMY  2/24/2010    Performed by YULIYA PEDERSEN at SURGERY SAME DAY [facility] ORS      Social History:  Social History     Tobacco Use    Smoking status: Never    Smokeless tobacco: Never   Vaping Use    Vaping status: Never Used   Substance Use Topics    Alcohol use: Yes     Alcohol/week: 1.2 - 1.8 oz     Types: 2 - 3 Standard drinks or equivalent per week     Comment: occ    Drug use: No     Family History:  There is known family history of febrile seizures - her sister and her sister's son had febrile seizures.        8/21/2024     3:30 PM 3/4/2024    11:30 AM 4/3/2023     8:40 AM 2/3/2021     7:20 AM   PHQ-9 Screening   Little interest or pleasure in doing things 0 - not at all 0 - not at all 0 - not at all 0 - not at all   Feeling down, depressed, or hopeless 0 - not at all 0 - not at all 0 - not at all 0 - not at all   PHQ-2 Total Score 0 0 0 0     Carroll Suicide Reassessment  New or continued thoughts about killing self?: No  Preparing to end life?: No    Allergies:  Allergies   Allergen Reactions    Vicodin [Hydrocodone-Acetaminophen] Hives    Hydrocodone-Acetaminophen Hives     Current Medications:    Current Outpatient Medications:     LamoTRIgine, 50 mg, Oral, DAILY, Taking    LamoTRIgine, 100 mg, Oral, QHS, Taking    valACYclovir, TAKE 4 TABLETS AT FIRST SIGN OF ATTACK AND THEN TAKE 4 TABLETS 12 HOURS LATER, Taking    methocarbamol, Take  by mouth 4 times a day as needed., Taking    Physical Examination:    Ambulatory Vitals  Encounter Vitals  Temperature: 36.8 °C (98.3 °F)  Temp src: Temporal  Blood Pressure: 118/70  Pulse: 71  Respiration: 14  Pulse Oximetry: 98 %  Weight: 94 kg (207 lb 3.7 oz)  Height: 160 cm (5' 3\")  BMI (Calculated): 36.71    Neurological Examination:  Mental Status: The patient is alert and oriented to person, place, time, " and situation. Speech is fluent, with no aphasia nor dysarthria noted. Affect is normal.    Cranial Nerve Examination:  CN I: Olfaction examination is deferred.  CN II: Visual fields are full to confrontation examination and show no visual field defect.   CN III, IV, VI: Eye movements are normal in all directions. Pupils are reactive to direct and consensual light. There is no relative afferent pupillary defect. There is no nystagmus.  CN V: Facial sensation to light touch is intact throughout.   CN VII: No significant facial muscle or other soft tissue asymmetry.  CN VIII: Hearing intact to rubbing sounds bilaterally.   CN IX, X: Soft palate elevates symmetrically.  CN XI: Symmetrical shoulder shrug exam.  CN XII: Midline tongue protrusion and moves symmetrically to each side.     Motor Examination:  Muscle strength is intact throughout. Muscle tone is normal throughout. No abnormal movements are observed. No pronator drift is noted.     Muscle Stretch Reflexes Examination:  Muscle stretch reflexes are normal throughout and symmetric.    Sensory Examination:  Preserved sensation to light touch in all extremities.     Coordination:  Normal finger to nose testing bilaterally, no postural nor intentional tremor was noted.     Stance/gait:  Normal regular gait with normal arm swings and stride length. Able to perform tandem gait. Romberg sign is absent.     Labs:   - lamotrigine (12/19/2023): 1.9   - lamotrigine (06/28/2024): 1.9    ASSESSMENT AND PLAN:    1. 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 The etiology is most likely related to the reported hypoxia at birth in context of umbilical cord being around her neck; she also has family history of febrile seizures. The systemic inflammatory event in 2018 was likely a precipitating factor.  She is doing well on lamotrigine at this time and has no adverse effects from it.    We  reviewed her most recent blood work - lamotrigine still on low side, but no seizures, so we will continue lamotrigine dose with no changes at this time.     The patient is doing well on the current dose of lamotrigine. We will continue it with no changes.   - LamoTRIgine 25 MG TABLET SR 24 HR; Take 1 tablet by mouth every day.  Dispense: 180 Tablet; Refill: 1    Discussed future pregnancy, and a need to obtain lamotrigine level prior to conceiving, and then getting lamotrigine level regularly after that.    She was cleared to drive today since she remains seizure free.     Epilepsy counseling provided in writing.    2. Concern for demyelinating disorder/abnormal finding on MRI brain.  In context of her non-specific, right frontal, white matter lesions, a concern was raised that she has an MS or a similar demyelinating disorder. Her MRI brain imaging remained stable and her MRI c-spine and t-spine showed no demyelinating lesions.  - we reviewed symptoms of MS and she has none; her exam was normal as well.  - we reviewed her most recent MRI brain, which is stable.  - we will plan for repeat MRI brain for January 2025.    3. Lower back pain and sciatica (history of).  Her symptoms got better after chiropractic treatment.  - no symptoms nor signs of myelopathy at this time  - we had a discussion that if she experience any new neurological symptoms, such as new pain in her neck/back, weakness/numbness, difficulties walking, and/or bladder/bowel incontinence, among others, to seek emergent medical help; the patient verbalized understanding and agreement.   - she should also continue to follow up with spine specialist as needed.    The patient was advised to follow-up with her primary care physician for the above described episode of shortness of breath, as well as weight gain, to check her thyroid.    Patient Instructions   Please take lamotrigine SR24 150 mg every day.    Please stop lamotrigine immediately and  seek  emergent medical attention if you experience any spreading skin rash, lesions in your mouth/private areas, and/or fever.     Please continue vitamin D supplementation.    Please let our office know if you have any changes in your seizure frequency and/characteristics. Otherwise, please keep the diary of your events and bring it with you at the time of your next follow up visit with our office.     Please take folic acid 1 mg daily. This is an over-the-counter supplement that is recommended to prevent certain developmental problems in your baby, in case you become pregnant in the future.    Please let our office know as soon as you become pregnant or plan to become pregnant.    If you are caring for a baby/young child, please make sure to be sitting on a soft surface while holding your baby/young child, so in case you have a seizure, your baby/young child is not injured due to fall.     Please let us know if you observe that your baby is excessively sleepy/has other changes and the pediatrician feels that there are no other explanations except possible adverse effects of antiseizure medication(s) your are taking while nursing your baby.     Please note that the following might precipitate seizures: missed doses of antiseizure medications, being sick with fever, stress, fatigue, sleep deprivation, not eating regularly, not drinking enough water, drinking too much alcohol, stopping alcohol suddenly if you are currently using it on a regular/daily basis, and/or using recreational drugs, among others.    Please note that the following might lead to an injury, potentially a life-threatening injury, in case you have a seizure and/or lose awareness while:   - being in a large body of water by yourself, such as bath, pool, lake, ocean, among others (risk of drowning)   - being on unprotected heights (risk of fall)   - being around and/or operating heavy machinery (risk of injury)   - being around open fire/hot surfaces (risk  of burns)   - any other activities/circumstances, in which if you lose awareness, you might injure yourself and/or others.    Please call for help (crisis line and/or 911) in case you have thoughts of harming yourself and/or others.    Please abstain from driving until further notice.    ------------------------------------------------------------------------------------------  Instructions for your family/caregivers:  Please call 911 if the patient has a seizure longer than 2-3 minutes, if seizures are back to back without him recovering to his baseline, or he does not start recovering within 5-10 minutes after the seizure stops. During the seizure - please turn him on his side, please make sure his head is protected (for example, you should put a pillow under his head, if one is available), and please do not put anything in his mouth.   -------------------------------------------------------------------------------------------    It is important that your seizures are well controlled and you have none or have them rarely. In addition to avoiding injury related to breakthrough seizures, frequent seizures increase risk of SUDEP (sudden unexpected death in epilepsy), where a person goes into a seizure and then never wakes up - this is a rare complication of seizure disorder; one of the best available ways to prevent it is to control your seizures well.     Due to the high volume of patients we are trying to help, your physician will not be able to respond by phone or in MyChart to your routine concerns between appointments.  This does not reflect a lack of interest or concern for you or your diagnosis.  Please bring these questions and concerns to your appointment where your physician can answer.  Please relay more pressing concerns to our office, either via MyChart, or by phone; if not able to reach us please visit nearby Urgent Care Center or Emergency Department.  If any emergent medical needs, please seek  emergent medical help and/or call 911.    Please note that we are not able to fill out paperwork that might be related to your work, utility company, disability, and/or driving, among others, in between the visits.  Please schedule a dedicated appointment to address your paperwork, so we can do that in a timely manner.  This is not due to lack of concern or interest for your disease-related work/administrative problems, but to make sure that we provide the best possible care and to fill out your paperwork in a correct and timely manner.    Thank you for entrusting your neurological care to [facility] and we look forward to continuing to serve you.       Due to the high volume of patients we are trying to help, your physician will not be able to respond by phone or in MyChart to your routine concerns between appointments.  This does not reflect a lack of interest or concern for you or your diagnosis.  Please bring these questions and concerns to your appointment where your physician can answer.  Please relay more pressing concerns to our office, either via MyChart, or by phone; if not able to reach us please visit nearby Urgent Care Center or Emergency Department.  If any emergent medical needs, please seek emergent medical help and/or call 911.    Please note that we are not able to fill out paperwork that might be related to your work, utility company, disability, and/or driving, among others, in between the visits.  Please schedule a dedicated appointment to address your paperwork, so we can do that in a timely manner.  This is not due to lack of concern or interest for your disease-related work/administrative problems, but to make sure that we provide the best possible care and to fill out your paperwork in a correct and timely manner.    Thank you for entrusting your neurological care to [facility] and we look forward to continuing to serve you.    Follow up in 4 months.     Marvin De Jesus  MD  Neurology Attending, Epilepsy Program  [facility]

## 2024-08-21 NOTE — PATIENT INSTRUCTIONS
Please take lamotrigine SR24 150 mg every day.    Please stop lamotrigine immediately and  seek emergent medical attention if you experience any spreading skin rash, lesions in your mouth/private areas, and/or fever.     Please continue vitamin D supplementation.    Please let our office know if you have any changes in your seizure frequency and/characteristics. Otherwise, please keep the diary of your events and bring it with you at the time of your next follow up visit with our office.     Please take folic acid 1 mg daily. This is an over-the-counter supplement that is recommended to prevent certain developmental problems in your baby, in case you become pregnant in the future.    Please let our office know as soon as you become pregnant or plan to become pregnant.    If you are caring for a baby/young child, please make sure to be sitting on a soft surface while holding your baby/young child, so in case you have a seizure, your baby/young child is not injured due to fall.     Please let us know if you observe that your baby is excessively sleepy/has other changes and the pediatrician feels that there are no other explanations except possible adverse effects of antiseizure medication(s) your are taking while nursing your baby.     Please note that the following might precipitate seizures: missed doses of antiseizure medications, being sick with fever, stress, fatigue, sleep deprivation, not eating regularly, not drinking enough water, drinking too much alcohol, stopping alcohol suddenly if you are currently using it on a regular/daily basis, and/or using recreational drugs, among others.    Please note that the following might lead to an injury, potentially a life-threatening injury, in case you have a seizure and/or lose awareness while:   - being in a large body of water by yourself, such as bath, pool, lake, ocean, among others (risk of drowning)   - being on unprotected heights (risk of fall)   - being around  and/or operating heavy machinery (risk of injury)   - being around open fire/hot surfaces (risk of burns)   - any other activities/circumstances, in which if you lose awareness, you might injure yourself and/or others.    Please call for help (crisis line and/or 911) in case you have thoughts of harming yourself and/or others.    Please abstain from driving until further notice.    ------------------------------------------------------------------------------------------  Instructions for your family/caregivers:  Please call 911 if the patient has a seizure longer than 2-3 minutes, if seizures are back to back without him recovering to his baseline, or he does not start recovering within 5-10 minutes after the seizure stops. During the seizure - please turn him on his side, please make sure his head is protected (for example, you should put a pillow under his head, if one is available), and please do not put anything in his mouth.   -------------------------------------------------------------------------------------------    It is important that your seizures are well controlled and you have none or have them rarely. In addition to avoiding injury related to breakthrough seizures, frequent seizures increase risk of SUDEP (sudden unexpected death in epilepsy), where a person goes into a seizure and then never wakes up - this is a rare complication of seizure disorder; one of the best available ways to prevent it is to control your seizures well.     Due to the high volume of patients we are trying to help, your physician will not be able to respond by phone or in MyChart to your routine concerns between appointments.  This does not reflect a lack of interest or concern for you or your diagnosis.  Please bring these questions and concerns to your appointment where your physician can answer.  Please relay more pressing concerns to our office, either via [facility], or by phone; if not able to reach us please visit nearby  Urgent Care Center or Emergency Department.  If any emergent medical needs, please seek emergent medical help and/or call 911.    Please note that we are not able to fill out paperwork that might be related to your work, utility company, disability, and/or driving, among others, in between the visits.  Please schedule a dedicated appointment to address your paperwork, so we can do that in a timely manner.  This is not due to lack of concern or interest for your disease-related work/administrative problems, but to make sure that we provide the best possible care and to fill out your paperwork in a correct and timely manner.    Thank you for entrusting your neurological care to [facility] and we look forward to continuing to serve you.       Due to the high volume of patients we are trying to help, your physician will not be able to respond by phone or in [facility] to your routine concerns between appointments.  This does not reflect a lack of interest or concern for you or your diagnosis.  Please bring these questions and concerns to your appointment where your physician can answer.  Please relay more pressing concerns to our office, either via [facility], or by phone; if not able to reach us please visit nearby Urgent Care Center or Emergency Department.  If any emergent medical needs, please seek emergent medical help and/or call 911.    Please note that we are not able to fill out paperwork that might be related to your work, utility company, disability, and/or driving, among others, in between the visits.  Please schedule a dedicated appointment to address your paperwork, so we can do that in a timely manner.  This is not due to lack of concern or interest for your disease-related work/administrative problems, but to make sure that we provide the best possible care and to fill out your paperwork in a correct and timely manner.    Thank you for entrusting your neurological care to [facility] and we look  forward to continuing to serve you.

## 2024-08-22 ENCOUNTER — PHARMACY VISIT (OUTPATIENT)
Dept: PHARMACY | Facility: MEDICAL CENTER | Age: 29
End: 2024-08-22
Payer: MEDICARE

## 2024-08-22 PROCEDURE — RXMED WILLOW AMBULATORY MEDICATION CHARGE: Performed by: PSYCHIATRY & NEUROLOGY

## 2024-08-22 PROCEDURE — RXOTC WILLOW AMBULATORY OTC CHARGE

## 2024-12-18 ENCOUNTER — APPOINTMENT (OUTPATIENT)
Dept: NEUROLOGY | Facility: MEDICAL CENTER | Age: 29
End: 2024-12-18
Attending: PSYCHIATRY & NEUROLOGY
Payer: COMMERCIAL

## 2025-03-12 ENCOUNTER — OFFICE VISIT (OUTPATIENT)
Dept: NEUROLOGY | Facility: MEDICAL CENTER | Age: 30
End: 2025-03-12
Attending: PSYCHIATRY & NEUROLOGY
Payer: COMMERCIAL

## 2025-03-12 VITALS
OXYGEN SATURATION: 99 % | DIASTOLIC BLOOD PRESSURE: 64 MMHG | BODY MASS INDEX: 33.49 KG/M2 | WEIGHT: 189 LBS | SYSTOLIC BLOOD PRESSURE: 102 MMHG | HEIGHT: 63 IN | TEMPERATURE: 97.6 F | HEART RATE: 82 BPM | RESPIRATION RATE: 16 BRPM

## 2025-03-12 DIAGNOSIS — R90.89 ABNORMAL FINDING ON MRI OF BRAIN: ICD-10-CM

## 2025-03-12 DIAGNOSIS — M54.50 CHRONIC MIDLINE LOW BACK PAIN, UNSPECIFIED WHETHER SCIATICA PRESENT: ICD-10-CM

## 2025-03-12 DIAGNOSIS — G89.29 CHRONIC MIDLINE LOW BACK PAIN, UNSPECIFIED WHETHER SCIATICA PRESENT: ICD-10-CM

## 2025-03-12 DIAGNOSIS — R56.9 SEIZURES (HCC): ICD-10-CM

## 2025-03-12 PROCEDURE — 99211 OFF/OP EST MAY X REQ PHY/QHP: CPT | Performed by: PSYCHIATRY & NEUROLOGY

## 2025-03-12 PROCEDURE — 3074F SYST BP LT 130 MM HG: CPT | Performed by: PSYCHIATRY & NEUROLOGY

## 2025-03-12 PROCEDURE — 3078F DIAST BP <80 MM HG: CPT | Performed by: PSYCHIATRY & NEUROLOGY

## 2025-03-12 PROCEDURE — 99214 OFFICE O/P EST MOD 30 MIN: CPT | Performed by: PSYCHIATRY & NEUROLOGY

## 2025-03-12 RX ORDER — LAMOTRIGINE 100 MG/1
100 TABLET, EXTENDED RELEASE ORAL
Qty: 90 TABLET | Refills: 2 | Status: SHIPPED | OUTPATIENT
Start: 2025-03-12

## 2025-03-12 RX ORDER — LAMOTRIGINE 25 MG/1
50 TABLET, EXTENDED RELEASE ORAL NIGHTLY
Qty: 180 TABLET | Refills: 2 | Status: SHIPPED | OUTPATIENT
Start: 2025-03-12

## 2025-03-12 ASSESSMENT — FIBROSIS 4 INDEX: FIB4 SCORE: 0.54

## 2025-03-12 ASSESSMENT — PATIENT HEALTH QUESTIONNAIRE - PHQ9: CLINICAL INTERPRETATION OF PHQ2 SCORE: 0

## 2025-03-12 NOTE — PROGRESS NOTES
[facility]psy [facility]  Follow Up Visit      Patient's Name: Theodora Ponce  YOB: 1995  MRN: [id number]  Date of Service: 03/12/2025.    Referring Provider: Onofre Torres M.D.  [address] [place] [address]  [place] [id number]    Chief Concern: Seizures and right frontal lobe T2 hyperintensities noted on MRI brain.    The patient presents for a follow up visit. She presents alone.      HPI (as obtained at the time of the initial visit and updated as necessary): The patient is a 29 y.o., right-handed female, who initially presented to my clinic for evaluation of seizures on 04/03/2023.    The patient was in her usual health until 2018, when she had appendicitis, followed by cholecystitis, followed by pleuritis, and then finally pericarditis; she was evaluated for an autoimmune disorder, but was never diagnosed with any particular autoimmune disorder. She was treated with steroids at that time and eventually got better. She eventually was evaluated at [facility] in [place], but that was at the time when her inflammation subsided.     During this period, she had her first bilateral tonic-clonic seizure, as noted under event type #1, and afterwards started having spells described under the event type #2.She was initially tried on Keppra, but due to mood issues switched to lamotrigine, and eventually to lamotrigine extended release. She had no adverse effects to lamotrigine - no skin rash, no insomnia, no dizziness/balance issues.     Due to some non-specific white matter changes noted on MRI brain, she was evaluated for MS, but she never had symptoms consistent with MS. She never had vision changes, Lhermitte's sign, bladder/bowel incontinence, difficulties walking, nor persistent excessive fatigue. She has been getting yearly MRI studies.     She recently completed PT for her lower back pain and sciatica and is doing fine at this time; no weakness, numbness, back pain, bladder, nor  "bowel incontinence.     She does have intermittent work finding difficulties since 2018.     Early in 2024, she was experiencing brief episodes of pins-and-needles mostly over her left arm, lasting 30-60 seconds, but these have resolved once lamotrigine SR was increased from 100 mg daily to 150 mg daily.     Semiology:    Event type #1: \"Seizures\".  Year/Age of Onset: 2018 (age 23)  Initial features: She feels fatigued before going to bed. All her seizures occurred out of sleep.   Event features: Bilateral tonic-clonic seizures, with no clear lateralization. Tongue bite, but no bladder/bowel incontinence. She does not have a recollection for the time during the event and she is not responsive during the event.   Post-ictal features: Angry and has to urinate right after a seizure. Confused and exhausted.   Duration: On average 1 minute, but one was 3.5 minutes.   Frequency: The first event was in August 2018. The second was 2 hours later. The third event occurred in December 2019.   Precipitating factors: Being off antiseizure medication (the last event was precipitated by being off Keppra).     Event type #2: \"Spells of metallic taste\".  Year/Age of Onset: 2018 (age 23)  Initial features: Metallic tasted in her mouth (like blood).  Event features: She experiences some nausea, heart racing, sweating, feels lighted, feels like something is coming over her, feels numb, has to sit down and \"breathe through it\". No confusion, no psychic phenomena, and no loss of time.   Post-ictal features: Feels a bit exhausted for a couple hours.   Duration: Seconds to minutes for most of her symptoms, but metallic taste and nausea up to couple hours.   Frequency: She used to have twice a month; she had none since she was switched to lamotrigine extended release around 2020.   Precipitating factors: Not clear.     History of status epilepticus: no  History of physical injury related to seizures: no  History of surgery related to " "epilepsy: no  Family Planning: No plans for pregnancy.  Current Driving Status: She does not have a 's license and does not drive.   Seizure Clusters: No  Longest Seizure Freedom: No seizures since December 2019.     Pertinent Ancillary Test Results:    MRI brain studies:   - MRI brain wo/w contrast (02/17/2021 at [facility]): \"IMPRESSION: 1. A couple nonspecific tiny T2 hyperintense foci in the right frontal periventricular white matter which are indeterminate and could represent nonspecific gliosis although cannot exclude tiny chronic demyelinating lesions. No abnormal enhancement.   2. No evidence of mass lesion, heterotopic gray matter, gross cortical dysplasia or mesial temporal sclerosis.     - MRI brain wo/w contrast (02/23/2022 at [facility]): \"No acute process. Stable appearance of tiny T2 hyperintense lesions in the right frontal region as described above, which may represent chronic demyelinating lesions. No new lesions are seen. No abnormal enhancement is identified.\"     - MRI brain w/wo contrast (01/15/2024 at [facility]): \"IMPRESSION: 1.  Stable few nonspecific supratentorial T2 hyperintensities. There are no new white matter T2 hyperintensities. 2.  History of seizures: There is no evidence of cortical dysplasia, or neuronal migrational abnormality or hippocampal sclerosis.\" I reviewed key images with the patient on 03/04/2024.     - MRI c-spine w/wo contrast (04/06/2021 @ [facility] [place]): \"IMPRESSION:   1.  No abnormal cervical cord signal or enhancement.  2.  Minimal spondylosis. No spinal or foraminal stenosis.     MRI t-spine w/wo contrast (04/06/2021 [facility] [place]): \"IMPRESSION:   1.  No abnormal thoracic cord signal or enhancement.  2.  T6-T7 small posterior disc extrusion.  3.  No significant thoracic spinal or foraminal stenosis.    EEG studies:   - Standard EEG (02/19/2021 Dr. Haque): This is a normal routine EEG recording in the awake, drowsy/sleep state(s).  " Clinical correlation is recommended. Note: A normal EEG does not rule out epilepsy.  If the clinical suspicion remains high for seizures, a prolonged recording to capture clinical or subclinical events may be helpful.    INTERIM HISTORY (03/12/2025): The patient had no events suspicious for seizures. Specifically, no staring spells, convulsions, nor episodes of waking up in the morning sore/wetted/with blood in her mouth. She is taking lamotrigine  mg nightly and tolerates it well.    She is trying to get pregnant.    She has no symptoms suspicious for MS.     The patient has intermittent episodes of feeling pins and needles in her chest and both arms, and it seems these are related to wearing of phenomena of injections she is receiving for her past lung challenges from 2018.     Her back pain is stable and no bladder/bowel incontinence reported.     Current Antiseizure Medications: Lamotrigine  mg daily.     Previously Tried Antiseizure Medications: Lamotrigine 100 mg daily. Keppra (had severe mood issues, suicidal ideation, and somnolence).     Review of Systems: No recent fevers. She lost 18 lbs in the interim (intentionally). No SI/HI.     Risk Factors For Epilepsy/Seizure Disorder: The patient is a product of premature pregnancy,  and the delivery was complicated with hypoxia due to cord wrapped around her neck; she was a blue baby. The early development was normal and the patient started waking, talking, and engaging in social interaction as expected. There were no challenges during school and no reported attendance of special education programs. There is no history of head trauma. There is no history of stroke. There is no history of CNS infections, such as encephalitis and/or meningitis. There is no personal history of febrile seizures. There is no history of neurosurgical interventions. There is no reported history of staring spells during childhood/school years.    Past Medical History:  Past  Medical History:   Diagnosis Date    Pericarditis 05/2018    Seizure disorder (HCC)     Tonsillitis      Past Surgical History:  Past Surgical History:   Procedure Laterality Date    CHOLECYSTECTOMY  05/2018    APPENDECTOMY  04/2018    TONSILLECTOMY  2/24/2010    Performed by YULIYA PEDERSEN at SURGERY SAME DAY [facility]      Social History:  Social History     Tobacco Use    Smoking status: Never    Smokeless tobacco: Never   Vaping Use    Vaping status: Never Used   Substance Use Topics    Alcohol use: Yes     Alcohol/week: 1.2 - 1.8 oz     Types: 2 - 3 Standard drinks or equivalent per week     Comment: occ    Drug use: No     Family History:  There is known family history of febrile seizures - her sister and her sister's son had febrile seizures.        3/12/2025     4:00 PM 8/21/2024     3:30 PM 3/4/2024    11:30 AM 4/3/2023     8:40 AM 2/3/2021     7:20 AM   PHQ-9 Screening   Little interest or pleasure in doing things 0 - not at all 0 - not at all 0 - not at all 0 - not at all 0 - not at all   Feeling down, depressed, or hopeless 0 - not at all 0 - not at all 0 - not at all 0 - not at all 0 - not at all   PHQ-2 Total Score 0 0 0 0 0     [place] Suicide Reassessment  New or continued thoughts about killing self?: No  Preparing to end life?: No    Allergies:  Allergies   Allergen Reactions    Vicodin [Hydrocodone-Acetaminophen] Hives    Hydrocodone-Acetaminophen Hives     Current Medications:    Current Outpatient Medications:     LamoTRIgine, 50 mg, Oral, DAILY, Taking    LamoTRIgine, 100 mg, Oral, QHS, Taking    valACYclovir, TAKE 4 TABLETS AT FIRST SIGN OF ATTACK AND THEN TAKE 4 TABLETS 12 HOURS LATER, PRN    methocarbamol, Take  by mouth 4 times a day as needed., PRN    Physical Examination:    Ambulatory Vitals  Encounter Vitals  Temperature: 36.4 °C (97.6 °F)  Temp src: Temporal  Blood Pressure: 102/64  Pulse: 82  Respiration: 16  Pulse Oximetry: 99 %  Weight: 85.7 kg (189 lb)  Height: 160 cm (5'  "3\")  BMI (Calculated): 33.48    Neurological Examination:  Mental Status: The patient is alert and oriented to person, place, time, and situation. Speech is fluent, with no aphasia nor dysarthria noted. Affect is normal.    Cranial Nerve Examination:  CN I: Olfaction examination is deferred.  CN II: Visual fields are full to confrontation examination and show no visual field defect.   CN III, IV, VI: Eye movements are normal in all directions. Pupils are reactive to direct and consensual light. There is no relative afferent pupillary defect. There is no nystagmus.  CN V: Facial sensation to light touch is intact throughout.   CN VII: No significant facial muscle or other soft tissue asymmetry.  CN VIII: Hearing intact to rubbing sounds bilaterally.   CN IX, X: Soft palate elevates symmetrically.  CN XI: Symmetrical shoulder shrug exam.  CN XII: Midline tongue protrusion and moves symmetrically to each side.     Motor Examination:  Muscle strength is intact throughout. Muscle tone is normal throughout. No abnormal movements are observed. No pronator drift is noted.     Muscle Stretch Reflexes Examination:  Deferred.     Sensory Examination:  Preserved sensation to light touch in all extremities.     Coordination:  Normal finger to nose testing bilaterally, no postural nor intentional tremor was noted.     Stance/gait:  Normal regular gait with normal arm swings and stride length. Able to perform tandem gait. Romberg sign is absent.     Labs:   - lamotrigine (12/19/2023): 1.9   - lamotrigine (06/28/2024): 1.9    ASSESSMENT AND PLAN:    1. 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 The etiology is most likely related to the reported hypoxia at birth in context of umbilical cord being around her neck; she also has family history of febrile seizures. The systemic inflammatory event in 2018 was likely a precipitating factor.  She " is doing well on lamotrigine at this time and has no adverse effects from it.    We will continue lamotrigine with no changes at this time. She is aware that she needs to let our office know as soon as she learns she is pregnant.   - LamoTRIgine 100 MG TABLET SR 24 HR; Take 100 mg by mouth at bedtime.  Dispense: 90 Tablet; Refill: 2  - LamoTRIgine 25 MG TABLET SR 24 HR; Take 50 mg by mouth every evening.  Dispense: 180 Tablet; Refill: 2    We will obtain baseline labs:  - CBC WITH DIFFERENTIAL; Future  - Comp Metabolic Panel; Future  - LAMOTRIGINE; Future    No need for repeat EEG at this time.     Discussed again future pregnancy, and a need to obtain lamotrigine level prior to conceiving, and then getting lamotrigine level regularly after that.    She is working on driving with no problems.     Epilepsy counseling provided in writing.    2. Concern for demyelinating disorder/abnormal finding on MRI brain.  In context of her non-specific, right frontal, white matter lesions, a concern was raised that she has an MS or a similar demyelinating disorder. Her MRI brain imaging remained stable and her MRI c-spine and t-spine showed no demyelinating lesions.  - we again reviewed symptoms of MS and she has none; her exam was normal as well.  - repeat MRI brain ordered (if unremarkable, we will plan for repeat MRI brain every 2-3 years with no contrast):   - MR-BRAIN-W/O; Future    3. Lower back pain and sciatica (history of).  Her symptoms got better after chiropractic treatment.  - no symptoms nor signs of myelopathy at this time  - we had a discussion in the past that if she experience any new neurological symptoms, such as new pain in her neck/back, weakness/numbness, difficulties walking, and/or bladder/bowel incontinence, among others, to seek emergent medical help; the patient verbalized understanding and agreement.   - she should also continue to follow up with spine specialist as needed.    The patient was advised  to follow-up with her primary care physician for her other medical problems.     Patient Instructions   Please take lamotrigine SR24 150 mg every day.    Please stop lamotrigine immediately and  seek emergent medical attention if you experience any spreading skin rash, lesions in your mouth/private areas, and/or fever.     Please continue vitamin D supplementation.    Please let our office know if you have any changes in your seizure frequency and/characteristics. Otherwise, please keep the diary of your events and bring it with you at the time of your next follow up visit with our office.     Please take folic acid 1 mg daily. This is an over-the-counter supplement that is recommended to prevent certain developmental problems in your baby, in case you become pregnant in the future.    Please let our office know as soon as you become pregnant or plan to become pregnant.    If you are caring for a baby/young child, please make sure to be sitting on a soft surface while holding your baby/young child, so in case you have a seizure, your baby/young child is not injured due to fall.     Please let us know if you observe that your baby is excessively sleepy/has other changes and the pediatrician feels that there are no other explanations except possible adverse effects of antiseizure medication(s) your are taking while nursing your baby.     Please note that the following might precipitate seizures: missed doses of antiseizure medications, being sick with fever, stress, fatigue, sleep deprivation, not eating regularly, not drinking enough water, drinking too much alcohol, stopping alcohol suddenly if you are currently using it on a regular/daily basis, and/or using recreational drugs, among others.    Please note that the following might lead to an injury, potentially a life-threatening injury, in case you have a seizure and/or lose awareness while:   - being in a large body of water by yourself, such as bath, pool, lake,  ocean, among others (risk of drowning)   - being on unprotected heights (risk of fall)   - being around and/or operating heavy machinery (risk of injury)   - being around open fire/hot surfaces (risk of burns)   - any other activities/circumstances, in which if you lose awareness, you might injure yourself and/or others.    Please call for help (crisis line and/or 911) in case you have thoughts of harming yourself and/or others.    Please abstain from driving until further notice.    ------------------------------------------------------------------------------------------  Instructions for your family/caregivers:  Please call 911 if the patient has a seizure longer than 2-3 minutes, if seizures are back to back without him recovering to his baseline, or he does not start recovering within 5-10 minutes after the seizure stops. During the seizure - please turn him on his side, please make sure his head is protected (for example, you should put a pillow under his head, if one is available), and please do not put anything in his mouth.   -------------------------------------------------------------------------------------------    It is important that your seizures are well controlled and you have none or have them rarely. In addition to avoiding injury related to breakthrough seizures, frequent seizures increase risk of SUDEP (sudden unexpected death in epilepsy), where a person goes into a seizure and then never wakes up - this is a rare complication of seizure disorder; one of the best available ways to prevent it is to control your seizures well.     Due to the high volume of patients we are trying to help, your physician will not be able to respond by phone or in MyChart to your routine concerns between appointments.  This does not reflect a lack of interest or concern for you or your diagnosis.  Please bring these questions and concerns to your appointment where your physician can answer.  Please relay more  pressing concerns to our office, either via MyChart, or by phone; if not able to reach us please visit nearby Urgent Care Center or Emergency Department.  If any emergent medical needs, please seek emergent medical help and/or call 911.    Please note that we are not able to fill out paperwork that might be related to your work, utility company, disability, and/or driving, among others, in between the visits.  Please schedule a dedicated appointment to address your paperwork, so we can do that in a timely manner.  This is not due to lack of concern or interest for your disease-related work/administrative problems, but to make sure that we provide the best possible care and to fill out your paperwork in a correct and timely manner.    Thank you for entrusting your neurological care to [facility] and we look forward to continuing to serve you.       Due to the high volume of patients we are trying to help, your physician will not be able to respond by phone or in compropagohart to your routine concerns between appointments.  This does not reflect a lack of interest or concern for you or your diagnosis.  Please bring these questions and concerns to your appointment where your physician can answer.  Please relay more pressing concerns to our office, either via MyChart, or by phone; if not able to reach us please visit nearby Urgent Care Center or Emergency Department.  If any emergent medical needs, please seek emergent medical help and/or call 911.    Please note that we are not able to fill out paperwork that might be related to your work, utility company, disability, and/or driving, among others, in between the visits.  Please schedule a dedicated appointment to address your paperwork, so we can do that in a timely manner.  This is not due to lack of concern or interest for your disease-related work/administrative problems, but to make sure that we provide the best possible care and to fill out your paperwork in a  correct and timely manner.    Thank you for entrusting your neurological care to [facility] and we look forward to continuing to serve you.    Follow up in 4 months.     Marvin De Jesus MD  Neurology Attending, Epilepsy Program  [facility]

## 2025-03-30 ENCOUNTER — HOSPITAL ENCOUNTER (OUTPATIENT)
Dept: RADIOLOGY | Facility: MEDICAL CENTER | Age: 30
End: 2025-03-30
Attending: PSYCHIATRY & NEUROLOGY
Payer: COMMERCIAL

## 2025-03-30 DIAGNOSIS — R90.89 ABNORMAL FINDING ON MRI OF BRAIN: ICD-10-CM

## 2025-03-30 PROCEDURE — 70551 MRI BRAIN STEM W/O DYE: CPT

## 2025-04-04 ENCOUNTER — PHARMACY VISIT (OUTPATIENT)
Dept: PHARMACY | Facility: MEDICAL CENTER | Age: 30
End: 2025-04-04
Payer: MEDICARE

## 2025-04-04 PROCEDURE — RXMED WILLOW AMBULATORY MEDICATION CHARGE: Performed by: PSYCHIATRY & NEUROLOGY

## 2025-06-17 ENCOUNTER — HOSPITAL ENCOUNTER (OUTPATIENT)
Dept: LAB | Facility: MEDICAL CENTER | Age: 30
End: 2025-06-17
Attending: NURSE PRACTITIONER
Payer: COMMERCIAL

## 2025-06-17 ENCOUNTER — HOSPITAL ENCOUNTER (OUTPATIENT)
Dept: LAB | Facility: MEDICAL CENTER | Age: 30
End: 2025-06-17
Attending: PSYCHIATRY & NEUROLOGY
Payer: COMMERCIAL

## 2025-06-17 DIAGNOSIS — R90.89 ABNORMAL FINDING ON MRI OF BRAIN: ICD-10-CM

## 2025-06-17 LAB
ALBUMIN SERPL BCP-MCNC: 4.4 G/DL (ref 3.2–4.9)
ALBUMIN/GLOB SERPL: 1.7 G/DL
ALP SERPL-CCNC: 75 U/L (ref 30–99)
ALT SERPL-CCNC: 17 U/L (ref 2–50)
ANION GAP SERPL CALC-SCNC: 8 MMOL/L (ref 7–16)
AST SERPL-CCNC: 23 U/L (ref 12–45)
B-HCG SERPL-ACNC: <1 MIU/ML (ref 0–5)
BASOPHILS # BLD AUTO: 0.6 % (ref 0–1.8)
BASOPHILS # BLD: 0.05 K/UL (ref 0–0.12)
BILIRUB SERPL-MCNC: 0.4 MG/DL (ref 0.1–1.5)
BUN SERPL-MCNC: 8 MG/DL (ref 8–22)
CALCIUM ALBUM COR SERPL-MCNC: 9.2 MG/DL (ref 8.5–10.5)
CALCIUM SERPL-MCNC: 9.5 MG/DL (ref 8.5–10.5)
CHLORIDE SERPL-SCNC: 107 MMOL/L (ref 96–112)
CO2 SERPL-SCNC: 24 MMOL/L (ref 20–33)
CREAT SERPL-MCNC: 0.75 MG/DL (ref 0.5–1.4)
EOSINOPHIL # BLD AUTO: 0.07 K/UL (ref 0–0.51)
EOSINOPHIL NFR BLD: 0.8 % (ref 0–6.9)
ERYTHROCYTE [DISTWIDTH] IN BLOOD BY AUTOMATED COUNT: 41.4 FL (ref 35.9–50)
GFR SERPLBLD CREATININE-BSD FMLA CKD-EPI: 109 ML/MIN/1.73 M 2
GLOBULIN SER CALC-MCNC: 2.6 G/DL (ref 1.9–3.5)
GLUCOSE SERPL-MCNC: 105 MG/DL (ref 65–99)
HCT VFR BLD AUTO: 42.5 % (ref 37–47)
HGB BLD-MCNC: 14.2 G/DL (ref 12–16)
IMM GRANULOCYTES # BLD AUTO: 0.03 K/UL (ref 0–0.11)
IMM GRANULOCYTES NFR BLD AUTO: 0.4 % (ref 0–0.9)
LYMPHOCYTES # BLD AUTO: 3.02 K/UL (ref 1–4.8)
LYMPHOCYTES NFR BLD: 36.4 % (ref 22–41)
MCH RBC QN AUTO: 29.1 PG (ref 27–33)
MCHC RBC AUTO-ENTMCNC: 33.4 G/DL (ref 32.2–35.5)
MCV RBC AUTO: 87.1 FL (ref 81.4–97.8)
MONOCYTES # BLD AUTO: 0.57 K/UL (ref 0–0.85)
MONOCYTES NFR BLD AUTO: 6.9 % (ref 0–13.4)
NEUTROPHILS # BLD AUTO: 4.55 K/UL (ref 1.82–7.42)
NEUTROPHILS NFR BLD: 54.9 % (ref 44–72)
NRBC # BLD AUTO: 0.02 K/UL
NRBC BLD-RTO: 0.2 /100 WBC (ref 0–0.2)
PLATELET # BLD AUTO: 309 K/UL (ref 164–446)
PMV BLD AUTO: 11.6 FL (ref 9–12.9)
POTASSIUM SERPL-SCNC: 3.8 MMOL/L (ref 3.6–5.5)
PROT SERPL-MCNC: 7 G/DL (ref 6–8.2)
RBC # BLD AUTO: 4.88 M/UL (ref 4.2–5.4)
SODIUM SERPL-SCNC: 139 MMOL/L (ref 135–145)
WBC # BLD AUTO: 8.3 K/UL (ref 4.8–10.8)

## 2025-06-17 PROCEDURE — 84702 CHORIONIC GONADOTROPIN TEST: CPT

## 2025-06-17 PROCEDURE — 80175 DRUG SCREEN QUAN LAMOTRIGINE: CPT

## 2025-06-17 PROCEDURE — 80053 COMPREHEN METABOLIC PANEL: CPT

## 2025-06-17 PROCEDURE — 85025 COMPLETE CBC W/AUTO DIFF WBC: CPT

## 2025-06-17 PROCEDURE — 36415 COLL VENOUS BLD VENIPUNCTURE: CPT

## 2025-06-19 ENCOUNTER — RESULTS FOLLOW-UP (OUTPATIENT)
Dept: NEUROLOGY | Facility: MEDICAL CENTER | Age: 30
End: 2025-06-19

## 2025-06-19 LAB — LAMOTRIGINE SERPL-MCNC: 0.9 UG/ML (ref 3–15)

## 2025-07-01 ENCOUNTER — HOSPITAL ENCOUNTER (OUTPATIENT)
Dept: LAB | Facility: MEDICAL CENTER | Age: 30
End: 2025-07-01
Attending: NURSE PRACTITIONER
Payer: COMMERCIAL

## 2025-07-01 LAB — B-HCG SERPL-ACNC: 32 MIU/ML (ref 0–5)

## 2025-07-01 PROCEDURE — 36415 COLL VENOUS BLD VENIPUNCTURE: CPT

## 2025-07-01 PROCEDURE — 84702 CHORIONIC GONADOTROPIN TEST: CPT

## 2025-07-03 ENCOUNTER — HOSPITAL ENCOUNTER (OUTPATIENT)
Dept: LAB | Facility: MEDICAL CENTER | Age: 30
End: 2025-07-03
Attending: NURSE PRACTITIONER
Payer: COMMERCIAL

## 2025-07-03 LAB — B-HCG SERPL-ACNC: 82.8 MIU/ML (ref 0–5)

## 2025-07-03 PROCEDURE — 84702 CHORIONIC GONADOTROPIN TEST: CPT

## 2025-07-03 PROCEDURE — 36415 COLL VENOUS BLD VENIPUNCTURE: CPT

## 2025-07-15 ENCOUNTER — OFFICE VISIT (OUTPATIENT)
Facility: MEDICAL CENTER | Age: 30
End: 2025-07-15
Attending: PSYCHIATRY & NEUROLOGY
Payer: COMMERCIAL

## 2025-07-15 VITALS
HEIGHT: 63 IN | DIASTOLIC BLOOD PRESSURE: 56 MMHG | OXYGEN SATURATION: 97 % | WEIGHT: 185 LBS | TEMPERATURE: 98.5 F | SYSTOLIC BLOOD PRESSURE: 104 MMHG | BODY MASS INDEX: 32.78 KG/M2 | HEART RATE: 79 BPM | RESPIRATION RATE: 16 BRPM

## 2025-07-15 DIAGNOSIS — M54.50 CHRONIC MIDLINE LOW BACK PAIN, UNSPECIFIED WHETHER SCIATICA PRESENT: ICD-10-CM

## 2025-07-15 DIAGNOSIS — R56.9 SEIZURES (HCC): Primary | ICD-10-CM

## 2025-07-15 DIAGNOSIS — G89.29 CHRONIC MIDLINE LOW BACK PAIN, UNSPECIFIED WHETHER SCIATICA PRESENT: ICD-10-CM

## 2025-07-15 DIAGNOSIS — R90.89 ABNORMAL FINDING ON MRI OF BRAIN: ICD-10-CM

## 2025-07-15 PROCEDURE — 3074F SYST BP LT 130 MM HG: CPT | Performed by: PSYCHIATRY & NEUROLOGY

## 2025-07-15 PROCEDURE — 99215 OFFICE O/P EST HI 40 MIN: CPT | Performed by: PSYCHIATRY & NEUROLOGY

## 2025-07-15 PROCEDURE — 99214 OFFICE O/P EST MOD 30 MIN: CPT | Performed by: PSYCHIATRY & NEUROLOGY

## 2025-07-15 PROCEDURE — 3078F DIAST BP <80 MM HG: CPT | Performed by: PSYCHIATRY & NEUROLOGY

## 2025-07-15 RX ORDER — LAMOTRIGINE 25 MG/1
50 TABLET, EXTENDED RELEASE ORAL NIGHTLY
Qty: 180 TABLET | Refills: 2 | Status: SHIPPED | OUTPATIENT
Start: 2025-07-15

## 2025-07-15 RX ORDER — LAMOTRIGINE 100 MG/1
200 TABLET, EXTENDED RELEASE ORAL
Qty: 180 TABLET | Refills: 2 | Status: SHIPPED | OUTPATIENT
Start: 2025-07-15

## 2025-07-15 ASSESSMENT — PATIENT HEALTH QUESTIONNAIRE - PHQ9: CLINICAL INTERPRETATION OF PHQ2 SCORE: 0

## 2025-07-15 ASSESSMENT — FIBROSIS 4 INDEX: FIB4 SCORE: 0.54

## 2025-07-15 NOTE — PATIENT INSTRUCTIONS
Please take lamotrigine SR24 250 mg every day.    Please stop lamotrigine immediately and  seek emergent medical attention if you experience any spreading skin rash, lesions in your mouth/private areas, and/or fever.     Please continue vitamin D supplementation.    Please let our office know if you have any changes in your seizure frequency and/characteristics. Otherwise, please keep the diary of your events and bring it with you at the time of your next follow up visit with our office.     Please take folic acid 1 mg daily. This is an over-the-counter supplement that is recommended to prevent certain developmental problems in your baby, in case you become pregnant in the future.    Please let our office know as soon as you become pregnant or plan to become pregnant.    If you are caring for a baby/young child, please make sure to be sitting on a soft surface while holding your baby/young child, so in case you have a seizure, your baby/young child is not injured due to fall.     Please let us know if you observe that your baby is excessively sleepy/has other changes and the pediatrician feels that there are no other explanations except possible adverse effects of antiseizure medication(s) your are taking while nursing your baby.     Please note that the following might precipitate seizures: missed doses of antiseizure medications, being sick with fever, stress, fatigue, sleep deprivation, not eating regularly, not drinking enough water, drinking too much alcohol, stopping alcohol suddenly if you are currently using it on a regular/daily basis, and/or using recreational drugs, among others.    Please note that the following might lead to an injury, potentially a life-threatening injury, in case you have a seizure and/or lose awareness while:   - being in a large body of water by yourself, such as bath, pool, lake, ocean, among others (risk of drowning)   - being on unprotected heights (risk of fall)   - being around  and/or operating heavy machinery (risk of injury)   - being around open fire/hot surfaces (risk of burns)   - any other activities/circumstances, in which if you lose awareness, you might injure yourself and/or others.    Please call for help (crisis line and/or 911) in case you have thoughts of harming yourself and/or others.    Please stop driving if you experience     ------------------------------------------------------------------------------------------  Instructions for your family/caregivers:  Please call 911 if the patient has a seizure longer than 2-3 minutes, if seizures are back to back without him recovering to his baseline, or he does not start recovering within 5-10 minutes after the seizure stops. During the seizure - please turn him on his side, please make sure his head is protected (for example, you should put a pillow under his head, if one is available), and please do not put anything in his mouth.   -------------------------------------------------------------------------------------------    It is important that your seizures are well controlled and you have none or have them rarely. In addition to avoiding injury related to breakthrough seizures, frequent seizures increase risk of SUDEP (sudden unexpected death in epilepsy), where a person goes into a seizure and then never wakes up - this is a rare complication of seizure disorder; one of the best available ways to prevent it is to control your seizures well.     Due to the high volume of patients we are trying to help, your physician will not be able to respond by phone or in MyChart to your routine concerns between appointments.  This does not reflect a lack of interest or concern for you or your diagnosis.  Please bring these questions and concerns to your appointment where your physician can answer.  Please relay more pressing concerns to our office, either via [employer], or by phone; if not able to reach us please visit nearby Urgent  Care Center or Emergency Department.  If any emergent medical needs, please seek emergent medical help and/or call 911.    Please note that we are not able to fill out paperwork that might be related to your work, utility company, disability, and/or driving, among others, in between the visits.  Please schedule a dedicated appointment to address your paperwork, so we can do that in a timely manner.  This is not due to lack of concern or interest for your disease-related work/administrative problems, but to make sure that we provide the best possible care and to fill out your paperwork in a correct and timely manner.    Thank you for entrusting your neurological care to [facility] and we look forward to continuing to serve you.       Due to the high volume of patients we are trying to help, your physician will not be able to respond by phone or in [employer] to your routine concerns between appointments.  This does not reflect a lack of interest or concern for you or your diagnosis.  Please bring these questions and concerns to your appointment where your physician can answer.  Please relay more pressing concerns to our office, either via [employer], or by phone; if not able to reach us please visit nearby Urgent Care Center or Emergency Department.  If any emergent medical needs, please seek emergent medical help and/or call 911.    Please note that we are not able to fill out paperwork that might be related to your work, utility company, disability, and/or driving, among others, in between the visits.  Please schedule a dedicated appointment to address your paperwork, so we can do that in a timely manner.  This is not due to lack of concern or interest for your disease-related work/administrative problems, but to make sure that we provide the best possible care and to fill out your paperwork in a correct and timely manner.    Thank you for entrusting your neurological care to [facility] and we look forward to  continuing to serve you.

## 2025-07-15 NOTE — PROGRESS NOTES
[facility] Epilepsy Program  Follow Up Visit      Patient's Name: Theodora Ponce  YOB: 1995  MRN: [id number]  Date of Service: 07/15/2025.    Referring Provider: Onofre Torres M.D.  [address] [place] [address]  [place] [id number]    Chief Concern: Seizures and right frontal lobe T2 hyperintensities noted on MRI brain.    The patient presents for a follow up visit. She presents with her partner and provides verbal consent for her to be present and provide additional information as needed.     HPI (as obtained at the time of the initial visit and updated as necessary): The patient is a 29 y.o., right-handed female, who initially presented to my clinic for evaluation of seizures on 04/03/2023.    The patient was in her usual health until 2018, when she had appendicitis, followed by cholecystitis, followed by pleuritis, and then finally pericarditis; she was evaluated for an autoimmune disorder, but was never diagnosed with any particular autoimmune disorder. She was treated with steroids at that time and eventually got better. She eventually was evaluated at [facility] in [place], but that was at the time when her inflammation subsided.     During this period, she had her first bilateral tonic-clonic seizure, as noted under event type #1, and afterwards started having spells described under the event type #2.She was initially tried on Keppra, but due to mood issues switched to lamotrigine, and eventually to lamotrigine extended release. She had no adverse effects to lamotrigine - no skin rash, no insomnia, no dizziness/balance issues.     Due to some non-specific white matter changes noted on MRI brain, she was evaluated for MS, but she never had symptoms consistent with MS. She never had vision changes, Lhermitte's sign, bladder/bowel incontinence, difficulties walking, nor persistent excessive fatigue. She has been getting yearly MRI studies.     She recently completed PT for her lower  "back pain and sciatica and is doing fine at this time; no weakness, numbness, back pain, bladder, nor bowel incontinence.     She does have intermittent work finding difficulties since 2018.     Early in 2024, she was experiencing brief episodes of pins-and-needles mostly over her left arm, lasting 30-60 seconds, but these have resolved once lamotrigine SR was increased from 100 mg daily to 150 mg daily.     Semiology:    Event type #1: \"Seizures\".  Year/Age of Onset: 2018 (age 23)  Initial features: She feels fatigued before going to bed. All her seizures occurred out of sleep.   Event features: Bilateral tonic-clonic seizures, with no clear lateralization. Tongue bite, but no bladder/bowel incontinence. She does not have a recollection for the time during the event and she is not responsive during the event.   Post-ictal features: Angry and has to urinate right after a seizure. Confused and exhausted.   Duration: On average 1 minute, but one was 3.5 minutes.   Frequency: The first event was in August 2018. The second was 2 hours later. The third event occurred in December 2019.   Precipitating factors: Being off antiseizure medication (the last event was precipitated by being off Keppra).     Event type #2: \"Spells of metallic taste\".  Year/Age of Onset: 2018 (age 23)  Initial features: Metallic tasted in her mouth (like blood).  Event features: She experiences some nausea, heart racing, sweating, feels lighted, feels like something is coming over her, feels numb, has to sit down and \"breathe through it\". No confusion, no psychic phenomena, and no loss of time.   Post-ictal features: Feels a bit exhausted for a couple hours.   Duration: Seconds to minutes for most of her symptoms, but metallic taste and nausea up to couple hours.   Frequency: She used to have twice a month; she had none since she was switched to lamotrigine extended release around 2020.   Precipitating factors: Not clear.     History of status " "epilepticus: no  History of physical injury related to seizures: no  History of surgery related to epilepsy: no  Family Planning: No plans for pregnancy.  Current Driving Status: She does not have a 's license and does not drive.   Seizure Clusters: No  Longest Seizure Freedom: No seizures since December 2019. No events of tingling with no loss of consciousness since March 2024.    Pertinent Ancillary Test Results:    MRI brain studies:   - MRI brain wo/w contrast (02/17/2021 at [facility]): \"IMPRESSION: 1. A couple nonspecific tiny T2 hyperintense foci in the right frontal periventricular white matter which are indeterminate and could represent nonspecific gliosis although cannot exclude tiny chronic demyelinating lesions. No abnormal enhancement.   2. No evidence of mass lesion, heterotopic gray matter, gross cortical dysplasia or mesial temporal sclerosis.     - MRI brain wo/w contrast (02/23/2022 at [facility]): \"No acute process. Stable appearance of tiny T2 hyperintense lesions in the right frontal region as described above, which may represent chronic demyelinating lesions. No new lesions are seen. No abnormal enhancement is identified.\"     - MRI brain w/wo contrast (01/15/2024 at [facility]): \"IMPRESSION: 1.  Stable few nonspecific supratentorial T2 hyperintensities. There are no new white matter T2 hyperintensities. 2.  History of seizures: There is no evidence of cortical dysplasia, or neuronal migrational abnormality or hippocampal sclerosis.\" I reviewed key images with the patient on 03/04/2024.     - MRI c-spine w/wo contrast (04/06/2021 [place] [facility] \"IMPRESSION:   1.  No abnormal cervical cord signal or enhancement.  2.  Minimal spondylosis. No spinal or foraminal stenosis.     MRI t-spine w/wo contrast (04/06/2021 [facility] \"IMPRESSION:   1.  No abnormal thoracic cord signal or enhancement.  2.  T6-T7 small posterior disc extrusion.  3.  No significant thoracic " "spinal or foraminal stenosis.     - MRI brain wo contrast (03/30/2025): \"IMPRESSION: 1.  MRI OF THE BRAIN WITHOUT CONTRAST WITHIN NORMAL LIMITS. NO SIGNIFICANT WHITE MATTER DISEASE ON TODAY'S EXAM. NO EVIDENCE OF DEMYELINATING DISEASE/MULTIPLE SCLEROSIS.\" I reviewed the images on 7/15/2025 and noted some very discrete T2 hyperintensities on the right, but otherwise normal.     EEG studies:   - Standard EEG (02/19/2021 Dr. Haque): This is a normal routine EEG recording in the awake, drowsy/sleep state(s).  Clinical correlation is recommended. Note: A normal EEG does not rule out epilepsy.  If the clinical suspicion remains high for seizures, a prolonged recording to capture clinical or subclinical events may be helpful.    INTERIM HISTORY (07/15/2025): The patient again had no events suspicious for seizures. Specifically, no staring spells, convulsions, nor episodes of waking up in the morning sore/wetted/with blood in her mouth. She is taking lamotrigine  mg nightly and tolerates it well.    She is pregnant, at 6 weeks at this time.     She has no symptoms suspicious for MS.     No further intermittent episodes of feeling pins and needles in her chest and both arms, and it seems these were related to wearing of phenomena of injections she is receiving for her past lung challenges from 2018 (also improved with higher dose of lamotrigine). These sensations were never precipitated with her head bending forward.  The last 1 of these episodes was in March 2024.    Her back pain is stable and no bladder/bowel incontinence reported.     Current Antiseizure Medications: Lamotrigine  mg daily.     Previously Tried Antiseizure Medications: Lamotrigine 100 mg daily. Keppra (had severe mood issues, suicidal ideation, and somnolence).     Review of Systems: No recent fevers. She lost 4 lbs in the interim (nausea associated with pregnancy). No SI/HI.     Risk Factors For Epilepsy/Seizure Disorder: The patient is a " product of premature pregnancy,  and the delivery was complicated with hypoxia due to cord wrapped around her neck; she was a blue baby. The early development was normal and the patient started waking, talking, and engaging in social interaction as expected. There were no challenges during school and no reported attendance of special education programs. There is no history of head trauma. There is no history of stroke. There is no history of CNS infections, such as encephalitis and/or meningitis. There is no personal history of febrile seizures. There is no history of neurosurgical interventions. There is no reported history of staring spells during childhood/school years.    Past Medical History:  Past Medical History:   Diagnosis Date    Pericarditis 05/2018    Seizure disorder (HCC)     Tonsillitis      Past Surgical History:  Past Surgical History:   Procedure Laterality Date    CHOLECYSTECTOMY  05/2018    APPENDECTOMY  04/2018    TONSILLECTOMY  2/24/2010    Performed by YULIYA PEDERSEN at SURGERY [facility] ORS      Social History:  Social History     Tobacco Use    Smoking status: Never    Smokeless tobacco: Never   Vaping Use    Vaping status: Never Used   Substance Use Topics    Alcohol use: Yes     Alcohol/week: 1.2 - 1.8 oz     Types: 2 - 3 Standard drinks or equivalent per week     Comment: occ    Drug use: No     Family History:  There is known family history of febrile seizures - her sister and her sister's son had febrile seizures.        7/15/2025     4:00 PM 3/12/2025     4:00 PM 8/21/2024     3:30 PM 3/4/2024    11:30 AM 4/3/2023     8:40 AM   PHQ-9 Screening   Little interest or pleasure in doing things 0 - not at all 0 - not at all 0 - not at all 0 - not at all 0 - not at all   Feeling down, depressed, or hopeless 0 - not at all 0 - not at all 0 - not at all 0 - not at all 0 - not at all   PHQ-2 Total Score 0 0 0 0 0     [facility] Suicide Reassessment  New or continued thoughts about  "killing self?: No  Preparing to end life?: No    Allergies:  Allergies   Allergen Reactions    Vicodin [Hydrocodone-Acetaminophen] Hives    Hydrocodone-Acetaminophen Hives     Current Medications:    Current Outpatient Medications:     LamoTRIgine, 100 mg, Oral, QHS (Patient taking differently: 100 mg, Oral, EVERY BEDTIME, TWO TABLETS ), Taking Differently    LamoTRIgine, 50 mg, Oral, Nightly, Taking    valACYclovir, TAKE 4 TABLETS AT FIRST SIGN OF ATTACK AND THEN TAKE 4 TABLETS 12 HOURS LATER, Taking    methocarbamol, Take  by mouth 4 times a day as needed. (Patient not taking: Reported on 7/15/2025), Not Taking    Physical Examination:    Ambulatory Vitals  Encounter Vitals  Temperature: 36.9 °C (98.5 °F)  Temp src: Temporal  Blood Pressure: 104/56  Pulse: 79  Respiration: 16  Pulse Oximetry: 97 %  Weight: 83.9 kg (185 lb)  Height: 160 cm (5' 3\")  BMI (Calculated): 32.77    Neurological Examination:  Mental Status: The patient is alert and oriented to person, place, time, and situation. Speech is fluent, with no aphasia nor dysarthria noted. Affect is normal.    Cranial Nerve Examination:  CN I: Olfaction examination is deferred.  CN II: Visual fields are full to confrontation examination and show no visual field defect.   CN III, IV, VI: Eye movements are normal in all directions. Pupils are reactive to direct and consensual light. There is no relative afferent pupillary defect. There is no nystagmus.  CN V: Facial sensation to light touch is intact throughout.   CN VII: No significant facial muscle or other soft tissue asymmetry.  CN VIII: Hearing intact to rubbing sounds bilaterally.   CN IX, X: Soft palate elevates symmetrically.  CN XI: Symmetrical shoulder shrug exam.  CN XII: Midline tongue protrusion and moves symmetrically to each side.     Motor Examination:  Muscle strength is intact throughout. Muscle tone is normal throughout. No abnormal movements are observed. No pronator drift is noted.     Muscle " Stretch Reflexes Examination:  Deferred.     Sensory Examination:  Preserved sensation to light touch in all extremities.     Coordination:  Normal finger to nose testing bilaterally, no postural nor intentional tremor was noted.     Stance/gait:  Normal regular gait with normal arm swings and stride length. Able to perform tandem gait. Romberg sign is absent.     Labs:   - lamotrigine (12/19/2023): 1.9   - lamotrigine (06/28/2024): 1.9     - CBC (06/17/2025): Normal.   - CMP (06/17/2025): Normal, except minimally elevated serum glucose at 105   - lamotrigine (06/17/2025): 0.9    ASSESSMENT AND PLAN:    1. 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 The etiology is most likely related to the reported hypoxia at birth in context of umbilical cord being around her neck; she also has family history of febrile seizures. The systemic inflammatory event in 2018 was likely a precipitating factor.  She is doing well on lamotrigine at this time and has no adverse effects from it.    I have reviewed the most recent blood work today: CBC was normal, CMP was minimally abnormal due to elevated serum glucose at 105, and lamotrigine level was low at 0.9.  This low lamotrigine level was likely in context of pregnancy, and we increased her lamotrigine SR in the interim from 150 mg daily to 200 mg daily.    In context of the above low level of lamotrigine, and current pregnancy, we will increase lamotrigine SR from 200 mg daily to 250 mg daily.  Adverse effects discussed in detail.  - LamoTRIgine 100 MG TABLET SR 24 HR; Take 200 mg by mouth at bedtime.  Dispense: 180 Tablet; Refill: 2  - LamoTRIgine 25 MG TABLET SR 24 HR; Take 2 tablets (50 mg) by mouth every evening.  Dispense: 180 Tablet; Refill: 2    The patient will obtain a lamotrigine level today/tomorrow, just prior to her next dose of lamotrigine, and then she will get another  lamotrigine level in about 4 weeks.  The patient verbalized understanding and agreement.  Plan for labs every 4 weeks.  In context of further increase of lamotrigine due to concern for potential breakthrough seizures, in context of pregnancy and low lamotrigine serum level, we need to intensively monitor for toxicity of lamotrigine, both clinically and via labs.  - LAMOTRIGINE; Future  - LAMOTRIGINE; Future    We discussed in detail that it is important that she continues to take lamotrigine, that we continue to increase the dose to maintain stable serum level, and that lamotrigine is considered safe during pregnancy, as well as that breakthrough seizures may be dangerous for pregnancy.  The patient and her partner verbalized understanding and agreement.    No need for repeat EEG at this time.     She is driving with no problems at this time.     Epilepsy counseling provided in writing.    2. Concern for demyelinating disorder/abnormal finding on MRI brain.  In context of her non-specific, right frontal, white matter lesions, a concern was raised that she has an MS or a similar demyelinating disorder. Her MRI brain imaging remained stable and her MRI c-spine and t-spine showed no demyelinating lesions.  - we again reviewed symptoms of MS and she has none today; her exam was normal as well.  - we reviewed MRI brain without contrast which was done in June 2025, and upon my review showed only discrete right frontal subcortical T2 hyperintensities.  No mass lesions, no periventricular/juxtacortical white matter lesions.  I personally reviewed MRI brain images today and noted my findings in this paragraph.   - plan to repeat MRI brain in 2027    3. Lower back pain and sciatica (history of).  Her symptoms got better after chiropractic treatment in the past. .  - no symptoms nor signs of myelopathy at this time  - we had a discussion in the past that if she experience any new neurological symptoms, such as new pain in her  neck/back, weakness/numbness, difficulties walking, and/or bladder/bowel incontinence, among others, to seek emergent medical help; the patient verbalized understanding and agreement.   - she should also continue to follow up with spine specialist as needed.    The patient was advised to follow-up with her primary care physician for her other medical problems.     Patient Instructions   Please take lamotrigine SR24 250 mg every day.    Please stop lamotrigine immediately and  seek emergent medical attention if you experience any spreading skin rash, lesions in your mouth/private areas, and/or fever.     Please continue vitamin D supplementation.    Please let our office know if you have any changes in your seizure frequency and/characteristics. Otherwise, please keep the diary of your events and bring it with you at the time of your next follow up visit with our office.     Please take folic acid 1 mg daily. This is an over-the-counter supplement that is recommended to prevent certain developmental problems in your baby, in case you become pregnant in the future.    Please let our office know as soon as you become pregnant or plan to become pregnant.    If you are caring for a baby/young child, please make sure to be sitting on a soft surface while holding your baby/young child, so in case you have a seizure, your baby/young child is not injured due to fall.     Please let us know if you observe that your baby is excessively sleepy/has other changes and the pediatrician feels that there are no other explanations except possible adverse effects of antiseizure medication(s) your are taking while nursing your baby.     Please note that the following might precipitate seizures: missed doses of antiseizure medications, being sick with fever, stress, fatigue, sleep deprivation, not eating regularly, not drinking enough water, drinking too much alcohol, stopping alcohol suddenly if you are currently using it on a  regular/daily basis, and/or using recreational drugs, among others.    Please note that the following might lead to an injury, potentially a life-threatening injury, in case you have a seizure and/or lose awareness while:   - being in a large body of water by yourself, such as bath, pool, lake, ocean, among others (risk of drowning)   - being on unprotected heights (risk of fall)   - being around and/or operating heavy machinery (risk of injury)   - being around open fire/hot surfaces (risk of burns)   - any other activities/circumstances, in which if you lose awareness, you might injure yourself and/or others.    Please call for help (crisis line and/or 911) in case you have thoughts of harming yourself and/or others.    Please stop driving if you experience     ------------------------------------------------------------------------------------------  Instructions for your family/caregivers:  Please call 911 if the patient has a seizure longer than 2-3 minutes, if seizures are back to back without him recovering to his baseline, or he does not start recovering within 5-10 minutes after the seizure stops. During the seizure - please turn him on his side, please make sure his head is protected (for example, you should put a pillow under his head, if one is available), and please do not put anything in his mouth.   -------------------------------------------------------------------------------------------    It is important that your seizures are well controlled and you have none or have them rarely. In addition to avoiding injury related to breakthrough seizures, frequent seizures increase risk of SUDEP (sudden unexpected death in epilepsy), where a person goes into a seizure and then never wakes up - this is a rare complication of seizure disorder; one of the best available ways to prevent it is to control your seizures well.     Due to the high volume of patients we are trying to help, your physician will not be  able to respond by phone or in MyChart to your routine concerns between appointments.  This does not reflect a lack of interest or concern for you or your diagnosis.  Please bring these questions and concerns to your appointment where your physician can answer.  Please relay more pressing concerns to our office, either via MyChart, or by phone; if not able to reach us please visit nearby Urgent Care Center or Emergency Department.  If any emergent medical needs, please seek emergent medical help and/or call 911.    Please note that we are not able to fill out paperwork that might be related to your work, utility company, disability, and/or driving, among others, in between the visits.  Please schedule a dedicated appointment to address your paperwork, so we can do that in a timely manner.  This is not due to lack of concern or interest for your disease-related work/administrative problems, but to make sure that we provide the best possible care and to fill out your paperwork in a correct and timely manner.    Thank you for entrusting your neurological care to [facility] and we look forward to continuing to serve you.       Due to the high volume of patients we are trying to help, your physician will not be able to respond by phone or in MyChart to your routine concerns between appointments.  This does not reflect a lack of interest or concern for you or your diagnosis.  Please bring these questions and concerns to your appointment where your physician can answer.  Please relay more pressing concerns to our office, either via [employer], or by phone; if not able to reach us please visit nearby Urgent Care Center or Emergency Department.  If any emergent medical needs, please seek emergent medical help and/or call 911.    Please note that we are not able to fill out paperwork that might be related to your work, utility company, disability, and/or driving, among others, in between the visits.  Please schedule a  dedicated appointment to address your paperwork, so we can do that in a timely manner.  This is not due to lack of concern or interest for your disease-related work/administrative problems, but to make sure that we provide the best possible care and to fill out your paperwork in a correct and timely manner.    Thank you for entrusting your neurological care to [facility] and we look forward to continuing to serve you.    Follow up in 1.5 months.     Marvin De Jesus MD  Neurology Attending, Epilepsy Program  [facility]

## 2025-07-16 ENCOUNTER — HOSPITAL ENCOUNTER (OUTPATIENT)
Dept: LAB | Facility: MEDICAL CENTER | Age: 30
End: 2025-07-16
Attending: PSYCHIATRY & NEUROLOGY
Payer: COMMERCIAL

## 2025-07-16 ENCOUNTER — APPOINTMENT (OUTPATIENT)
Dept: NEUROLOGY | Facility: MEDICAL CENTER | Age: 30
End: 2025-07-16
Attending: PSYCHIATRY & NEUROLOGY
Payer: COMMERCIAL

## 2025-07-16 DIAGNOSIS — R56.9 SEIZURES (HCC): ICD-10-CM

## 2025-07-16 PROCEDURE — 80175 DRUG SCREEN QUAN LAMOTRIGINE: CPT

## 2025-07-16 PROCEDURE — 36415 COLL VENOUS BLD VENIPUNCTURE: CPT

## 2025-07-18 LAB — LAMOTRIGINE SERPL-MCNC: 2.6 UG/ML (ref 3–15)

## 2025-08-14 ENCOUNTER — TELEPHONE (OUTPATIENT)
Dept: NEUROLOGY | Facility: MEDICAL CENTER | Age: 30
End: 2025-08-14
Payer: COMMERCIAL

## 2025-08-14 ENCOUNTER — HOSPITAL ENCOUNTER (OUTPATIENT)
Dept: LAB | Facility: MEDICAL CENTER | Age: 30
End: 2025-08-14
Attending: PSYCHIATRY & NEUROLOGY
Payer: COMMERCIAL

## 2025-08-14 DIAGNOSIS — R56.9 SEIZURES (HCC): ICD-10-CM

## 2025-08-14 PROCEDURE — 36415 COLL VENOUS BLD VENIPUNCTURE: CPT

## 2025-08-14 PROCEDURE — 80175 DRUG SCREEN QUAN LAMOTRIGINE: CPT

## 2025-08-16 LAB — LAMOTRIGINE SERPL-MCNC: <0.9 UG/ML (ref 3–15)

## 2025-08-18 ENCOUNTER — OFFICE VISIT (OUTPATIENT)
Facility: MEDICAL CENTER | Age: 30
End: 2025-08-18
Attending: PSYCHIATRY & NEUROLOGY
Payer: COMMERCIAL

## 2025-08-18 ENCOUNTER — INITIAL PRENATAL (OUTPATIENT)
Dept: OBGYN | Facility: CLINIC | Age: 30
End: 2025-08-18
Payer: COMMERCIAL

## 2025-08-18 ENCOUNTER — HOSPITAL ENCOUNTER (OUTPATIENT)
Facility: MEDICAL CENTER | Age: 30
End: 2025-08-18
Attending: OBSTETRICS & GYNECOLOGY
Payer: COMMERCIAL

## 2025-08-18 VITALS
SYSTOLIC BLOOD PRESSURE: 113 MMHG | WEIGHT: 192 LBS | DIASTOLIC BLOOD PRESSURE: 64 MMHG | HEART RATE: 78 BPM | BODY MASS INDEX: 34.01 KG/M2

## 2025-08-18 VITALS
BODY MASS INDEX: 34.2 KG/M2 | RESPIRATION RATE: 16 BRPM | WEIGHT: 193 LBS | HEART RATE: 91 BPM | OXYGEN SATURATION: 100 % | SYSTOLIC BLOOD PRESSURE: 110 MMHG | TEMPERATURE: 98.3 F | DIASTOLIC BLOOD PRESSURE: 64 MMHG | HEIGHT: 63 IN

## 2025-08-18 DIAGNOSIS — O99.351 SEIZURE DISORDER DURING PREGNANCY, FIRST TRIMESTER (HCC): ICD-10-CM

## 2025-08-18 DIAGNOSIS — R56.9 SEIZURES (HCC): ICD-10-CM

## 2025-08-18 DIAGNOSIS — E66.9 OBESITY (BMI 30-39.9): ICD-10-CM

## 2025-08-18 DIAGNOSIS — G40.909 SEIZURE DISORDER DURING PREGNANCY, FIRST TRIMESTER (HCC): ICD-10-CM

## 2025-08-18 DIAGNOSIS — Z86.79 HISTORY OF PERICARDITIS: ICD-10-CM

## 2025-08-18 DIAGNOSIS — O09.91 HIGH-RISK PREGNANCY IN FIRST TRIMESTER: Primary | ICD-10-CM

## 2025-08-18 DIAGNOSIS — R56.9 SEIZURES (HCC): Primary | ICD-10-CM

## 2025-08-18 DIAGNOSIS — O09.91 HIGH-RISK PREGNANCY IN FIRST TRIMESTER: ICD-10-CM

## 2025-08-18 PROCEDURE — 87491 CHLMYD TRACH DNA AMP PROBE: CPT

## 2025-08-18 PROCEDURE — 3078F DIAST BP <80 MM HG: CPT | Performed by: PSYCHIATRY & NEUROLOGY

## 2025-08-18 PROCEDURE — 99214 OFFICE O/P EST MOD 30 MIN: CPT | Performed by: PSYCHIATRY & NEUROLOGY

## 2025-08-18 PROCEDURE — 87591 N.GONORRHOEAE DNA AMP PROB: CPT

## 2025-08-18 PROCEDURE — 3074F SYST BP LT 130 MM HG: CPT | Performed by: PSYCHIATRY & NEUROLOGY

## 2025-08-18 PROCEDURE — 0501F PRENATAL FLOW SHEET: CPT | Performed by: OBSTETRICS & GYNECOLOGY

## 2025-08-18 RX ORDER — LAMOTRIGINE 100 MG/1
300 TABLET, EXTENDED RELEASE ORAL
Qty: 90 TABLET | Refills: 3 | Status: SHIPPED | OUTPATIENT
Start: 2025-08-18

## 2025-08-18 ASSESSMENT — EDINBURGH POSTNATAL DEPRESSION SCALE (EPDS)
I HAVE FELT SCARED OR PANICKY FOR NO GOOD REASON: NO, NOT AT ALL
I HAVE BEEN ANXIOUS OR WORRIED FOR NO GOOD REASON: NO, NOT AT ALL
I HAVE FELT SAD OR MISERABLE: NO, NOT AT ALL
I HAVE BEEN SO UNHAPPY THAT I HAVE HAD DIFFICULTY SLEEPING: NOT AT ALL
I HAVE BEEN SO UNHAPPY THAT I HAVE BEEN CRYING: NO, NEVER
TOTAL SCORE: 3
THINGS HAVE BEEN GETTING ON TOP OF ME: YES, MOST OF THE TIME I HAVEN'T BEEN ABLE TO COPE AT ALL
THE THOUGHT OF HARMING MYSELF HAS OCCURRED TO ME: NEVER
I HAVE BEEN ABLE TO LAUGH AND SEE THE FUNNY SIDE OF THINGS: AS MUCH AS I ALWAYS COULD
I HAVE LOOKED FORWARD WITH ENJOYMENT TO THINGS: AS MUCH AS I EVER DID
I HAVE BLAMED MYSELF UNNECESSARILY WHEN THINGS WENT WRONG: NO, NEVER

## 2025-08-18 ASSESSMENT — FIBROSIS 4 INDEX
FIB4 SCORE: 0.54
FIB4 SCORE: 0.54

## 2025-08-18 ASSESSMENT — PATIENT HEALTH QUESTIONNAIRE - PHQ9: CLINICAL INTERPRETATION OF PHQ2 SCORE: 0

## 2025-08-18 ASSESSMENT — LIFESTYLE VARIABLES: HOW OFTEN DO YOU HAVE A DRINK CONTAINING ALCOHOL: MONTHLY OR LESS

## 2025-08-19 ENCOUNTER — HOSPITAL ENCOUNTER (OUTPATIENT)
Dept: LAB | Facility: MEDICAL CENTER | Age: 30
End: 2025-08-19
Attending: OBSTETRICS & GYNECOLOGY
Payer: COMMERCIAL

## 2025-08-19 DIAGNOSIS — O09.91 HIGH-RISK PREGNANCY IN FIRST TRIMESTER: ICD-10-CM

## 2025-08-19 LAB
ABO GROUP BLD: NORMAL
BLD GP AB SCN SERPL QL: NORMAL
C TRACH DNA GENITAL QL NAA+PROBE: NEGATIVE
ERYTHROCYTE [DISTWIDTH] IN BLOOD BY AUTOMATED COUNT: 41.1 FL (ref 35.9–50)
EST. AVERAGE GLUCOSE BLD GHB EST-MCNC: 103 MG/DL
HBA1C MFR BLD: 5.2 % (ref 4–5.6)
HBV SURFACE AG SER QL: NORMAL
HCT VFR BLD AUTO: 41.1 % (ref 37–47)
HCV AB SER QL: NORMAL
HGB BLD-MCNC: 14.2 G/DL (ref 12–16)
HIV 1+2 AB+HIV1 P24 AG SERPL QL IA: NORMAL
MCH RBC QN AUTO: 29.8 PG (ref 27–33)
MCHC RBC AUTO-ENTMCNC: 34.5 G/DL (ref 32.2–35.5)
MCV RBC AUTO: 86.3 FL (ref 81.4–97.8)
N GONORRHOEA DNA GENITAL QL NAA+PROBE: NEGATIVE
PLATELET # BLD AUTO: 247 K/UL (ref 164–446)
PMV BLD AUTO: 12.1 FL (ref 9–12.9)
RBC # BLD AUTO: 4.76 M/UL (ref 4.2–5.4)
RH BLD: NORMAL
RUBV AB SER QL: 54.6 IU/ML
SPECIMEN SOURCE: NORMAL
T PALLIDUM AB SER QL IA: NORMAL
WBC # BLD AUTO: 9.2 K/UL (ref 4.8–10.8)

## 2025-08-19 PROCEDURE — 87389 HIV-1 AG W/HIV-1&-2 AB AG IA: CPT

## 2025-08-19 PROCEDURE — 36415 COLL VENOUS BLD VENIPUNCTURE: CPT

## 2025-08-19 PROCEDURE — 86803 HEPATITIS C AB TEST: CPT

## 2025-08-19 PROCEDURE — 86850 RBC ANTIBODY SCREEN: CPT

## 2025-08-19 PROCEDURE — 86787 VARICELLA-ZOSTER ANTIBODY: CPT

## 2025-08-19 PROCEDURE — 86900 BLOOD TYPING SEROLOGIC ABO: CPT

## 2025-08-19 PROCEDURE — 86780 TREPONEMA PALLIDUM: CPT

## 2025-08-19 PROCEDURE — 87086 URINE CULTURE/COLONY COUNT: CPT

## 2025-08-19 PROCEDURE — 85027 COMPLETE CBC AUTOMATED: CPT

## 2025-08-19 PROCEDURE — 86901 BLOOD TYPING SEROLOGIC RH(D): CPT

## 2025-08-19 PROCEDURE — 87340 HEPATITIS B SURFACE AG IA: CPT

## 2025-08-19 PROCEDURE — 83036 HEMOGLOBIN GLYCOSYLATED A1C: CPT

## 2025-08-19 PROCEDURE — 86762 RUBELLA ANTIBODY: CPT

## 2025-08-21 LAB — VZV IGG SER IA-ACNC: 5.47 S/CO

## 2025-08-22 LAB
BACTERIA UR CULT: NORMAL
SIGNIFICANT IND 70042: NORMAL
SITE SITE: NORMAL
SOURCE SOURCE: NORMAL

## 2025-08-25 ENCOUNTER — HOSPITAL ENCOUNTER (OUTPATIENT)
Dept: LAB | Facility: MEDICAL CENTER | Age: 30
End: 2025-08-25
Attending: PSYCHIATRY & NEUROLOGY
Payer: COMMERCIAL

## 2025-08-25 DIAGNOSIS — R56.9 SEIZURES (HCC): ICD-10-CM

## 2025-08-25 PROCEDURE — 80175 DRUG SCREEN QUAN LAMOTRIGINE: CPT

## 2025-08-25 PROCEDURE — 36415 COLL VENOUS BLD VENIPUNCTURE: CPT

## 2025-08-26 LAB
Lab: NORMAL
NTRA FETAL FRACTION: NORMAL
NTRA GENDER OF FETUS: NORMAL
NTRA MONOSOMY X AGE-BASED RISK TEXT: NORMAL
NTRA MONOSOMY X RESULT TEXT: NORMAL
NTRA MONOSOMY X RISK SCORE TEXT: NORMAL
NTRA TRIPLOIDY RESULT TEXT: NORMAL
NTRA TRISOMY 13 AGE-BASED RISK TEXT: NORMAL
NTRA TRISOMY 13 RESULT TEXT: NORMAL
NTRA TRISOMY 13 RISK SCORE TEXT: NORMAL
NTRA TRISOMY 18 AGE-BASED RISK TEXT: NORMAL
NTRA TRISOMY 18 RESULT TEXT: NORMAL
NTRA TRISOMY 18 RISK SCORE TEXT: NORMAL
NTRA TRISOMY 21 AGE-BASED RISK TEXT: NORMAL
NTRA TRISOMY 21 RESULT TEXT: NORMAL
NTRA TRISOMY 21 RISK SCORE TEXT: NORMAL

## 2025-08-27 LAB — LAMOTRIGINE SERPL-MCNC: 3.4 UG/ML (ref 3–15)

## 2025-09-03 ENCOUNTER — APPOINTMENT (OUTPATIENT)
Dept: MATERNAL FETAL MEDICINE | Facility: MEDICAL CENTER | Age: 30
End: 2025-09-03
Payer: COMMERCIAL